# Patient Record
Sex: FEMALE | Race: WHITE | NOT HISPANIC OR LATINO
[De-identification: names, ages, dates, MRNs, and addresses within clinical notes are randomized per-mention and may not be internally consistent; named-entity substitution may affect disease eponyms.]

---

## 2017-04-26 ENCOUNTER — APPOINTMENT (OUTPATIENT)
Dept: ULTRASOUND IMAGING | Facility: CLINIC | Age: 62
End: 2017-04-26

## 2017-04-30 ENCOUNTER — EMERGENCY (EMERGENCY)
Facility: HOSPITAL | Age: 62
LOS: 1 days | Discharge: HOME | End: 2017-04-30
Attending: EMERGENCY MEDICINE | Admitting: EMERGENCY MEDICINE
Payer: COMMERCIAL

## 2017-04-30 VITALS
SYSTOLIC BLOOD PRESSURE: 119 MMHG | TEMPERATURE: 98 F | HEART RATE: 98 BPM | WEIGHT: 169.09 LBS | DIASTOLIC BLOOD PRESSURE: 75 MMHG | HEIGHT: 64 IN | RESPIRATION RATE: 18 BRPM | OXYGEN SATURATION: 95 %

## 2017-04-30 PROCEDURE — 99284 EMERGENCY DEPT VISIT MOD MDM: CPT

## 2017-04-30 PROCEDURE — 99283 EMERGENCY DEPT VISIT LOW MDM: CPT | Mod: 25

## 2017-04-30 RX ORDER — ACETAMINOPHEN WITH CODEINE 300MG-30MG
1 TABLET ORAL
Qty: 8 | Refills: 0 | OUTPATIENT
Start: 2017-04-30 | End: 2017-05-04

## 2017-04-30 RX ORDER — ACETAMINOPHEN WITH CODEINE 300MG-30MG
1 TABLET ORAL ONCE
Qty: 0 | Refills: 0 | Status: DISCONTINUED | OUTPATIENT
Start: 2017-04-30 | End: 2017-04-30

## 2017-04-30 RX ADMIN — Medication 1 TABLET(S): at 13:40

## 2017-04-30 NOTE — ED PROVIDER NOTE - PLAN OF CARE
You were seen in the ER for gluteal and leg pain. You must follow up with your primary physician in 24 to 48 hours. Return to the ER for any new or worsening signs/symptoms. You must follow up with spine clinic in 24 to 48 hours. The number for the [facility] is [phone].   1) Take tylenol/codeine as prescribed.

## 2017-04-30 NOTE — ED PROVIDER NOTE - MEDICAL DECISION MAKING DETAILS
Patient with gluteal pain radiating to L leg, most likely sciatica, doubt vascular insufficiency given H/P. Will do pain managmenet, reassess pain, likely dispo to home with spine clinic follow up. Patient with gluteal pain radiating to L leg, most likely sciatica, doubt vascular insufficiency given H/P. Will do pain managmenet, reassess pain, likely dispo to home with spine clinic follow up.       Attending note-acute on chronic lower back pain with sciatica. Soma, Lodine and Tylenol with codeine.

## 2017-04-30 NOTE — ED PROVIDER NOTE - PHYSICAL EXAMINATION
GENERAL: Awake, alert in NAD // HEENT: MMM // HEAD: Symmetric and atraumatic // EYES: EOMI // CARDIO: Skin warm and well perfused // PULM: normal respiratory effort // NEURO: Motor, sensory grossly intact // SKIN: Warm, dry, good turgur // PSYCH: Mood and affect appropriate. // MSK: T and L spine tenderness with paraspinal muscle tenderness in b/l T and L spine. R and L gluteal pain. Able to ambulate with assistance. Motor and sensory intact in distal L and R foot, PT brisk b/l as well. TTP of L thigh and shin and foot with good ROM b/l. GENERAL: Awake, alert in NAD // HEENT: MMM // HEAD: Symmetric and atraumatic // EYES: EOMI // CARDIO: Skin warm and well perfused // PULM: normal respiratory effort // NEURO: Motor, sensory grossly intact // SKIN: Warm, dry, good turgur // PSYCH: Mood and affect appropriate. // MSK: T and L spine tenderness with paraspinal muscle tenderness in b/l T and L spine. R and L gluteal pain. Able to ambulate with assistance. Motor and sensory intact in distal L and R foot, PT brisk b/l as well. TTP of L thigh and shin and foot with good ROM b/l.       Attending note. The patient is alert and in moderate to severe pain. Patient has tenderness in the left paralumbar pain sciatic notch. Sensation is normal. There is no leg edema. There is no clonus. Patient is able to ambulate. There is no CVA tenderness. There is no midline tenderness.

## 2017-04-30 NOTE — ED PROVIDER NOTE - NS ED ROS FT
No fever or chills, "a little blurry vision" x1 week, no trouble swallowing or speaking, no chest pain, no cough or SOB, no abdominal pain, no nausea or no vomiting,  no rashes, no headache,  otherwise as HPI or negative

## 2017-04-30 NOTE — ED PROVIDER NOTE - OBJECTIVE STATEMENT
61 F with h/o L knee meniscal tear and ACL tear 1 year ago, fibromyalgia, spinal stenosis, here for L buttock pain that radiates to anterior thigh and down to foot. Onset 1 week ago, worse than baseline back pain, getting worse, unable to sleep last night or walk or move, constant, severe. Takes Carisoprodol 175 mg q8 hours as needed and Etodolac 400 mg BID that did not help and Mobic15 mg but not better. Not better with Alleve, advil or excedrin either. No F/C, no chronic steroids, no nuumbness in LE, no back surgeries, no IVDU, no weakness in LE, no obvious inciting trauma or movement. 61 F with h/o L knee meniscal tear and ACL tear 1 year ago, fibromyalgia, spinal stenosis, here for L buttock pain that radiates to anterior thigh and down to foot. Onset 1 week ago, worse than baseline back pain, getting worse, unable to sleep last night or walk or move, constant, severe. Takes Carisoprodol 175 mg q8 hours as needed and Etodolac 400 mg BID that did not help and Mobic15 mg but not better. Not better with Alleve, advil or excedrin either. No F/C, no chronic steroids, no nuumbness in LE, no back surgeries, no IVDU, no weakness in LE, no obvious inciting trauma or movement.       Attending note. Patient was seen in fast track room #5. Agree with above. Patient complaining of acute on chronic left low back pain which radiates down to the lateral thigh and left knee. Patient denies any bowel or bladder dysfunction. Any saddle anesthesia or fever. Patient took Percocet without relief and began itch.  Patient has a prescription for soma and Lodine.  Patient states her spine physician referred her to pain management.

## 2017-04-30 NOTE — ED ADULT NURSE NOTE - OBJECTIVE STATEMENT
pt c/o buttock pain that radiates down leg. pain worse pt unable to sleep. pt denies numbness or bowel or bladder incontinence.

## 2017-04-30 NOTE — ED PROVIDER NOTE - CARE PLAN
Principal Discharge DX:	Sciatica of left side  Instructions for follow-up, activity and diet:	You were seen in the ER for gluteal and leg pain. You must follow up with your primary physician in 24 to 48 hours. Return to the ER for any new or worsening signs/symptoms. You must follow up with spine clinic in 24 to 48 hours. The number for the [facility] is [phone].   1) Take tylenol/codeine as prescribed. Principal Discharge DX:	Sciatica of left side  Instructions for follow-up, activity and diet:	You were seen in the ER for gluteal and leg pain. You must follow up with your primary physician in 24 to 48 hours. Return to the ER for any new or worsening signs/symptoms. You must follow up with spine clinic in 24 to 48 hours. The number for the [facility] is [phone].   1) Take tylenol/codeine as prescribed. Principal Discharge DX:	Sciatica of left side  Instructions for follow-up, activity and diet:	You were seen in the ER for gluteal and leg pain. You must follow up with your primary physician in 24 to 48 hours. Return to the ER for any new or worsening signs/symptoms. You must follow up with spine clinic in 24 to 48 hours. The number for the [facility] is [phone].   1) Take tylenol/codeine as prescribed. Principal Discharge DX:	Sciatica of left side  Instructions for follow-up, activity and diet:	You were seen in the ER for gluteal and leg pain. You must follow up with your primary physician in 24 to 48 hours. Return to the ER for any new or worsening signs/symptoms. You must follow up with spine clinic in 24 to 48 hours. The number for the [facility] is [phone].   1) Take tylenol/codeine as prescribed.

## 2017-05-04 DIAGNOSIS — M53.3 SACROCOCCYGEAL DISORDERS, NOT ELSEWHERE CLASSIFIED: ICD-10-CM

## 2017-05-08 ENCOUNTER — APPOINTMENT (OUTPATIENT)
Dept: ORTHOPEDIC SURGERY | Facility: CLINIC | Age: 62
End: 2017-05-08

## 2017-05-08 VITALS
WEIGHT: 169 LBS | HEIGHT: 64 IN | DIASTOLIC BLOOD PRESSURE: 80 MMHG | SYSTOLIC BLOOD PRESSURE: 129 MMHG | BODY MASS INDEX: 28.85 KG/M2 | HEART RATE: 114 BPM

## 2017-05-16 ENCOUNTER — FORM ENCOUNTER (OUTPATIENT)
Age: 62
End: 2017-05-16

## 2017-05-17 ENCOUNTER — OUTPATIENT (OUTPATIENT)
Dept: OUTPATIENT SERVICES | Facility: HOSPITAL | Age: 62
LOS: 1 days | End: 2017-05-17
Payer: COMMERCIAL

## 2017-05-17 ENCOUNTER — APPOINTMENT (OUTPATIENT)
Dept: MRI IMAGING | Facility: IMAGING CENTER | Age: 62
End: 2017-05-17

## 2017-05-17 DIAGNOSIS — Z00.00 ENCOUNTER FOR GENERAL ADULT MEDICAL EXAMINATION WITHOUT ABNORMAL FINDINGS: ICD-10-CM

## 2017-05-17 PROCEDURE — 72148 MRI LUMBAR SPINE W/O DYE: CPT

## 2017-05-19 ENCOUNTER — CHART COPY (OUTPATIENT)
Age: 62
End: 2017-05-19

## 2017-05-31 ENCOUNTER — APPOINTMENT (OUTPATIENT)
Dept: ORTHOPEDIC SURGERY | Facility: CLINIC | Age: 62
End: 2017-05-31

## 2017-05-31 DIAGNOSIS — M51.26 OTHER INTERVERTEBRAL DISC DISPLACEMENT, LUMBAR REGION: ICD-10-CM

## 2018-04-06 ENCOUNTER — APPOINTMENT (OUTPATIENT)
Dept: MRI IMAGING | Facility: CLINIC | Age: 63
End: 2018-04-06

## 2018-04-09 ENCOUNTER — APPOINTMENT (OUTPATIENT)
Dept: MRI IMAGING | Facility: IMAGING CENTER | Age: 63
End: 2018-04-09
Payer: COMMERCIAL

## 2018-04-09 ENCOUNTER — OUTPATIENT (OUTPATIENT)
Dept: OUTPATIENT SERVICES | Facility: HOSPITAL | Age: 63
LOS: 1 days | End: 2018-04-09
Payer: COMMERCIAL

## 2018-04-09 ENCOUNTER — INPATIENT (INPATIENT)
Facility: HOSPITAL | Age: 63
LOS: 10 days | Discharge: HOME | DRG: 515 | End: 2018-04-20
Attending: HOSPITALIST | Admitting: HOSPITALIST
Payer: COMMERCIAL

## 2018-04-09 VITALS
DIASTOLIC BLOOD PRESSURE: 77 MMHG | HEART RATE: 100 BPM | TEMPERATURE: 98 F | OXYGEN SATURATION: 100 % | RESPIRATION RATE: 18 BRPM | SYSTOLIC BLOOD PRESSURE: 140 MMHG

## 2018-04-09 DIAGNOSIS — Z00.8 ENCOUNTER FOR OTHER GENERAL EXAMINATION: ICD-10-CM

## 2018-04-09 PROCEDURE — 72148 MRI LUMBAR SPINE W/O DYE: CPT | Mod: 26

## 2018-04-09 PROCEDURE — 99285 EMERGENCY DEPT VISIT HI MDM: CPT

## 2018-04-09 PROCEDURE — 72148 MRI LUMBAR SPINE W/O DYE: CPT

## 2018-04-09 NOTE — ED PROVIDER NOTE - PHYSICAL EXAMINATION
Tung: A & O x 3, NAD, HEENT WNL and no facial asymmetry; lungs CTAB, heart with reg rhythm; abdomen soft NTND; extremities with no edema; left paraspinal pain in lumbar area, paraspinal pain in cervical area, normal rectal tone with Tech Anika, skin with no rashes, neuro exam with no sensory deficits in legs, no atrophy in legs, intact strength in legs. position of comfort with hip flexion consistent with spinal stenosis

## 2018-04-09 NOTE — ED ADULT TRIAGE NOTE - CHIEF COMPLAINT QUOTE
"I need an emergent MRI" was sent by [facility] but could not tolerate MRI due to pain

## 2018-04-09 NOTE — ED PROVIDER NOTE - PROGRESS NOTE DETAILS
Tung PGY3: patient will be discharged. She agrees that she will get MRI outpatient Tung PGY3: discharge cancelled Tung PGY3: discharge cancelled. patient can not walk, patient emotionally labile and crying on the floor. she is not thinking clearly and currently doesn't have capacity. We will admit to medicine. call psychiatry. she is on 1:1 and will get haldol. Tung PGY3: telepsych consulted Tung PGY3: discharge cancelled. patient can not walk, patient emotionally labile and crying on the floor. she is not thinking clearly and currently doesn't have capacity stating that she both needs admission for mri and pain control and also needs to leave. We will admit to medicine. call psychiatry. she is on 1:1 and will get haldol. Tung PGY3: telepsych consulted and they do not consult on inpatient medicine admits. I talked with medicine and they will consult inpatient psych. Tung PGY3: discharge cancelled. patient can not walk, patient emotionally labile and crying on the floor. she is not thinking clearly and currently doesn't have capacity stating that she both needs admission for mri and pain control and also needs to leave. We will admit to medicine. call psychiatry. she is on 1:1 and will get haldol.    Dr. Alfaro:  Patient evaluation and care begun prior to documentation. Patient was evaluated by me upon arrival at ED, and found speaking in complete sentences. Patient found with complaints of low back pain, having been told by neurologist to come to ED to undergo MRI under sedation, as patient could not complete MRI as outpatient. Physical exam revealed no gross neurologic deficits, with patient preferring position of flexion. Pain medication given at ED, with patient's symptoms adequately controlled initially. CT's ordered to evaluate lumbar spine, with results describing spinal stenosis. Patient described agreeing with discharge for outpatient follow-up with neurologist for MRI, but upon assessment of ambulation, patient found with difficulty with ambulation, and was considered unfit for discharge, requiring admission for pain control and rehabilitation. Patient agreed with admission but subsequently became agitated at ED, began yelling at ED staff, alternating descriptions of wanting to stay and wanting leave hospital. Haldol ordered for chemical restraints. Patient admitted to Medicine, with hospitalist describing Psychiatry would be consulted as inpatient. I agree with resident assessment and plan. -Dr. Benito Alfaro

## 2018-04-09 NOTE — ED PROVIDER NOTE - MEDICAL DECISION MAKING DETAILS
Tung PGY3: on arrival, patient requsting mri. in light of recent falls will trial narcotic and lay supine for CT and eval for fxr. if narcotic analgesia works, will likely discharge with a few pain medications to help her tolerate out pt mri. Currently not meeting criteria to be concerned about cord compression or cauda equina. no midline pain, no fevers, no urinary incontinence, normal rectal exam, no spinal injections. Tung PGY3: on arrival, patient requsting mri. in light of recent falls will trial narcotic and lay supine for CT and eval for fxr. if narcotic analgesia works, will likely discharge with a few pain medications to help her tolerate out pt mri. Currently not meeting criteria to be concerned about cord compression or cauda equina. no midline pain, no fevers, no urinary incontinence, normal rectal exam, no spinal injections.    Dr. Alfaro: Female patient with past hx of fibromyalgia, spinal stenosis, brought to ED due to complaints of low back pain. Patient found speaking in complete sentences. Patient found with complaints of low back pain, having been told by neurologist to come to ED to undergo MRI under sedation, as patient could not complete MRI as outpatient. No complaints of weakness, numbness, urinary retention/incontinence, constipation or bowel incontinence. Physical exam revealed no gross neurologic deficits, with patient preferring position of flexion. Pain medication given at ED, with patient's symptoms adequately controlled initially. CT's ordered to evaluate lumbar spine, with results describing spinal stenosis. Patient described agreeing with discharge for outpatient follow-up with neurologist for MRI, but upon assessment of ambulation, patient found with difficulty with ambulation, and was considered unfit for discharge, requiring admission for pain control and rehabilitation. Patient agreed with admission but subsequently became agitated at ED, began yelling at ED staff, alternating descriptions of wanting to stay and wanting leave hospital. Haldol ordered for chemical restraints. Patient admitted to Medicine, with hospitalist describing Psychiatry would be consulted as inpatient.

## 2018-04-09 NOTE — ED PROVIDER NOTE - CARE PLAN
Assessment and plan of treatment:	Follow up with your primary care doctor within 48-72 hours.   You must return for new, worsening or concerning symptoms; specifically including those listed on the attached sheet.   You may take dilaudid 2mg to help when you lie down for your MRI. You took a similar dose in the ER and tolerated the CT scan well.   Follow up for your outpatient MRI Principal Discharge DX:	Intractable back pain  Assessment and plan of treatment:	Follow up with your primary care doctor within 48-72 hours.   You must return for new, worsening or concerning symptoms; specifically including those listed on the attached sheet.   You may take dilaudid 2mg to help when you lie down for your MRI. You took a similar dose in the ER and tolerated the CT scan well.   Follow up for your outpatient MRI  Secondary Diagnosis:	Emotional lability

## 2018-04-09 NOTE — ED ADULT NURSE NOTE - OBJECTIVE STATEMENT
62 y.o F presents to the ED from a [facility] for an emergent MRI. Patient is awake, alert and speaking coherently. As per patient she was scheduled to have an MRI of the cervical and lumbar spine today but could not tolerate the procedure because of pain. Patient states "the imaging center told me to come to the hospital for an emergent MRI and sedation."

## 2018-04-09 NOTE — ED PROVIDER NOTE - PLAN OF CARE
Follow up with your primary care doctor within 48-72 hours.   You must return for new, worsening or concerning symptoms; specifically including those listed on the attached sheet.   You may take dilaudid 2mg to help when you lie down for your MRI. You took a similar dose in the ER and tolerated the CT scan well.   Follow up for your outpatient MRI

## 2018-04-09 NOTE — ED PROVIDER NOTE - OBJECTIVE STATEMENT
62 year old, presenting with 2 falls in the past few months from the ice. Requesting an MRI that has been ordered by Neurology to evaluate worsening of her cervical and lumbar pathology (disc bulge) since the falls. Went to get MRI today and she couldn't lie still from too much pain and was told to the hospital to be  "sedated" for mri. compaining of lumbar spine pain worse than cervical. patient also complaining knee pain bilaterally, ankle and shoulder all chronic. no fevers, no episodes of incontinence over the past few months (did one time not make it to the bathroom in time). Patient received paraspinal injection with Dr. Hampton in office 3 weeks ago. No history of iv drug use.     pain is refractory to Meloxicam       Neurology: Pop Jorge  PMD: pavan Oliveros

## 2018-04-10 DIAGNOSIS — M79.7 FIBROMYALGIA: ICD-10-CM

## 2018-04-10 DIAGNOSIS — M54.9 DORSALGIA, UNSPECIFIED: ICD-10-CM

## 2018-04-10 DIAGNOSIS — Z29.9 ENCOUNTER FOR PROPHYLACTIC MEASURES, UNSPECIFIED: ICD-10-CM

## 2018-04-10 DIAGNOSIS — R45.86 EMOTIONAL LABILITY: ICD-10-CM

## 2018-04-10 DIAGNOSIS — F43.24 ADJUSTMENT DISORDER WITH DISTURBANCE OF CONDUCT: ICD-10-CM

## 2018-04-10 LAB
ALBUMIN SERPL ELPH-MCNC: 4.3 G/DL — SIGNIFICANT CHANGE UP (ref 3.3–5)
ALP SERPL-CCNC: 112 U/L — SIGNIFICANT CHANGE UP (ref 40–120)
ALT FLD-CCNC: 14 U/L RC — SIGNIFICANT CHANGE UP (ref 10–45)
ANION GAP SERPL CALC-SCNC: 15 MMOL/L — SIGNIFICANT CHANGE UP (ref 5–17)
APPEARANCE UR: ABNORMAL
AST SERPL-CCNC: 11 U/L — SIGNIFICANT CHANGE UP (ref 10–40)
BASOPHILS # BLD AUTO: 0.1 K/UL — SIGNIFICANT CHANGE UP (ref 0–0.2)
BASOPHILS NFR BLD AUTO: 0.9 % — SIGNIFICANT CHANGE UP (ref 0–2)
BILIRUB SERPL-MCNC: 0.4 MG/DL — SIGNIFICANT CHANGE UP (ref 0.2–1.2)
BILIRUB UR-MCNC: ABNORMAL
BUN SERPL-MCNC: 42 MG/DL — HIGH (ref 7–23)
CALCIUM SERPL-MCNC: 10.4 MG/DL — SIGNIFICANT CHANGE UP (ref 8.4–10.5)
CHLORIDE SERPL-SCNC: 94 MMOL/L — LOW (ref 96–108)
CO2 SERPL-SCNC: 30 MMOL/L — SIGNIFICANT CHANGE UP (ref 22–31)
COLOR SPEC: SIGNIFICANT CHANGE UP
CREAT SERPL-MCNC: 0.96 MG/DL — SIGNIFICANT CHANGE UP (ref 0.5–1.3)
CRP SERPL-MCNC: 2.9 MG/DL — HIGH (ref 0–0.4)
DIFF PNL FLD: NEGATIVE — SIGNIFICANT CHANGE UP
EOSINOPHIL # BLD AUTO: 0.5 K/UL — SIGNIFICANT CHANGE UP (ref 0–0.5)
EOSINOPHIL NFR BLD AUTO: 3 % — SIGNIFICANT CHANGE UP (ref 0–6)
EPI CELLS # UR: SIGNIFICANT CHANGE UP /HPF
ERYTHROCYTE [SEDIMENTATION RATE] IN BLOOD: 85 MM/HR — HIGH (ref 0–20)
FOLATE SERPL-MCNC: >20 NG/ML — SIGNIFICANT CHANGE UP (ref 4.8–24.2)
GLUCOSE SERPL-MCNC: 175 MG/DL — HIGH (ref 70–99)
GLUCOSE UR QL: NEGATIVE — SIGNIFICANT CHANGE UP
HCT VFR BLD CALC: 34.1 % — LOW (ref 34.5–45)
HGB BLD-MCNC: 11.7 G/DL — SIGNIFICANT CHANGE UP (ref 11.5–15.5)
HYALINE CASTS # UR AUTO: ABNORMAL
KETONES UR-MCNC: NEGATIVE — SIGNIFICANT CHANGE UP
LEUKOCYTE ESTERASE UR-ACNC: NEGATIVE — SIGNIFICANT CHANGE UP
LYMPHOCYTES # BLD AUTO: 22.7 % — SIGNIFICANT CHANGE UP (ref 13–44)
LYMPHOCYTES # BLD AUTO: 3.4 K/UL — HIGH (ref 1–3.3)
MCHC RBC-ENTMCNC: 32.4 PG — SIGNIFICANT CHANGE UP (ref 27–34)
MCHC RBC-ENTMCNC: 34.3 GM/DL — SIGNIFICANT CHANGE UP (ref 32–36)
MCV RBC AUTO: 94.4 FL — SIGNIFICANT CHANGE UP (ref 80–100)
MONOCYTES # BLD AUTO: 1.1 K/UL — HIGH (ref 0–0.9)
MONOCYTES NFR BLD AUTO: 7.1 % — SIGNIFICANT CHANGE UP (ref 2–14)
NEUTROPHILS # BLD AUTO: 10 K/UL — HIGH (ref 1.8–7.4)
NEUTROPHILS NFR BLD AUTO: 66.3 % — SIGNIFICANT CHANGE UP (ref 43–77)
NITRITE UR-MCNC: NEGATIVE — SIGNIFICANT CHANGE UP
PH UR: 6 — SIGNIFICANT CHANGE UP (ref 5–8)
PLATELET # BLD AUTO: 536 K/UL — HIGH (ref 150–400)
POTASSIUM SERPL-MCNC: 3.3 MMOL/L — LOW (ref 3.5–5.3)
POTASSIUM SERPL-SCNC: 3.3 MMOL/L — LOW (ref 3.5–5.3)
PROT SERPL-MCNC: 8.5 G/DL — HIGH (ref 6–8.3)
PROT UR-MCNC: NEGATIVE — SIGNIFICANT CHANGE UP
RBC # BLD: 3.61 M/UL — LOW (ref 3.8–5.2)
RBC # FLD: 11.7 % — SIGNIFICANT CHANGE UP (ref 10.3–14.5)
RBC CASTS # UR COMP ASSIST: SIGNIFICANT CHANGE UP /HPF (ref 0–2)
SODIUM SERPL-SCNC: 139 MMOL/L — SIGNIFICANT CHANGE UP (ref 135–145)
SP GR SPEC: 1.01 — SIGNIFICANT CHANGE UP (ref 1.01–1.02)
UROBILINOGEN FLD QL: NEGATIVE — SIGNIFICANT CHANGE UP
VIT B12 SERPL-MCNC: 870 PG/ML — SIGNIFICANT CHANGE UP (ref 232–1245)
WBC # BLD: 15 K/UL — HIGH (ref 3.8–10.5)
WBC # FLD AUTO: 15 K/UL — HIGH (ref 3.8–10.5)
WBC UR QL: SIGNIFICANT CHANGE UP /HPF (ref 0–5)

## 2018-04-10 PROCEDURE — 72131 CT LUMBAR SPINE W/O DYE: CPT | Mod: 26

## 2018-04-10 PROCEDURE — 71045 X-RAY EXAM CHEST 1 VIEW: CPT | Mod: 26

## 2018-04-10 PROCEDURE — 99223 1ST HOSP IP/OBS HIGH 75: CPT | Mod: GC

## 2018-04-10 PROCEDURE — 72125 CT NECK SPINE W/O DYE: CPT | Mod: 26

## 2018-04-10 RX ORDER — OXYCODONE HYDROCHLORIDE 5 MG/1
5 TABLET ORAL EVERY 6 HOURS
Qty: 0 | Refills: 0 | Status: DISCONTINUED | OUTPATIENT
Start: 2018-04-10 | End: 2018-04-17

## 2018-04-10 RX ORDER — ACETAMINOPHEN 500 MG
650 TABLET ORAL EVERY 6 HOURS
Qty: 0 | Refills: 0 | Status: DISCONTINUED | OUTPATIENT
Start: 2018-04-10 | End: 2018-04-11

## 2018-04-10 RX ORDER — HEPARIN SODIUM 5000 [USP'U]/ML
5000 INJECTION INTRAVENOUS; SUBCUTANEOUS EVERY 8 HOURS
Qty: 0 | Refills: 0 | Status: DISCONTINUED | OUTPATIENT
Start: 2018-04-10 | End: 2018-04-15

## 2018-04-10 RX ORDER — ACETAMINOPHEN 500 MG
975 TABLET ORAL ONCE
Qty: 0 | Refills: 0 | Status: COMPLETED | OUTPATIENT
Start: 2018-04-10 | End: 2018-04-10

## 2018-04-10 RX ORDER — KETOROLAC TROMETHAMINE 30 MG/ML
15 SYRINGE (ML) INJECTION ONCE
Qty: 0 | Refills: 0 | Status: DISCONTINUED | OUTPATIENT
Start: 2018-04-10 | End: 2018-04-10

## 2018-04-10 RX ORDER — POTASSIUM CHLORIDE 20 MEQ
10 PACKET (EA) ORAL ONCE
Qty: 0 | Refills: 0 | Status: COMPLETED | OUTPATIENT
Start: 2018-04-10 | End: 2018-04-10

## 2018-04-10 RX ORDER — POTASSIUM CHLORIDE 20 MEQ
20 PACKET (EA) ORAL ONCE
Qty: 0 | Refills: 0 | Status: DISCONTINUED | OUTPATIENT
Start: 2018-04-10 | End: 2018-04-10

## 2018-04-10 RX ORDER — HYDROMORPHONE HYDROCHLORIDE 2 MG/ML
1 INJECTION INTRAMUSCULAR; INTRAVENOUS; SUBCUTANEOUS
Qty: 4 | Refills: 0 | OUTPATIENT
Start: 2018-04-10 | End: 2018-04-10

## 2018-04-10 RX ORDER — KETOROLAC TROMETHAMINE 30 MG/ML
30 SYRINGE (ML) INJECTION ONCE
Qty: 0 | Refills: 0 | Status: DISCONTINUED | OUTPATIENT
Start: 2018-04-10 | End: 2018-04-10

## 2018-04-10 RX ORDER — OXYCODONE HYDROCHLORIDE 5 MG/1
5 TABLET ORAL ONCE
Qty: 0 | Refills: 0 | Status: DISCONTINUED | OUTPATIENT
Start: 2018-04-10 | End: 2018-04-10

## 2018-04-10 RX ORDER — HYDROMORPHONE HYDROCHLORIDE 2 MG/ML
1 INJECTION INTRAMUSCULAR; INTRAVENOUS; SUBCUTANEOUS ONCE
Qty: 0 | Refills: 0 | Status: DISCONTINUED | OUTPATIENT
Start: 2018-04-10 | End: 2018-04-10

## 2018-04-10 RX ORDER — HALOPERIDOL DECANOATE 100 MG/ML
2.5 INJECTION INTRAMUSCULAR ONCE
Qty: 0 | Refills: 0 | Status: COMPLETED | OUTPATIENT
Start: 2018-04-10 | End: 2018-04-10

## 2018-04-10 RX ORDER — INFLUENZA VIRUS VACCINE 15; 15; 15; 15 UG/.5ML; UG/.5ML; UG/.5ML; UG/.5ML
0.5 SUSPENSION INTRAMUSCULAR ONCE
Qty: 0 | Refills: 0 | Status: DISCONTINUED | OUTPATIENT
Start: 2018-04-10 | End: 2018-04-20

## 2018-04-10 RX ADMIN — HYDROMORPHONE HYDROCHLORIDE 1 MILLIGRAM(S): 2 INJECTION INTRAMUSCULAR; INTRAVENOUS; SUBCUTANEOUS at 01:25

## 2018-04-10 RX ADMIN — HEPARIN SODIUM 5000 UNIT(S): 5000 INJECTION INTRAVENOUS; SUBCUTANEOUS at 15:52

## 2018-04-10 RX ADMIN — Medication 100 MILLIEQUIVALENT(S): at 16:21

## 2018-04-10 RX ADMIN — Medication 650 MILLIGRAM(S): at 13:27

## 2018-04-10 RX ADMIN — Medication 650 MILLIGRAM(S): at 14:30

## 2018-04-10 RX ADMIN — Medication 100 MILLIEQUIVALENT(S): at 14:52

## 2018-04-10 RX ADMIN — Medication 975 MILLIGRAM(S): at 00:40

## 2018-04-10 RX ADMIN — Medication 15 MILLIGRAM(S): at 04:50

## 2018-04-10 RX ADMIN — OXYCODONE HYDROCHLORIDE 5 MILLIGRAM(S): 5 TABLET ORAL at 18:07

## 2018-04-10 RX ADMIN — OXYCODONE HYDROCHLORIDE 5 MILLIGRAM(S): 5 TABLET ORAL at 00:40

## 2018-04-10 RX ADMIN — Medication 30 MILLIGRAM(S): at 13:27

## 2018-04-10 RX ADMIN — HYDROMORPHONE HYDROCHLORIDE 1 MILLIGRAM(S): 2 INJECTION INTRAMUSCULAR; INTRAVENOUS; SUBCUTANEOUS at 01:24

## 2018-04-10 RX ADMIN — HEPARIN SODIUM 5000 UNIT(S): 5000 INJECTION INTRAVENOUS; SUBCUTANEOUS at 21:23

## 2018-04-10 RX ADMIN — Medication 15 MILLIGRAM(S): at 06:00

## 2018-04-10 RX ADMIN — OXYCODONE HYDROCHLORIDE 5 MILLIGRAM(S): 5 TABLET ORAL at 04:37

## 2018-04-10 RX ADMIN — HALOPERIDOL DECANOATE 2.5 MILLIGRAM(S): 100 INJECTION INTRAMUSCULAR at 06:33

## 2018-04-10 RX ADMIN — Medication 30 MILLIGRAM(S): at 15:50

## 2018-04-10 RX ADMIN — Medication 975 MILLIGRAM(S): at 01:25

## 2018-04-10 NOTE — PHYSICAL THERAPY INITIAL EVALUATION ADULT - PERTINENT HX OF CURRENT PROBLEM, REHAB EVAL
63 y/o F PMHx significant for lumbar degenerative disc disease and fibromyalgia p/w chronic lumbago as well as progressively worsening LE weakness and now with complaints of inability to walk. 63 y/o F PMHx significant for lumbar degenerative disc disease and fibromyalgia p/w chronic lumbago as well as progressively worsening LE weakness and now with complaints of inability to walk. CT Cspine/Lspine (4/10): (-) fx, multilevel degenerative changes

## 2018-04-10 NOTE — H&P ADULT - NSHPPHYSICALEXAM_GEN_ALL_CORE
GENERAL: Laying in bed, sleeping, poorly arousable  HEENT: Head NC/AT  NECK: No deformity  CHEST/LUNG: CTABL, no wheezes  CARDIOVASCULAR: RRR, no murmur/rubs/gallops. No LE edema. 2+ pulses 4/4 extremities  ABDOMEN: Soft, +BS  EXTREMITIES: No deformities  NEURO: Unable to obtain due to patient's mentation  PSYCH: Lethargic, sleeping. GENERAL: Laying in bed, confused, occasionally raising her voice at examiners.  HEENT: Head NC/AT  NECK: No deformity  CHEST/LUNG: CTABL, no wheezes  CARDIOVASCULAR: RRR, no murmur/rubs/gallops. No LE edema. 2+ pulses 4/4 extremities  ABDOMEN: Soft, +BS  BACK: Point tenderness overlying lumbar spinous processes.   EXTREMITIES: No deformities  NEURO:  4/5 strength B/L UE. 5/5 strength B/L plantar flexion. 3/5 strength B/L hip flexors. Normal rectal tone per ED provider's exam.  PSYCH: Lethargic, occasionally irritable

## 2018-04-10 NOTE — ED BEHAVIORAL HEALTH ASSESSMENT NOTE - OTHER
Patient restlessly moving around in bed secondary to pain. Patient is tearful, irritated, and answering some questions Patient laying in bed during interview Tearful during interview Deaths in family

## 2018-04-10 NOTE — H&P ADULT - PROBLEM SELECTOR PLAN 1
- Case d/w patient's neurologist, Dr. Vidal  - Will collect ESR/CRP  - Ordered MR lumbar spine w/ w/o aggie with sedation by anesthesia  - Pain control - would start with NSAIDs and Tylenol for now

## 2018-04-10 NOTE — H&P ADULT - NSHPLABSRESULTS_GEN_ALL_CORE
Comprehensive Metabolic Panel (04.10.18 @ 00:32)    Sodium, Serum: 139 mmol/L    Potassium, Serum: 3.3 mmol/L    Chloride, Serum: 94 mmol/L    Carbon Dioxide, Serum: 30 mmol/L    Anion Gap, Serum: 15 mmol/L    Blood Urea Nitrogen, Serum: 42 mg/dL    Creatinine, Serum: 0.96 mg/dL    Glucose, Serum: 175 mg/dL    Calcium, Total Serum: 10.4 mg/dL    Protein Total, Serum: 8.5 g/dL    Albumin, Serum: 4.3 g/dL    Bilirubin Total, Serum: 0.4 mg/dL    Alkaline Phosphatase, Serum: 112 U/L    Aspartate Aminotransferase (AST/SGOT): 11 U/L    Alanine Aminotransferase (ALT/SGPT): 14 U/L RC    eGFR if Non : 64: Interpretative comment    Complete Blood Count + Automated Diff (04.10.18 @ 00:32)    WBC Count: 15.0 K/uL    RBC Count: 3.61 M/uL    Hemoglobin: 11.7 g/dL    Hematocrit: 34.1 %    Mean Cell Volume: 94.4 fl    Mean Cell Hemoglobin: 32.4 pg    Mean Cell Hemoglobin Conc: 34.3 gm/dL    Red Cell Distrib Width: 11.7 %    Platelet Count - Automated: 536 K/uL    Auto Neutrophil #: 10.0 K/uL    Auto Lymphocyte #: 3.4 K/uL    Auto Monocyte #: 1.1 K/uL    Auto Eosinophil #: 0.5 K/uL    Auto Basophil #: 0.1 K/uL    Auto Neutrophil %: 66.3: Differential percentages must be correlated with absolute numbers for  clinical significance. %    Auto Lymphocyte %: 22.7 %    Auto Monocyte %: 7.1 %    Auto Eosinophil %: 3.0 %    Auto Basophil %: 0.9 %    < from: CT Lumbar Spine No Cont (04.10.18 @ 00:55) >    IMPRESSION:      1. No lumbar spine fracture or traumatic spondylolisthesis.  2. Multilevel degenerative changes of the lumbar spine worst at L4-L5   resulting in moderate spinal canal stenosis and moderate left and mild   right neural foraminal stenosis and at L5-S1 resulting in moderate   bilateral neural foraminal stenosis.    < end of copied text >

## 2018-04-10 NOTE — H&P ADULT - HISTORY OF PRESENT ILLNESS
Patient seen and examined at the bedside. She was recently medicated with antipsychotics in the ED and was lethargic, refusing to speak with examiners. HPI below is based on ED provider's history.    "61 y/o F presenting with 2 falls in the past few months from the ice. Requesting an MRI that has been ordered by Neurology to evaluate worsening of her cervical and lumbar pathology (disc bulge) since the falls. Went to get MRI today and she couldn't lie still from too much pain and was told to the hospital to be "sedated" for MRI. complaining of lumbar spine pain worse than cervical. Patient also complaining knee pain bilaterally, ankle and shoulder all chronic. no fevers, no episodes of incontinence over the past few months (did one time not make it to the bathroom in time). Patient received paraspinal injection with Dr. Baumann in office 3 weeks ago. No history of IV drug use."     In the ED, patient was slated to be discharged but later became emotionally disturbed, violent, and was crying. She was put on 1:1 and given Haldol. ED team contacted telepsychiatry but they stated they do not see inpatient admits. ED was advised to admit to medicine team for inpatient psych consult.     CT showed:   1. No lumbar spine fracture or traumatic spondylolisthesis.  2. Multilevel degenerative changes of the lumbar spine worst at L4-L5   resulting in moderate spinal canal stenosis and moderate left and mild   right neural foraminal stenosis and at L5-S1 resulting in moderate   bilateral neural foraminal stenosis. Patient seen and examined at the bedside. She was recently medicated with antipsychotics in the ED and was lethargic, refusing to speak with examiners. HPI below is based on ED provider's history.    "61 y/o F presenting with 2 falls in the past few months from the ice. Requesting an MRI that has been ordered by Neurology to evaluate worsening of her cervical and lumbar pathology (disc bulge) since the falls. Went to get MRI today and she couldn't lie still from too much pain and was told to the hospital to be "sedated" for MRI. Complaining of lumbar spine pain worse than cervical. Patient also complaining knee pain bilaterally, ankle and shoulder all chronic. No fevers, no episodes of incontinence over the past few months (did one time not make it to the bathroom in time). Patient received paraspinal injection with Dr. Baumann in office 3 weeks ago. No history of IV drug use."     In the ED, patient was slated to be discharged but later became emotionally disturbed, violent, and was crying. She was put on 1:1 and given Haldol. ED team contacted telepsychiatry but they stated they do not see inpatient admits. ED was advised to admit to medicine team for inpatient psych consult. Patient's vitals were WNL other than for borderline tachycardia. Labs generally unremarkable other than for leukocytosis of 15 and K of 3.3. CT scan was ordered in the ED to r/o fx. No vertebral fractures were noted and was otherwise remarkable for:   1. No lumbar spine fracture or traumatic spondylolisthesis.  2. Multilevel degenerative changes of the lumbar spine worst at L4-L5   resulting in moderate spinal canal stenosis and moderate left and mild   right neural foraminal stenosis and at L5-S1 resulting in moderate   bilateral neural foraminal stenosis.      Patient was later seen by medicine team, at which point she was awake but appeared confused. She said the "chair" she was sitting on (while on the hospital stretcher) was causing her pain. She says she "cannot walk" and says "I am in pain." She also said she wanted to leave the ED.

## 2018-04-10 NOTE — H&P ADULT - PROBLEM SELECTOR PLAN 4
#DVT PPx: HSQ  #Diet: NPO pending MRI w/ sedation  #PT consult called    Aime Muñoz MD  PGY-1 | Preliminary Resident  Department of Internal Medicine  [phone] ([facility]) | [phone] ([place])

## 2018-04-10 NOTE — ED BEHAVIORAL HEALTH ASSESSMENT NOTE - HPI (INCLUDE ILLNESS QUALITY, SEVERITY, DURATION, TIMING, CONTEXT, MODIFYING FACTORS, ASSOCIATED SIGNS AND SYMPTOMS)
63 y/o female, domiciled at home with , unemployed and unable to get disability, no known psychiatric hx, no known hx of substance abuse, with a PMHx of fibromyalgia and spinal stenosis with a herniated cervical disc currently being managed by neurology, who presented to the ED for an MRI under sedation after being unable to get MRI as outpatient secondary to pain. Psychiatry consulted because the patient became agitated and emotional upon learning that she was going to be discharged.     On exam, the patient is hunched over and restless in bed. The patient is alert, agitated and tearful, fully oriented, and responding to questions appropriately. The patient reports that she originally came to the hospital for an MRI of her back under sedation because she was unable to get one as an outpatient. In response to being asked why she became upset in the emergency department, the patient responded with "How would you like being held against your will?" She claims that she either wants to be admitted to the hospital as an inpatient to get an MRI or wants to go home. She notes that she is experiencing agonizing pain in her back that is radiating to her bilateral legs and she needs more pain medication. The patient denies any hx of anxiety or depression, but does report that her mother passed away in June 2017 and her daughter was murdered in August 2017. When asked if she has seen a psychiatrist in regards to these events, she claims that "no one could understand what it feels like to lose a child." The patient denies any SI/HI, visual/auditory hallucinations, and paranoia. No manic sx.

## 2018-04-10 NOTE — PATIENT PROFILE ADULT. - NSSUBSTANCEUSE_GEN_ALL_CORE_SD
DR BHUPINDER Daniel will make the corrections to the hospital meds to reflect home med rec unable to get information

## 2018-04-10 NOTE — H&P ADULT - PROBLEM SELECTOR PLAN 3
#DVT PPx: HSQ  #Diet: NPO pending MRI w/ sedation - Patient with obvious mood disturbance today but does not have any psychiatric history documented  - Psych c/s called, f/u recs - Patient with obvious mood disturbance today but does not have any psychiatric history documented. Agitation present in ED.   - Psych c/s called, f/u recs  -1 to 1 observation at this time  -haldol prn, monitor QTc  -check UA; monitor for signs/symptoms of infection

## 2018-04-10 NOTE — ED BEHAVIORAL HEALTH ASSESSMENT NOTE - SUMMARY
63 y/o female, domiciled at home with , unemployed and unable to get disability, no known psychiatric hx, no known hx of substance abuse, with a PMHx of fibromyalgia and spinal stenosis with a herniated cervical disc currently being managed by neurology, who presented to the ED for an MRI under sedation after being unable to get MRI as outpatient secondary to pain. Psychiatry consulted because the patient became agitated and emotional upon learning that she was going to be discharged. The patient was evaluated by psychiatry in the ED. Patient was tearful, agitated, and complaining of severe pain, but did not endorse any depressive/psychotic symptoms or SI. Seems likely that her earlier presentation was secondary to low frustration tolerance/anger outburst - however this is not grounds for inpt psych admission. Do not feel that the patient requires 1:1. Recommend giving the patient Haldol 1mg IV q6h PRN for agitation.

## 2018-04-10 NOTE — PHYSICAL THERAPY INITIAL EVALUATION ADULT - DISCHARGE DISPOSITION, PT EVAL
TBD pending completion of PT functional eval [facility] but pt declines- wants home with home PT- will need assist with all functional mobility/rehabilitation facility

## 2018-04-10 NOTE — ED BEHAVIORAL HEALTH ASSESSMENT NOTE - DESCRIPTION
mother passed away in June 2017 and her daughter was murdered in August 2017 CT of lumbar and cervical spine completed in ED. Patient was slated to be discharged with instructions to get an outpatient MRI, but later became emotionally disturbed, violent, and was crying. She was put on 1:1 and given Haldol. ED team contacted telepsychiatry but they stated they do not see inpatient admits. ED was advised to admit to medicine team for inpatient psych consult. Fibromyalgia, spinal stenosis, herniated cervical disc

## 2018-04-10 NOTE — ED ADULT NURSE REASSESSMENT NOTE - NS ED NURSE REASSESS COMMENT FT1
pt on phone, laughing and talking.
pt requesting someone wheel her outside so she can smoke a cigarette
1:1 constant observation initiated for patient safety ; Patient agitated; patient is a fall risk and is attempting to get out of stretcher; patient is refusing staff assistance and is verbally abusive towards staff. ED tech at bedside. Patient re-educated on safety and fall risk precautions. Red non slip socks on patient. Will continue to monitor and patient safety maintained.
Patient agitated and was sitting on the floor (did not fall). Patient educated again on safety and escorted onto chair. Patient states "she wants to go home". Dr Alfaro and Dr Hale aware. Friend (Sherrell) at the bedside. Will continue to monitor and patient safety maintained.
Patient sleeping comfortably. Family at the bedside. Will continue to monitor and patient safety maintained.

## 2018-04-10 NOTE — H&P ADULT - NSHPREVIEWOFSYSTEMS_GEN_ALL_CORE
ROS unobtainable due to Full ROS could not be obtained due to patient's AMS but was significant for generalized pain and difficulty walking, as described above.

## 2018-04-10 NOTE — PHYSICAL THERAPY INITIAL EVALUATION ADULT - GENERAL OBSERVATIONS, REHAB EVAL
Pt received semi-supine on stretcher, asleep, NAD. Pt difficult to rouse from sleep, received haldol earlier in day for agitation. Pt received semi-supine on stretcher, asleep, NAD. Pt difficult to rouse from sleep, received haldol earlier in day for agitation. 3/12- pt alert, sitting at EOB with 1:1 present

## 2018-04-10 NOTE — H&P ADULT - PROBLEM SELECTOR PLAN 2
- Patient does not have any documented fibromyalgia meds on admission  - Continue to treat lower back pain for now

## 2018-04-11 LAB
CULTURE RESULTS: SIGNIFICANT CHANGE UP
HCT VFR BLD CALC: 31.8 % — LOW (ref 34.5–45)
HGB BLD-MCNC: 11.2 G/DL — LOW (ref 11.5–15.5)
MAGNESIUM SERPL-MCNC: 1.8 MG/DL — SIGNIFICANT CHANGE UP (ref 1.6–2.6)
MCHC RBC-ENTMCNC: 33.2 PG — SIGNIFICANT CHANGE UP (ref 27–34)
MCHC RBC-ENTMCNC: 35.2 GM/DL — SIGNIFICANT CHANGE UP (ref 32–36)
MCV RBC AUTO: 94.3 FL — SIGNIFICANT CHANGE UP (ref 80–100)
PHOSPHATE SERPL-MCNC: 3 MG/DL — SIGNIFICANT CHANGE UP (ref 2.5–4.5)
PLATELET # BLD AUTO: 437 K/UL — HIGH (ref 150–400)
RBC # BLD: 3.37 M/UL — LOW (ref 3.8–5.2)
RBC # FLD: 11.9 % — SIGNIFICANT CHANGE UP (ref 10.3–14.5)
SPECIMEN SOURCE: SIGNIFICANT CHANGE UP
T PALLIDUM AB TITR SER: NEGATIVE — SIGNIFICANT CHANGE UP
WBC # BLD: 11 K/UL — HIGH (ref 3.8–10.5)
WBC # FLD AUTO: 11 K/UL — HIGH (ref 3.8–10.5)

## 2018-04-11 PROCEDURE — 72156 MRI NECK SPINE W/O & W/DYE: CPT | Mod: 26

## 2018-04-11 PROCEDURE — 99233 SBSQ HOSP IP/OBS HIGH 50: CPT | Mod: GC

## 2018-04-11 PROCEDURE — 70553 MRI BRAIN STEM W/O & W/DYE: CPT | Mod: 26

## 2018-04-11 PROCEDURE — 72157 MRI CHEST SPINE W/O & W/DYE: CPT | Mod: 26

## 2018-04-11 PROCEDURE — 72158 MRI LUMBAR SPINE W/O & W/DYE: CPT | Mod: 26

## 2018-04-11 RX ORDER — KETOROLAC TROMETHAMINE 30 MG/ML
15 SYRINGE (ML) INJECTION EVERY 6 HOURS
Qty: 0 | Refills: 0 | Status: DISCONTINUED | OUTPATIENT
Start: 2018-04-11 | End: 2018-04-15

## 2018-04-11 RX ORDER — HALOPERIDOL DECANOATE 100 MG/ML
1 INJECTION INTRAMUSCULAR ONCE
Qty: 0 | Refills: 0 | Status: COMPLETED | OUTPATIENT
Start: 2018-04-11 | End: 2018-04-11

## 2018-04-11 RX ORDER — HALOPERIDOL DECANOATE 100 MG/ML
1 INJECTION INTRAMUSCULAR EVERY 6 HOURS
Qty: 0 | Refills: 0 | Status: DISCONTINUED | OUTPATIENT
Start: 2018-04-11 | End: 2018-04-17

## 2018-04-11 RX ORDER — ACETAMINOPHEN 500 MG
650 TABLET ORAL EVERY 6 HOURS
Qty: 0 | Refills: 0 | Status: DISCONTINUED | OUTPATIENT
Start: 2018-04-11 | End: 2018-04-17

## 2018-04-11 RX ADMIN — HALOPERIDOL DECANOATE 1 MILLIGRAM(S): 100 INJECTION INTRAMUSCULAR at 01:14

## 2018-04-11 RX ADMIN — Medication 15 MILLIGRAM(S): at 15:10

## 2018-04-11 RX ADMIN — OXYCODONE HYDROCHLORIDE 5 MILLIGRAM(S): 5 TABLET ORAL at 02:35

## 2018-04-11 RX ADMIN — HEPARIN SODIUM 5000 UNIT(S): 5000 INJECTION INTRAVENOUS; SUBCUTANEOUS at 23:30

## 2018-04-11 RX ADMIN — HEPARIN SODIUM 5000 UNIT(S): 5000 INJECTION INTRAVENOUS; SUBCUTANEOUS at 06:23

## 2018-04-11 RX ADMIN — OXYCODONE HYDROCHLORIDE 5 MILLIGRAM(S): 5 TABLET ORAL at 01:49

## 2018-04-11 RX ADMIN — Medication 15 MILLIGRAM(S): at 14:36

## 2018-04-11 RX ADMIN — OXYCODONE HYDROCHLORIDE 5 MILLIGRAM(S): 5 TABLET ORAL at 09:21

## 2018-04-11 RX ADMIN — OXYCODONE HYDROCHLORIDE 5 MILLIGRAM(S): 5 TABLET ORAL at 10:20

## 2018-04-11 RX ADMIN — HEPARIN SODIUM 5000 UNIT(S): 5000 INJECTION INTRAVENOUS; SUBCUTANEOUS at 14:38

## 2018-04-11 NOTE — PROGRESS NOTE ADULT - ATTENDING COMMENTS
pt with some confusion and agitation of unclear etiology  on 1:1 observation, psych consult appreciated  mildly elevated esr, crp-- infectious workup negative (UA, Blood cx)  Will check TSH, RPR. B12 within normal limits.   Check MR head in addition to spine.   Appreciate neuro recs

## 2018-04-11 NOTE — PROGRESS NOTE ADULT - PROBLEM SELECTOR PLAN 3
- Patient with obvious mood disturbance today but does not have any psychiatric history documented. Agitation present in ED.   - Psych c/s called, f/u recs  -1 to 1 observation at this time  -Haldol 1mg q6H prn  -UA shows no evidence of infection; f/u UCx  -F/u BCx - Patient with obvious mood disturbance today but does not have any psychiatric history documented. Agitation present in ED.   - Psych c/s called, appreciate recs  -1 to 1 observation at this time  -Haldol 1mg q6H prn  -UA shows no evidence of infection; f/u UCx  -F/u BCx- ngtd  -check tsh, rpr

## 2018-04-11 NOTE — PROGRESS NOTE ADULT - PROBLEM SELECTOR PLAN 1
- Case d/w patient's neurologist, Dr. Vidal  - Elevated ESR and CRP noted; F/u BCx, UCx  - Ordered MR lumbar spine w/ w/o aggie and MR cervical/thoracic spine w/o aggie, to be done with sedation by anesthesia  - Pain control - would start with NSAIDs and Tylenol for tolerable pain, oxycodone for severe pain

## 2018-04-11 NOTE — CONSULT NOTE ADULT - PROBLEM SELECTOR RECOMMENDATION 9
Given elevated WBC, ESR and CRP, recommend MRI of T-L spine with and w/o contrast to r/o Osteomyelitis/discitis.  Blood culture.  patient is currently afebrile.  Recommend ID eval.  ALso recommend MRi of C spine. (patient needs sedation for MRI)  Pain control.

## 2018-04-11 NOTE — PROGRESS NOTE ADULT - ASSESSMENT
63 y/o F PMHx significant for lumbar degenerative disc disease and fibromyalgia p/w chronic lumbago as well as progressively worsening LE weakness and now with complaints of inability to walk. Case d/w patient's private neurologist, who had only seen patient once. He states her symptoms may have been exacerbated by a fall several weeks ago. Per our discussion, patient was having weakness during her office visit 2-3 weeks ago. DDx of patient's weakness likely 2/2 pain due to spondylosis/degenerative disc disease/ spinal stenosis >>> a more serious issue such as cord compression or cauda equina but neurological exam without concerning findings at this time.
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
## 2018-04-11 NOTE — PROVIDER CONTACT NOTE (OTHER) - SITUATION
pt extremely agitated, restless and emotional upon return to 3 rogers. Pt screaming and flopping around in bed.

## 2018-04-11 NOTE — CONSULT NOTE ADULT - PROBLEM SELECTOR RECOMMENDATION 2
AMS, slightly improved today.  Not sure of patient's baseline status. ? medication effects.  Psych f/u.  Continue clinical observation, if no improvement, would also recommend MRI of brain.
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
## 2018-04-12 ENCOUNTER — RESULT REVIEW (OUTPATIENT)
Age: 63
End: 2018-04-12

## 2018-04-12 LAB
ALBUMIN SERPL ELPH-MCNC: 4 G/DL — SIGNIFICANT CHANGE UP (ref 3.3–5)
ALP SERPL-CCNC: 96 U/L — SIGNIFICANT CHANGE UP (ref 40–120)
ALT FLD-CCNC: 11 U/L RC — SIGNIFICANT CHANGE UP (ref 10–45)
ANION GAP SERPL CALC-SCNC: 15 MMOL/L — SIGNIFICANT CHANGE UP (ref 5–17)
APPEARANCE CSF: CLEAR — SIGNIFICANT CHANGE UP
APPEARANCE SPUN FLD: ABNORMAL
AST SERPL-CCNC: 11 U/L — SIGNIFICANT CHANGE UP (ref 10–40)
BASOPHILS # BLD AUTO: 0.01 K/UL — SIGNIFICANT CHANGE UP (ref 0–0.2)
BASOPHILS NFR BLD AUTO: 0.1 % — SIGNIFICANT CHANGE UP (ref 0–2)
BILIRUB SERPL-MCNC: 0.3 MG/DL — SIGNIFICANT CHANGE UP (ref 0.2–1.2)
BUN SERPL-MCNC: 21 MG/DL — SIGNIFICANT CHANGE UP (ref 7–23)
CALCIUM SERPL-MCNC: 9.7 MG/DL — SIGNIFICANT CHANGE UP (ref 8.4–10.5)
CHLORIDE SERPL-SCNC: 100 MMOL/L — SIGNIFICANT CHANGE UP (ref 96–108)
CO2 SERPL-SCNC: 27 MMOL/L — SIGNIFICANT CHANGE UP (ref 22–31)
COLOR CSF: ABNORMAL
CREAT SERPL-MCNC: 0.7 MG/DL — SIGNIFICANT CHANGE UP (ref 0.5–1.3)
CRYPTOC AG CSF-ACNC: NEGATIVE — SIGNIFICANT CHANGE UP
CSF PCR RESULT: SIGNIFICANT CHANGE UP
EOSINOPHIL # BLD AUTO: 0 K/UL — SIGNIFICANT CHANGE UP (ref 0–0.5)
EOSINOPHIL NFR BLD AUTO: 0 % — SIGNIFICANT CHANGE UP (ref 0–6)
GLUCOSE CSF-MCNC: <6 MG/DL — LOW (ref 40–70)
GLUCOSE SERPL-MCNC: 161 MG/DL — HIGH (ref 70–99)
GRAM STN FLD: SIGNIFICANT CHANGE UP
HCT VFR BLD CALC: 34.5 % — SIGNIFICANT CHANGE UP (ref 34.5–45)
HGB BLD-MCNC: 11.1 G/DL — LOW (ref 11.5–15.5)
IMM GRANULOCYTES NFR BLD AUTO: 0.3 % — SIGNIFICANT CHANGE UP (ref 0–1.5)
LYMPHOCYTES # BLD AUTO: 1.81 K/UL — SIGNIFICANT CHANGE UP (ref 1–3.3)
LYMPHOCYTES # BLD AUTO: 10.4 % — LOW (ref 13–44)
LYMPHOCYTES # CSF: 60 % — SIGNIFICANT CHANGE UP (ref 40–80)
MAGNESIUM SERPL-MCNC: 1.8 MG/DL — SIGNIFICANT CHANGE UP (ref 1.6–2.6)
MCHC RBC-ENTMCNC: 31.3 PG — SIGNIFICANT CHANGE UP (ref 27–34)
MCHC RBC-ENTMCNC: 32.2 GM/DL — SIGNIFICANT CHANGE UP (ref 32–36)
MCV RBC AUTO: 97.2 FL — SIGNIFICANT CHANGE UP (ref 80–100)
MONOCYTES # BLD AUTO: 0.73 K/UL — SIGNIFICANT CHANGE UP (ref 0–0.9)
MONOCYTES NFR BLD AUTO: 4.2 % — SIGNIFICANT CHANGE UP (ref 2–14)
MONOS+MACROS NFR CSF: 5 % — LOW (ref 15–45)
NEUTROPHILS # BLD AUTO: 14.79 K/UL — HIGH (ref 1.8–7.4)
NEUTROPHILS # CSF: 35 % — HIGH (ref 0–6)
NEUTROPHILS NFR BLD AUTO: 85 % — HIGH (ref 43–77)
NRBC NFR CSF: 187 /UL — HIGH (ref 0–5)
PHOSPHATE SERPL-MCNC: 2.8 MG/DL — SIGNIFICANT CHANGE UP (ref 2.5–4.5)
PLATELET # BLD AUTO: 532 K/UL — HIGH (ref 150–400)
POTASSIUM SERPL-MCNC: 3.8 MMOL/L — SIGNIFICANT CHANGE UP (ref 3.5–5.3)
POTASSIUM SERPL-SCNC: 3.8 MMOL/L — SIGNIFICANT CHANGE UP (ref 3.5–5.3)
PROT CSF-MCNC: >525 MG/DL — SIGNIFICANT CHANGE UP (ref 15–45)
PROT SERPL-MCNC: 7.9 G/DL — SIGNIFICANT CHANGE UP (ref 6–8.3)
RBC # BLD: 3.55 M/UL — LOW (ref 3.8–5.2)
RBC # CSF: 2292 /UL — HIGH (ref 0–0)
RBC # FLD: 13.3 % — SIGNIFICANT CHANGE UP (ref 10.3–14.5)
SODIUM SERPL-SCNC: 142 MMOL/L — SIGNIFICANT CHANGE UP (ref 135–145)
SPECIMEN SOURCE: SIGNIFICANT CHANGE UP
TSH SERPL-MCNC: 1.54 UIU/ML — SIGNIFICANT CHANGE UP (ref 0.27–4.2)
TUBE TYPE: SIGNIFICANT CHANGE UP
WBC # BLD: 17.39 K/UL — HIGH (ref 3.8–10.5)
WBC # FLD AUTO: 17.39 K/UL — HIGH (ref 3.8–10.5)

## 2018-04-12 PROCEDURE — 88189 FLOWCYTOMETRY/READ 16 & >: CPT

## 2018-04-12 PROCEDURE — 62270 DX LMBR SPI PNXR: CPT

## 2018-04-12 PROCEDURE — 99254 IP/OBS CNSLTJ NEW/EST MOD 60: CPT | Mod: GC

## 2018-04-12 PROCEDURE — 93306 TTE W/DOPPLER COMPLETE: CPT | Mod: 26

## 2018-04-12 PROCEDURE — 77003 FLUOROGUIDE FOR SPINE INJECT: CPT | Mod: 26

## 2018-04-12 PROCEDURE — 99233 SBSQ HOSP IP/OBS HIGH 50: CPT | Mod: GC

## 2018-04-12 RX ORDER — CEFEPIME 1 G/1
INJECTION, POWDER, FOR SOLUTION INTRAMUSCULAR; INTRAVENOUS
Qty: 0 | Refills: 0 | Status: DISCONTINUED | OUTPATIENT
Start: 2018-04-12 | End: 2018-04-12

## 2018-04-12 RX ORDER — VANCOMYCIN HCL 1 G
1000 VIAL (EA) INTRAVENOUS EVERY 12 HOURS
Qty: 0 | Refills: 0 | Status: DISCONTINUED | OUTPATIENT
Start: 2018-04-12 | End: 2018-04-12

## 2018-04-12 RX ORDER — CEFEPIME 1 G/1
2000 INJECTION, POWDER, FOR SOLUTION INTRAMUSCULAR; INTRAVENOUS ONCE
Qty: 0 | Refills: 0 | Status: COMPLETED | OUTPATIENT
Start: 2018-04-12 | End: 2018-04-12

## 2018-04-12 RX ORDER — MEROPENEM 1 G/30ML
2000 INJECTION INTRAVENOUS EVERY 8 HOURS
Qty: 0 | Refills: 0 | Status: DISCONTINUED | OUTPATIENT
Start: 2018-04-12 | End: 2018-04-14

## 2018-04-12 RX ORDER — PIPERACILLIN AND TAZOBACTAM 4; .5 G/20ML; G/20ML
3.38 INJECTION, POWDER, LYOPHILIZED, FOR SOLUTION INTRAVENOUS EVERY 8 HOURS
Qty: 0 | Refills: 0 | Status: DISCONTINUED | OUTPATIENT
Start: 2018-04-12 | End: 2018-04-12

## 2018-04-12 RX ORDER — PIPERACILLIN AND TAZOBACTAM 4; .5 G/20ML; G/20ML
3.38 INJECTION, POWDER, LYOPHILIZED, FOR SOLUTION INTRAVENOUS ONCE
Qty: 0 | Refills: 0 | Status: DISCONTINUED | OUTPATIENT
Start: 2018-04-12 | End: 2018-04-12

## 2018-04-12 RX ORDER — VANCOMYCIN HCL 1 G
1000 VIAL (EA) INTRAVENOUS EVERY 12 HOURS
Qty: 0 | Refills: 0 | Status: DISCONTINUED | OUTPATIENT
Start: 2018-04-12 | End: 2018-04-14

## 2018-04-12 RX ORDER — CEFEPIME 1 G/1
2000 INJECTION, POWDER, FOR SOLUTION INTRAMUSCULAR; INTRAVENOUS EVERY 8 HOURS
Qty: 0 | Refills: 0 | Status: DISCONTINUED | OUTPATIENT
Start: 2018-04-12 | End: 2018-04-12

## 2018-04-12 RX ADMIN — Medication 250 MILLIGRAM(S): at 17:57

## 2018-04-12 RX ADMIN — HEPARIN SODIUM 5000 UNIT(S): 5000 INJECTION INTRAVENOUS; SUBCUTANEOUS at 06:15

## 2018-04-12 RX ADMIN — OXYCODONE HYDROCHLORIDE 5 MILLIGRAM(S): 5 TABLET ORAL at 08:54

## 2018-04-12 RX ADMIN — CEFEPIME 100 MILLIGRAM(S): 1 INJECTION, POWDER, FOR SOLUTION INTRAMUSCULAR; INTRAVENOUS at 10:35

## 2018-04-12 RX ADMIN — OXYCODONE HYDROCHLORIDE 5 MILLIGRAM(S): 5 TABLET ORAL at 09:30

## 2018-04-12 RX ADMIN — MEROPENEM 200 MILLIGRAM(S): 1 INJECTION INTRAVENOUS at 23:02

## 2018-04-12 RX ADMIN — HEPARIN SODIUM 5000 UNIT(S): 5000 INJECTION INTRAVENOUS; SUBCUTANEOUS at 23:02

## 2018-04-12 RX ADMIN — HEPARIN SODIUM 5000 UNIT(S): 5000 INJECTION INTRAVENOUS; SUBCUTANEOUS at 13:02

## 2018-04-12 NOTE — PROGRESS NOTE ADULT - ATTENDING COMMENTS
MR lumbar spine findings as above-- concerning for infection vs malignancy. Likely a phlegmon. LP today with neurorads and pending results start antibiotics. WBC and tachycardia consistent with SIRs, unclear at this time if this is infection/sepsis.   No surgical intervention at this time per neurosurgery. pt may require IR drainage vs open/biopsy for diagnosis  MR head with non specific right periventricular white matter lucency-will discuss with neur. pt with some confusion and agitation of unclear etiology;; improved today  on 1:1 observation, psych consult appreciated  appreciate recs of neuro, ID, neuroradiology.

## 2018-04-12 NOTE — PROGRESS NOTE ADULT - SUBJECTIVE AND OBJECTIVE BOX
Examined at bedside.    PHYSICAL EXAMINATION:   Vital Signs Last 24 Hrs  T(C): 36.8 (12 Apr 2018 01:38), Max: 37.6 (11 Apr 2018 16:03)  T(F): 98.2 (12 Apr 2018 01:38), Max: 99.6 (11 Apr 2018 16:03)  HR: 114 (12 Apr 2018 01:38) (102 - 117)  BP: 120/74 (12 Apr 2018 01:38) (120/74 - 176/89)  BP(mean): 110 (11 Apr 2018 22:00) (99 - 112)  RR: 19 (12 Apr 2018 01:38) (16 - 20)  SpO2: 96% (12 Apr 2018 01:38) (94% - 97%)    Uncooperative exam  AOx2 (missed place), requires encouragement to follow commands  CN: PERRL, EOMI, no facial droop  Motor: moves spontanously lowers, kicks out at examiner strong  Sensation intact to light touch  Reflexes: no clonus

## 2018-04-12 NOTE — PROGRESS NOTE ADULT - PROBLEM SELECTOR PLAN 1
- MR lumbar spine suspicious for phlegmon w archnoiditis vs malignancy   - NSG consulted, recs appreciated: plan for IR guided drainage of collection  - Elevated ESR and CRP noted; will continue to trend  - BCx NGTD  - F/u UCx  - F/u full reads of MR neural axis  - Pain control - c/w NSAIDs and Tylenol for tolerable pain, oxycodone for severe pain - MR lumbar spine suspicious for phlegmon w/ arachnoiditis vs malignancy   - NSG consulted, recs appreciated: no surgery advised  - Neurorads advise LP under fluoroscopy with CSF studies - ordered, f/u results  - Elevated ESR and CRP noted; will continue to trend  - BCx NGTD  - F/u UCx  - Pain control - c/w NSAIDs and Tylenol for tolerable pain, oxycodone for severe pain

## 2018-04-12 NOTE — PROGRESS NOTE ADULT - ASSESSMENT
62F with history of spinal stenosis s/p paraspinal injections p/w worsening back pain and elevated serum markers concerning for infection. MRI images reviewed which suggest lumbar spine epidural infection with slight paraspinal involvement.     -No acute neurosurgical intervention indicated at this time  -Recommend IR aspiration or CT-guided aspiration of lesion for culture, gram stain  -ID consult  -neurochecks q4h  -Follow up blood cultures  -Continue to trend ESR/CRP

## 2018-04-12 NOTE — CONSULT NOTE ADULT - ASSESSMENT
62F with history of spinal stenosis s/p paraspinal injections p/w worsening back pain and elevated serum markers concerning for infection. MRI images reviewed which suggest lumbar spine epidural infection with slight paraspinal involvement.     -No acute neurosurgical intervention indicated at this time  -Recommend IR aspiration or CT-guided aspiration of lesion for culture, gram stain  -May continue to hold Abx at this time as patient does not appear septic  -neurochecks q4h  -Follow up blood cultures  -Continue to trend ESR/CRP  -Will continue to follow

## 2018-04-12 NOTE — PROGRESS NOTE ADULT - ASSESSMENT
66yo F with hx multiple falls, p/w increasing LBP and inablility to ambulate. MRI LS performed yesterday demonstrates ? mass of the lower lumbar region, differential includes lymphoma, inflammatory process such as sarcoid/Lyme, as well as infection.  1. Recommend LP under flouroscopy as per neuroradiology recommendation  2. Nsx input noted, recommend close fu  3. Ct c/a/p to r/o malignancy  4. Recommend ID eval  5. rheumatological lopez  6. pain control  7. PT  8. plan d/w medicine resident

## 2018-04-12 NOTE — PROGRESS NOTE ADULT - SUBJECTIVE AND OBJECTIVE BOX
Patient is a 62y old  Female who presents with a chief complaint of CC: "I need an emergent MRI" (10 Apr 2018 10:20)    HPI:  pt seen and examined, reports some improvement of back pain, no new weakness/numbness    PAST MEDICAL & SURGICAL HISTORY:  Herniated disc, cervical  Spinal stenosis  Fibromyalgia  No significant past surgical history    FAMILY HISTORY:  No pertinent family history in first degree relatives    Social Hx:  Nonsmoker, no drug or alcohol use  MEDICATIONS  (STANDING):  heparin  Injectable 5000 Unit(s) SubCutaneous every 8 hours  influenza   Vaccine 0.5 milliLiter(s) IntraMuscular once  piperacillin/tazobactam IVPB. 3.375 Gram(s) IV Intermittent once  piperacillin/tazobactam IVPB. 3.375 Gram(s) IV Intermittent every 8 hours    MEDICATIONS  (PRN):  acetaminophen   Tablet. 650 milliGRAM(s) Oral every 6 hours PRN Mild Pain (1 - 3)  haloperidol    Injectable 1 milliGRAM(s) IV Push every 6 hours PRN agitation  ketorolac   Injectable 15 milliGRAM(s) IV Push every 6 hours PRN Moderate Pain (4 - 6)  oxyCODONE    IR 5 milliGRAM(s) Oral every 6 hours PRN Severe Pain (7 - 10)    Allergies    No Known Allergies    Intolerances      ROS: denies any fever/chills.    Vital Signs Last 24 Hrs  T(C): 36.8 (2018 01:38), Max: 37.6 (2018 16:03)  T(F): 98.2 (2018 01:38), Max: 99.6 (2018 16:03)  HR: 114 (2018 01:38) (102 - 117)  BP: 120/74 (2018 01:38) (120/74 - 176/89)  BP(mean): 110 (2018 22:00) (99 - 112)  RR: 19 (2018 01:38) (16 - 20)  SpO2: 96% (2018 01:38) (94% - 97%)  GENERAL EXAM:  Constitutional: awake and alert. NAD    NEUROLOGICAL EXAM:  MS: AAOX3, fluent, attends b/l; recent and remote memory intact; normal attention, language and fund of knowledge.   CN: VFF, EOMI, PERRL, no TEE, no APD,  V1-3 intact, no facial asymmetry, t/p midline, SCM/trap intact.  Motor: Strength: 5/5 UE, LE 4/5 distal, 5-/5 prox, pain limitation. Tone: normal. Bulk: normal. DTR dimished Sensation:dec LT/PP LE Coordination: intact UE   Gait: unable to stand    Labs:                         11.1   17.39 )-----------( 532      ( 2018 07:47 )             34.5     04-12    142  |  100  |  21  ----------------------------<  161<H>  3.8   |  27  |  0.70    Ca    9.7      2018 06:55  Phos  2.8     04-12  Mg     1.8     04-12    TPro  7.9  /  Alb  4.0  /  TBili  0.3  /  DBili  x   /  AST  11  /  ALT  11  /  AlkPhos  96  04-12              CAPILLARY BLOOD GLUCOSE        LIVER FUNCTIONS - ( 2018 06:55 )  Alb: 4.0 g/dL / Pro: 7.9 g/dL / ALK PHOS: 96 U/L / ALT: 11 U/L RC / AST: 11 U/L / GGT: x           Urinalysis Basic - ( 10 Apr 2018 16:44 )    Color: PL Yellow / Appearance: SL Turbid / S.014 / pH: x  Gluc: x / Ketone: Negative  / Bili: Small / Urobili: Negative   Blood: x / Protein: Negative / Nitrite: Negative   Leuk Esterase: Negative / RBC: 0-2 /HPF / WBC 0-2 /HPF   Sq Epi: x / Non Sq Epi: Few /HPF / Bacteria: x      Radiology:  -CT Head:  -MRI brain  -MRA brain/Carotids  -EEG

## 2018-04-12 NOTE — CONSULT NOTE ADULT - ASSESSMENT
63 y/o F PM lumbar radiculopathy, DJD, fibromyalgia, presenting with worsening back pain and gat instability  Found on MRI to have  Had leukocytosis to 15 with elevated ESR 85 , CRP2.9  Observe off Abx until biopsy  f/u blood cx-NGTD

## 2018-04-12 NOTE — CONSULT NOTE ADULT - SUBJECTIVE AND OBJECTIVE BOX
Pager: [phone]     HPI: 62F with history of spinal stenosis undergoing conservative management admitted to hospital for MRI with sedation for evaluation of worsening of cervical and lumbar spondylosis after mechanical fall two months ago. MRI Lspine demonstrates possible epidural phlegmon with arachnoiditis. Patient complains of severe back pain but unable to give more detailed history due to agitation and emotional lability which was present on arrival to ER. Of note, patient has been receiving paraspinal injections, last injection 3 weeks ago. Mild leukocytosis, elevated ESR and CRP.    PAST MEDICAL HISTORY   Herniated disc, cervical  Spinal stenosis  Fibromyalgia    PAST SURGICAL HISTORY   No significant past surgical history    No Known Allergies    MEDICATIONS:  Antibiotics:    Neuro:  acetaminophen   Tablet. 650 milliGRAM(s) Oral every 6 hours PRN  haloperidol    Injectable 1 milliGRAM(s) IV Push every 6 hours PRN  ketorolac   Injectable 15 milliGRAM(s) IV Push every 6 hours PRN  oxyCODONE    IR 5 milliGRAM(s) Oral every 6 hours PRN    Anticoagulation:  heparin  Injectable 5000 Unit(s) SubCutaneous every 8 hours    Other:  influenza   Vaccine 0.5 milliLiter(s) IntraMuscular once    PHYSICAL EXAMINATION:   T(C): 36.6 (18 @ 22:40), Max: 37.6 (18 @ 16:03)  HR: 116 (18 @ 22:40) (98 - 117)  BP: 176/89 (18 @ 22:40) (124/66 - 176/89)  RR: 20 (18 @ 22:40) (16 - 20)  SpO2: 94% (18 @ 22:40) (94% - 98%)  Wt(kg): --  Weight (kg): 75.3 ( @ 08:50)    AOx2 (missed place), requires encouragement to follow commands  CN: PERRL, EOMI, no facial droop  Motor: 5/5 BUE, 4/5 BLE (only to noxious stimulation due to non-cooperation)  Sensation intact to light touch  Reflexes: no clonus     LABS:                        11.2   11.0  )-----------( 437      ( 2018 15:10 )             31.8       Phos  3.0       Mg     1.8     -        Urinalysis Basic - ( 10 Apr 2018 16:44 )    Color: PL Yellow / Appearance: SL Turbid / S.014 / pH: x  Gluc: x / Ketone: Negative  / Bili: Small / Urobili: Negative   Blood: x / Protein: Negative / Nitrite: Negative   Leuk Esterase: Negative / RBC: 0-2 /HPF / WBC 0-2 /HPF   Sq Epi: x / Non Sq Epi: Few /HPF / Bacteria: x

## 2018-04-12 NOTE — PROGRESS NOTE ADULT - ASSESSMENT
63 y/o F PMHx significant for lumbar degenerative disc disease and fibromyalgia p/w chronic lumbago as well as progressively worsening LE weakness and now with complaints of inability to walk. Case d/w patient's private neurologist, who had only seen patient once. He states her symptoms may have been exacerbated by a fall several weeks ago. Per our discussion, patient was having weakness during her office visit 2-3 weeks ago. DDx of patient's weakness likely 2/2 pain due to spondylosis/degenerative disc disease/ spinal stenosis >>> a more serious issue such as cord compression or cauda equina but neurological exam without concerning findings at this time. 63 y/o F PMHx significant for lumbar degenerative disc disease and fibromyalgia p/w chronic lumbago as well as progressively worsening LE weakness and now with complaints of inability to walk.  MRI with severe spinal canal stenosis and abnormal enhancement of cauda equina concerning for phlegmon vs complex abscess, faceitis with possibilities of lymphoma, sarcoid, aracnoiditis on differential

## 2018-04-12 NOTE — PROGRESS NOTE ADULT - SUBJECTIVE AND OBJECTIVE BOX
CONTACT INFO:  Aime Muñoz MD  PGY-1 | Internal Medicine  Spectra: [phone]    Patient is a 62y old  Female who presents with a chief complaint of CC: "I need an emergent MRI" (10 Apr 2018 10:20)      SUBJECTIVE / OVERNIGHT EVENTS: Patient s/p MR neural axis w/ sedation. MRI L-spine demonstrated possible epidural phlegmon with arachnoiditis vs. lymphoma. NSG evaluated patient overnight, advised no surgical intervention but recommend CT guided aspiration.  Patient still agitated on return to medical floors last night; 1:1 reinstated.     On tele:   REVIEW OF SYSTEMS:  14-point ROS was conducted with the patient and is negative except for those listed above.      MEDICATIONS  (STANDING):  heparin  Injectable 5000 Unit(s) SubCutaneous every 8 hours  influenza   Vaccine 0.5 milliLiter(s) IntraMuscular once    MEDICATIONS  (PRN):  acetaminophen   Tablet. 650 milliGRAM(s) Oral every 6 hours PRN Mild Pain (1 - 3)  haloperidol    Injectable 1 milliGRAM(s) IV Push every 6 hours PRN agitation  ketorolac   Injectable 15 milliGRAM(s) IV Push every 6 hours PRN Moderate Pain (4 - 6)  oxyCODONE    IR 5 milliGRAM(s) Oral every 6 hours PRN Severe Pain (7 - 10)      T(C): 36.8 (18 @ 01:38), Max: 37.6 (18 @ 16:03)  HR: 114 (18 @ 01:38) (102 - 117)  BP: 120/74 (18 @ 01:38) (120/74 - 176/89)  RR: 19 (18 @ 01:38) (16 - 20)  SpO2: 96% (18 @ 01:38) (94% - 97%)    GENERAL: Laying in bed, confused, occasionally raising her voice at examiners.  HEENT: Head NC/AT  NECK: No deformity  CHEST/LUNG: CTABL, no wheezes  CARDIOVASCULAR: RRR, no murmur/rubs/gallops. No LE edema. 2+ pulses 4/4 extremities  ABDOMEN: Soft, +BS  BACK: Point tenderness overlying lumbar spinous processes.   EXTREMITIES: No deformities  NEURO:  4/5 strength B/L UE. 5/5 strength B/L plantar flexion. 3/5 strength B/L hip flexors. Normal rectal tone per ED provider's exam.  PSYCH: Lethargic, occasionally irritable    LABS:                        11.2   11.0  )-----------( 437      ( 2018 15:10 )             31.8       Phos  3.0     04-11  Mg     1.8     04-11            Urinalysis Basic - ( 10 Apr 2018 16:44 )    Color: PL Yellow / Appearance: SL Turbid / S.014 / pH: x  Gluc: x / Ketone: Negative  / Bili: Small / Urobili: Negative   Blood: x / Protein: Negative / Nitrite: Negative   Leuk Esterase: Negative / RBC: 0-2 /HPF / WBC 0-2 /HPF   Sq Epi: x / Non Sq Epi: Few /HPF / Bacteria: x      I&O's Summary    2018 07:01  -  2018 07:00  --------------------------------------------------------  IN: 0 mL / OUT: 500 mL / NET: -500 mL        MICROBIOLOGY:    RADIOLOGY: CONTACT INFO:  Aime Muñoz MD  PGY-1 | Internal Medicine  Spectra: [phone]    Patient is a 62y old  Female who presents with a chief complaint of CC: "I need an emergent MRI" (10 Apr 2018 10:20)      SUBJECTIVE / OVERNIGHT EVENTS: Patient s/p MR neural axis w/ sedation. MRI L-spine demonstrated possible epidural phlegmon with arachnoiditis vs. lymphoma. NSG evaluated patient overnight, advised no surgical intervention but recommend CT guided aspiration.  Patient still agitated on return to medical floors last night; 1:1 reinstated.     On tele:   REVIEW OF SYSTEMS:  14-point ROS was conducted with the patient and is negative except for those listed above.      MEDICATIONS  (STANDING):  heparin  Injectable 5000 Unit(s) SubCutaneous every 8 hours  influenza   Vaccine 0.5 milliLiter(s) IntraMuscular once    MEDICATIONS  (PRN):  acetaminophen   Tablet. 650 milliGRAM(s) Oral every 6 hours PRN Mild Pain (1 - 3)  haloperidol    Injectable 1 milliGRAM(s) IV Push every 6 hours PRN agitation  ketorolac   Injectable 15 milliGRAM(s) IV Push every 6 hours PRN Moderate Pain (4 - 6)  oxyCODONE    IR 5 milliGRAM(s) Oral every 6 hours PRN Severe Pain (7 - 10)      T(C): 36.8 (18 @ 01:38), Max: 37.6 (18 @ 16:03)  HR: 114 (18 @ 01:38) (102 - 117)  BP: 120/74 (18 @ 01:38) (120/74 - 176/89)  RR: 19 (18 @ 01:38) (16 - 20)  SpO2: 96% (18 @ 01:38) (94% - 97%)    GENERAL: Laying in bed, confused, occasionally raising her voice at examiners.  HEENT: Head NC/AT  NECK: No deformity  CHEST/LUNG: CTABL, no wheezes  CARDIOVASCULAR: RRR, no murmur/rubs/gallops. No LE edema. 2+ pulses 4/4 extremities  ABDOMEN: Soft, +BS  BACK: Point tenderness overlying lumbar spinous processes.   EXTREMITIES: No deformities  NEURO:  4/5 strength B/L UE. 5/5 strength B/L plantar flexion. 3/5 strength B/L hip flexors. Normal rectal tone per ED provider's exam.  PSYCH: Lethargic, occasionally irritable    LABS:                        11.2   11.0  )-----------( 437      ( 2018 15:10 )             31.8       Phos  3.0     04-11  Mg     1.8     04-11            Urinalysis Basic - ( 10 Apr 2018 16:44 )    Color: PL Yellow / Appearance: SL Turbid / S.014 / pH: x  Gluc: x / Ketone: Negative  / Bili: Small / Urobili: Negative   Blood: x / Protein: Negative / Nitrite: Negative   Leuk Esterase: Negative / RBC: 0-2 /HPF / WBC 0-2 /HPF   Sq Epi: x / Non Sq Epi: Few /HPF / Bacteria: x      I&O's Summary    2018 07:01  -  2018 07:00  --------------------------------------------------------  IN: 0 mL / OUT: 500 mL / NET: -500 mL      MR HEAD:  A subcentimeter nonspecific focus of enhancement involves the right   parietal periventricular white matter. Differential considerations   include a small subacute infarct with hemorrhagic transformation, a focus   of infection-cerebritis, a small mass, or atypical demyelination.    Extensive sinusitis.    MR Spine  There is no cord compression along the cervical or thoracic spine. The   spinal cord demonstrates normal course and caliber.    There is abnormal enhancement involving the entire cauda equina extending   to the ventral and dorsal aspects of the lower thoracic cord. There is   abnormal epidural complex possibly multiloculated abscess versus phlegmon   centered at the left L4-L5 facet joint, possibly involving the L5-S1   facet.  Severe spinal canal stenosis with compression of all descending   enhancing nerves at L4-L5 is seen. There is abnormal enhancement of the   intervertebral disc bordering this process at L4-L5 and L5-S1.    The constellation of these findings is most consistent with left L4-L5,   possibly L5-S1 facetitis with resulting epidural phlegmon versus complex   abscess spanning L3-L5 and arachnoiditis involving the cauda equina. The   differential diagnosis of sarcoidosis versus lymphoma are not entirely   excluded; please correlate with patient history, and clinical and   laboratory exam findings.        Case discussed with : neurology, neursosurgery, ID

## 2018-04-12 NOTE — CONSULT NOTE ADULT - SUBJECTIVE AND OBJECTIVE BOX
HPI:  Patient seen and examined at the bedside. She was recently medicated with antipsychotics in the ED and was lethargic, refusing to speak with examiners. HPI below is based on ED provider's history.    "61 y/o F presenting with 2 falls in the past few months from the ice. Requesting an MRI that has been ordered by Neurology to evaluate worsening of her cervical and lumbar pathology (disc bulge) since the falls. Went to get MRI today and she couldn't lie still from too much pain and was told to the hospital to be "sedated" for MRI. Complaining of lumbar spine pain worse than cervical. Patient also complaining knee pain bilaterally, ankle and shoulder all chronic. No fevers, no episodes of incontinence over the past few months (did one time not make it to the bathroom in time). Patient received paraspinal injection with Dr. Baumann in office 3 weeks ago. No history of IV drug use."     In the ED, patient was slated to be discharged but later became emotionally disturbed, violent, and was crying. She was put on 1:1 and given Haldol. ED team contacted telepsychiatry but they stated they do not see inpatient admits. ED was advised to admit to medicine team for inpatient psych consult. Patient's vitals were WNL other than for borderline tachycardia. Labs generally unremarkable other than for leukocytosis of 15 and K of 3.3. CT scan was ordered in the ED to r/o fx. No vertebral fractures were noted and was otherwise remarkable for:   1. No lumbar spine fracture or traumatic spondylolisthesis.  2. Multilevel degenerative changes of the lumbar spine worst at L4-L5   resulting in moderate spinal canal stenosis and moderate left and mild   right neural foraminal stenosis and at L5-S1 resulting in moderate   bilateral neural foraminal stenosis.      Patient was later seen by medicine team, at which point she was awake but appeared confused. She said the "chair" she was sitting on (while on the hospital stretcher) was causing her pain. She says she "cannot walk" and says "I am in pain." She also said she wanted to leave the ED. (10 Apr 2018 10:20)    ID note-above reviewed. Has history of lumbar radiculopathy and DJD. Patient says her current back pain started a few weeks ago, worsening and says it goes from lower back worse on the left radiates down her legs.  Had trigger point injections x 2-3 weeks ago. Denies fever and chills or trauma. Denies cough congestion says she "has some snot in her nose" but no sinus pain, sore throat cough, chest pain, dyspnea diarrhea or urinary symptoms. Says she cannot walk because she has no balance. Hs had weight loss 14 pounds over the past few months but she was actively controlling her diet. Denies night sweats  Only on meloxicam at home, has ha 2 c section and bunionectomy x 2    PAST MEDICAL & SURGICAL HISTORY:  Herniated disc, cervical  Spinal stenosis  Fibromyalgia  No significant past surgical history      Allergies  No Known Allergies        ANTIMICROBIALS:      OTHER MEDS: MEDICATIONS  (STANDING):  acetaminophen   Tablet. 650 every 6 hours PRN  haloperidol    Injectable 1 every 6 hours PRN  heparin  Injectable 5000 every 8 hours  influenza   Vaccine 0.5 once  ketorolac   Injectable 15 every 6 hours PRN  oxyCODONE    IR 5 every 6 hours PRN      SOCIAL HISTORY:  [x ] etoh [ ] tobacco [ ] former smoker [ ] IVDU    FAMILY HISTORY:  Father CAD, HTN  No Fh of malignancies      REVIEW OF SYSTEMS  [  ] ROS unobtainable because:    [  x] All other systems negative except as noted below:	    Constitutional:  [ ] fever [ ] chills  [ ] weight loss  [x ] weakness  Skin:  [ ] rash [ ] phlebitis	  Eyes: [ ] icterus [ ] pain  [ ] discharge	  ENMT: [ ] sore throat  [ ] thrush [ ] ulcers [ ] exudates  Respiratory: [ ] dyspnea [ ] hemoptysis [ ] cough [ ] sputum	  Cardiovascular:  [ ] chest pain [ ] palpitations [ ] edema	  Gastrointestinal:  [ ] nausea [ ] vomiting [ ] diarrhea [ ] constipation [ ] pain	  Genitourinary:  [ ] dysuria [ ] frequency [ ] hematuria [ ] discharge [ ] flank pain  [ ] incontinence  Musculoskeletal:  [ ] myalgias [ ] arthralgias [ ] arthritis  [ x] back pain  Neurological:  [ ] headache [ ] seizures  [ ] confusion/altered mental status  Psychiatric:  [ ] anxiety [ ] depression	  Hematology/Lymphatics:  [ ] lymphadenopathy  Endocrine:  [ ] adrenal [ ] thyroid  Allergic/Immunologic:	 [ ] transplant [ ] seasonal    Vital Signs Last 24 Hrs  T(F): 99.2 (18 @ 10:27), Max: 99.6 (18 @ 16:03)    Vital Signs Last 24 Hrs  HR: 111 (18 @ 10:27) (102 - 117)  BP: 108/66 (18 @ 10:27) (108/66 - 176/89)  RR: 18 (18 @ 10:27)  SpO2: 95% (18 @ 10:27) (94% - 97%)  Wt(kg): --    PHYSICAL EXAM:  General: non-toxic, sitting up in bed but leaning to the right  HEAD/EYES: anicteric, PERRL  ENT:  supple  Cardiovascular:   S1, S2  Respiratory:  clear bilaterally  GI:  soft, non-tender, normal bowel sounds  :  no CVA tenderness   Musculoskeletal:  tenderness lower back and over let hip  Neurologic:  grossly non-focal, no motor sensory deficits, unable to stand  Skin:  no rash  Lymph: no lymphadenopathy  Psychiatric:  appropriate affect  Vascular:  no phlebitis                                11.1   17.39 )-----------( 532      ( 2018 07:47 )             34.5       -    142  |  100  |  21  ----------------------------<  161<H>  3.8   |  27  |  0.70    Ca    9.7      2018 06:55  Phos  2.8       Mg     1.8         TPro  7.9  /  Alb  4.0  /  TBili  0.3  /  DBili  x   /  AST  11  /  ALT  11  /  AlkPhos  96  -      Urinalysis Basic - ( 10 Apr 2018 16:44 )    Color: PL Yellow / Appearance: SL Turbid / S.014 / pH: x  Gluc: x / Ketone: Negative  / Bili: Small / Urobili: Negative   Blood: x / Protein: Negative / Nitrite: Negative   Leuk Esterase: Negative / RBC: 0-2 /HPF / WBC 0-2 /HPF   Sq Epi: x / Non Sq Epi: Few /HPF / Bacteria: x        MICROBIOLOGY:  .Urine Clean Catch (Midstream)  04-10-18   <10,000 CFU/ml  Normal Urogenital ami present  --  --      .Blood Blood-Peripheral  04-10-18   No growth to date.  --  --              v            RADIOLOGY:  < from: MR Cervical Spine w/wo IV Cont (18 @ 21:03) >  IMPRESSION:  There is no cord compression along the cervical or thoracic spine. The   spinal cord demonstrates normal course and caliber.    There is abnormal enhancement involving the entire cauda equina extending   to the ventral and dorsal aspects of the lower thoracic cord. There is   abnormal epidural complex possibly multiloculated abscess versus phlegmon   centered at the left L4-L5 facet joint, possibly involving the L5-S1   facet.  Severe spinal canal stenosis with compression of all descending   enhancing nerves at L4-L5 isseen. There is abnormal enhancement of the   intervertebral disc bordering this process at L4-L5 and L5-S1.    The constellation of these findings is most consistent with left L4-L5,   possibly L5-S1 facetitis with resulting epidural phlegmon versus complex   abscess spanning L3-L5 and arachnoiditis involving the cauda equina. The   differential diagnosis of sarcoidosis versus lymphoma are not entirely   excluded; please correlate with patient history, and clinical and   laboratory exam findings.    < end of copied text >    < from: MR Head w/wo IV Cont (18 @ 21:02) >  IMPRESSION:    A subcentimeter nonspecific focus of enhancement involves the right   parietal periventricular white matter. Differential considerations   include a small subacute infarct with hemorrhagic transformation, a focus   of infection-cerebritis, a small mass, or atypical demyelination.    Extensive sinusitis.    < end of copied text >

## 2018-04-12 NOTE — PROGRESS NOTE ADULT - PROBLEM SELECTOR PLAN 3
- Patient with obvious mood disturbance today but does not have any psychiatric history documented. Agitation present in ED.   - Psych c/s called, c/w Haldol 1mg q6H prn  -1 to 1 observation at this time  -Haldol 1mg q6H prn  -UA shows no evidence of infection; f/u UCx  -F/u BCx- ngtd  -check tsh, rpr

## 2018-04-13 LAB
ANION GAP SERPL CALC-SCNC: 15 MMOL/L — SIGNIFICANT CHANGE UP (ref 5–17)
APTT BLD: 35.2 SEC — SIGNIFICANT CHANGE UP (ref 27.5–37.4)
BUN SERPL-MCNC: 19 MG/DL — SIGNIFICANT CHANGE UP (ref 7–23)
CALCIUM SERPL-MCNC: 9.5 MG/DL — SIGNIFICANT CHANGE UP (ref 8.4–10.5)
CHLORIDE SERPL-SCNC: 100 MMOL/L — SIGNIFICANT CHANGE UP (ref 96–108)
CO2 SERPL-SCNC: 26 MMOL/L — SIGNIFICANT CHANGE UP (ref 22–31)
CREAT SERPL-MCNC: 0.71 MG/DL — SIGNIFICANT CHANGE UP (ref 0.5–1.3)
CRP SERPL-MCNC: 2 MG/DL — HIGH (ref 0–0.4)
CRYPTOC AG FLD QL: NEGATIVE — SIGNIFICANT CHANGE UP
ERYTHROCYTE [SEDIMENTATION RATE] IN BLOOD: 53 MM/HR — HIGH (ref 0–20)
GLUCOSE SERPL-MCNC: 171 MG/DL — HIGH (ref 70–99)
GRAM STN FLD: SIGNIFICANT CHANGE UP
HCT VFR BLD CALC: 30.1 % — LOW (ref 34.5–45)
HGB BLD-MCNC: 10.1 G/DL — LOW (ref 11.5–15.5)
HIV 1+2 AB+HIV1 P24 AG SERPL QL IA: SIGNIFICANT CHANGE UP
INR BLD: 0.99 RATIO — SIGNIFICANT CHANGE UP (ref 0.88–1.16)
MCHC RBC-ENTMCNC: 31.6 PG — SIGNIFICANT CHANGE UP (ref 27–34)
MCHC RBC-ENTMCNC: 33.6 GM/DL — SIGNIFICANT CHANGE UP (ref 32–36)
MCV RBC AUTO: 94.1 FL — SIGNIFICANT CHANGE UP (ref 80–100)
PLATELET # BLD AUTO: 457 K/UL — HIGH (ref 150–400)
POTASSIUM SERPL-MCNC: 3.4 MMOL/L — LOW (ref 3.5–5.3)
POTASSIUM SERPL-SCNC: 3.4 MMOL/L — LOW (ref 3.5–5.3)
PROTHROM AB SERPL-ACNC: 11.2 SEC — SIGNIFICANT CHANGE UP (ref 10–13.1)
RBC # BLD: 3.2 M/UL — LOW (ref 3.8–5.2)
RBC # FLD: 13.5 % — SIGNIFICANT CHANGE UP (ref 10.3–14.5)
SODIUM SERPL-SCNC: 141 MMOL/L — SIGNIFICANT CHANGE UP (ref 135–145)
SPECIMEN SOURCE: SIGNIFICANT CHANGE UP
WBC # BLD: 10.96 K/UL — HIGH (ref 3.8–10.5)
WBC # FLD AUTO: 10.96 K/UL — HIGH (ref 3.8–10.5)

## 2018-04-13 PROCEDURE — 99233 SBSQ HOSP IP/OBS HIGH 50: CPT | Mod: GC

## 2018-04-13 PROCEDURE — 77003 FLUOROGUIDE FOR SPINE INJECT: CPT | Mod: 26

## 2018-04-13 PROCEDURE — 99232 SBSQ HOSP IP/OBS MODERATE 35: CPT

## 2018-04-13 PROCEDURE — 71250 CT THORAX DX C-: CPT | Mod: 26

## 2018-04-13 PROCEDURE — 62270 DX LMBR SPI PNXR: CPT

## 2018-04-13 RX ORDER — POTASSIUM CHLORIDE 20 MEQ
20 PACKET (EA) ORAL
Qty: 0 | Refills: 0 | Status: COMPLETED | OUTPATIENT
Start: 2018-04-13 | End: 2018-04-13

## 2018-04-13 RX ADMIN — Medication 15 MILLIGRAM(S): at 16:06

## 2018-04-13 RX ADMIN — Medication 15 MILLIGRAM(S): at 05:00

## 2018-04-13 RX ADMIN — Medication 250 MILLIGRAM(S): at 18:02

## 2018-04-13 RX ADMIN — Medication 15 MILLIGRAM(S): at 16:36

## 2018-04-13 RX ADMIN — Medication 15 MILLIGRAM(S): at 04:44

## 2018-04-13 RX ADMIN — HEPARIN SODIUM 5000 UNIT(S): 5000 INJECTION INTRAVENOUS; SUBCUTANEOUS at 21:35

## 2018-04-13 RX ADMIN — MEROPENEM 200 MILLIGRAM(S): 1 INJECTION INTRAVENOUS at 21:35

## 2018-04-13 RX ADMIN — MEROPENEM 200 MILLIGRAM(S): 1 INJECTION INTRAVENOUS at 06:19

## 2018-04-13 RX ADMIN — MEROPENEM 200 MILLIGRAM(S): 1 INJECTION INTRAVENOUS at 16:06

## 2018-04-13 RX ADMIN — Medication 20 MILLIEQUIVALENT(S): at 09:30

## 2018-04-13 RX ADMIN — Medication 15 MILLIGRAM(S): at 23:52

## 2018-04-13 RX ADMIN — HEPARIN SODIUM 5000 UNIT(S): 5000 INJECTION INTRAVENOUS; SUBCUTANEOUS at 14:03

## 2018-04-13 RX ADMIN — Medication 20 MILLIEQUIVALENT(S): at 14:04

## 2018-04-13 RX ADMIN — Medication 250 MILLIGRAM(S): at 06:36

## 2018-04-13 NOTE — PROGRESS NOTE ADULT - PROBLEM SELECTOR PLAN 1
- MR lumbar spine suspicious for phlegmon w/ arachnoiditis vs malignancy   - NSG consulted, recs appreciated: no surgery advised  - F/u CSF results - so far shows low glucose, elevated protein, elevated LDH, inc nucleated cells.   - ID onboard, recommend c/w vanc/tee  - Consider CT C/A/P if LP concerning for malignancy - as of now, neuroradiology Black leaning towards infectious process so will hold off imaging for now.  - CSF PCR neg, gram stain neg  - F/u full report - neurorads may need to do add'l LP under anesthesia to collect more CSF  - Dr. Muhammad of neuroradiology aware  - Elevated ESR and CRP noted; will continue to trend daily  - BCx NGTD  - F/u UCx  - Pain control - c/w NSAIDs and Tylenol for tolerable pain, oxycodone for severe pain - MR lumbar spine suspicious for phlegmon w/ arachnoiditis vs malignancy   - NSG consulted, recs appreciated: no surgery advised  - Patient s/p LP x 2 by neurorads  - F/u CSF results - so far shows low glucose, elevated protein, elevated LDH, inc nucleated cells.   - F/u cytology and flow cytometry  - ID onboard, recommend c/w vanc/tee  - Consider CT C/A/P if LP concerning for malignancy - as of now, neuroradiology Black leaning towards infectious process so will hold off imaging for now.  - CSF PCR neg, gram stain neg  - Elevated ESR and CRP noted; will continue to trend daily  - BCx NGTD  - F/u UCx  - Pain control - c/w NSAIDs and Tylenol for tolerable pain, oxycodone for severe pain

## 2018-04-13 NOTE — PROGRESS NOTE ADULT - ATTENDING COMMENTS
Multiple falls with low back pain   -MRI concerning for L5-S1 facetitis with resulting epidural phlegmon versus complex   abscess spanning L3-L5 and arachnoiditis involving the cauda equina  -appreciate ID recs, LP and if non-diagnostic biopsy   -recommend rheum eval   -Nsx recs noted   -pain control per primary team   -PT

## 2018-04-13 NOTE — PROGRESS NOTE ADULT - SUBJECTIVE AND OBJECTIVE BOX
Follow Up:      Inverval History/ROS:Patient is a 62y old  Female who presents with a chief complaint of CC: "I need an emergent MRI" (10 Apr 2018 10:20)      Allergies    No Known Allergies    Intolerances        ANTIMICROBIALS:  meropenem  IVPB 2000 every 8 hours  vancomycin  IVPB 1000 every 12 hours      OTHER MEDS:  acetaminophen   Tablet. 650 milliGRAM(s) Oral every 6 hours PRN  haloperidol    Injectable 1 milliGRAM(s) IV Push every 6 hours PRN  heparin  Injectable 5000 Unit(s) SubCutaneous every 8 hours  influenza   Vaccine 0.5 milliLiter(s) IntraMuscular once  ketorolac   Injectable 15 milliGRAM(s) IV Push every 6 hours PRN  oxyCODONE    IR 5 milliGRAM(s) Oral every 6 hours PRN      Vital Signs Last 24 Hrs  T(C): 36.8 (13 Apr 2018 08:11), Max: 37.1 (13 Apr 2018 04:32)  T(F): 98.3 (13 Apr 2018 08:11), Max: 98.8 (13 Apr 2018 04:32)  HR: 109 (13 Apr 2018 08:11) (98 - 109)  BP: 123/72 (13 Apr 2018 08:11) (113/76 - 123/72)  BP(mean): --  RR: 18 (13 Apr 2018 08:11) (18 - 18)  SpO2: 96% (13 Apr 2018 08:11) (94% - 97%)    PHYSICAL EXAM:  General: [ ] non-toxic  HEAD/EYES: [ ] PERRL [ ] white sclera [ ] icterus  ENT:  [ ] normal [ ] supple [ ] thrush [ ] pharyngeal exudate  Cardiovascular:   [ ] murmur [ ] normal [ ] PPM/AICD  Respiratory:  [ ] clear to ausculation bilaterally  GI:  [ ] soft, non-tender, normal bowel sounds  :  [ ] ryan [ ] no CVA tenderness   Musculoskeletal:  [ ] no synovitis  Neurologic:  [ ] non-focal exam   Skin:  [ ] no rash  Lymph: [ ] no lymphadenopathy  Psychiatric:  [ ] appropriate affect [ ] alert & oriented  Lines:  [ ] no phlebitis [ ] central line                                10.1   10.96 )-----------( 457      ( 13 Apr 2018 07:49 )             30.1       04-13    141  |  100  |  19  ----------------------------<  171<H>  3.4<L>   |  26  |  0.71    Ca    9.5      13 Apr 2018 06:39  Phos  2.8     04-12  Mg     1.8     04-12    TPro  7.9  /  Alb  4.0  /  TBili  0.3  /  DBili  x   /  AST  11  /  ALT  11  /  AlkPhos  96  04-12          MICROBIOLOGY:Culture Results:   Testing in progress (04-12-18 @ 17:41)  Culture Results:   <10,000 CFU/ml  Normal Urogenital ami present (04-10-18 @ 19:04)  Culture Results:   No growth to date. (04-10-18 @ 18:55)  Culture Results:   No growth to date. (04-10-18 @ 18:55)      RADIOLOGY:

## 2018-04-13 NOTE — PRE-OP CHECKLIST - HEIGHT IN CM
HISTORY OF PRESENT ILLNESS:  Kathi Casanova is a 81 year old female with a history of dementia here with her daughter who presents with an episode of difficulty swallowing this AM that has now resolved. Daughter reports mother complained she was having trouble swallowing her pills this a.m. Patient apparently coughed up some clear phlegm after swallowing the pills. Patient apparently does have a history of esophageal stricture. Last dilation was in August of 2016. Daughter thought mom was having trouble with her breathing so brought her here to the clinic this morning for evaluation. Daughter does acknowledge mother seems to be doing fine now.    MEDICAL HISTORY:  Patient Active Problem List   Diagnosis   • Hypertension   • Osteoporosis   • Kyphoscoliosis   • RLE Radiculopathy   • Tubulovillous adenoma   • Adenoma of large intestine   • HLD (hyperlipidemia)   • Anxiety and depression   • Dementia       MEDICATIONS:  Outpatient Prescriptions Marked as Taking for the 2/18/17 encounter (Walk In) with Beatris Graff NP   Medication Sig Dispense Refill   • mirtazapine (REMERON) 30 MG tablet TAKE ONE TABLET BY MOUTH NIGHTLY 30 tablet 2   • albuterol 108 (90 BASE) MCG/ACT inhaler Inhale 2 puffs into the lungs every 4 hours as needed for Shortness of Breath or Wheezing. 1 Inhaler 0   • umeclidinium-vilanterol (ANORO ELLIPTA) 62.5-25 MCG/INH inhaler Inhale 1 inhalation into the lungs daily. 60 each 5   • montelukast (SINGULAIR) 10 MG tablet Take 1 tablet by mouth Every evening. 30 tablet 5   • memantine (NAMENDA) 10 MG tablet Take 1 tablet by mouth 2 times daily. 60 tablet 11   • donepezil (ARICEPT) 10 MG tablet TAKE ONE TABLET BY MOUTH NIGHTLY 30 tablet 6   • polyethylene glycol (MIRALAX) powder Take 17 g by mouth daily. 527 g 11   • hydrocortisone (ANUSOL-HC) 2.5 % rectal cream Apply topically to anal area three times daily as needed for pain before bowel movement 30 g 2   • bisacodyl (DULCOLAX) 5 MG EC tablet Take 5 mg  by mouth nightly.     • Probiotic Product (PROBIOTIC DAILY PO)      • ASPIRIN PO      • omeprazole (PRILOSEC) 20 MG capsule Take 20 mg by mouth daily.     • Multiple Vitamin (MULTIVITAMIN) capsule Take 1 capsule by mouth daily.     • cholecalciferol (VITAMIN D3) 1000 UNITS tablet Take 1,000 Units by mouth daily.     • calcium carbonate-vitamin D (CALTRATE+D) 600-400 MG-UNIT per tablet Take 1 tablet by mouth daily.         ALLERGIES:  No Known Allergies    SOCIAL HISTORY:  Patient Active Problem List   Smoking Status   • Former Smoker   • Packs/day: 0.50   • Years: 45.00   • Types: Cigarettes   • Quit date: 7/26/2001   Smokeless Tobacco   • Never Used       REVIEW OF SYSTEMS  Constitutional:  Denies fever.     HEENT:  As above. Denies sore throat. Denies dysphasia at this time.   Cardiovascular:  Denies chest pain or shortness of breath. Respiratory:  Denies any cough. No wheeze or shortness of breath.  Abdomen: Denies any abdominal pain.  Extremities: No swelling reported.    OBJECTIVE:   Visit Vitals   • /77   • Pulse 82   • Temp 97.1 °F (36.2 °C)   • Resp 20   • Ht 4' 11\" (1.499 m)   • Wt 51.7 kg   • SpO2 95%   • BMI 23.03 kg/m2     General:  Patient is alert.  In no apparent respiratory distress.  Appears well groomed, well nourished. Patient was noted to be able to swallow water while here in the clinic without any difficulty.  Psychiatric:  In good spirits.  Patient does answer my questions appropriately.    HEENT:  Oral mucosa is moist.. Throat - no erythema or exudate noted.   Neck:  Supple.  No anterior, posterior chain, or supraclavicular adenopathy.    Heart:  Regular rate and rhythm without murmur, gallop or rub.  Lungs:  Breathing is non labored.  Lungs are clear throughout anterior and posterior chest without any obvious wheeze, rales, or rhonchi.    Abdomen: Patient has active bowel sounds throughout all 4 quadrants. Abdomen is soft and nontender throughout all 4 quadrants. No organomegaly or  masses. No rebound tenderness.  Extremities: No extremity edema noted.    Chest x-ray was declined by daughter.    ASSESSMENT:  Dysphagia, unspecified type      PLAN:   Daughter was reassured that the patient's lungs are clear at this time. Patient's daughter declined chest x-ray for patient as above.   Recommended that daughter contact gastroenterology, Dr. Kay's office, next week and discuss patient's symptoms and whether patient needs to be seen for a follow-up visit regarding her dysphagia.   Patient's daughter verbalizes understanding and is in agreement with the above plan.       [phone]

## 2018-04-13 NOTE — PROGRESS NOTE ADULT - SUBJECTIVE AND OBJECTIVE BOX
Patient is a 62y old  Female who presents with a chief complaint of CC: "I need an emergent MRI" (10 Apr 2018 10:20)    Seen and examined as patient was being taken for a procedure.      PHYSICAL EXAM:  AAOX3   CN: II-XII intact   Motor: b/l UE 5/5, b/l LE moving extremities symmetrically, did not want to lie down for assessment because she was comfortable sitting in bed.   Sensation: intact to light touch   Refexles: diminished   Coordination: FNF intact   Gait: deferred     Vital Signs Last 24 Hrs  T(C): 36.8 (13 Apr 2018 08:11), Max: 37.1 (13 Apr 2018 04:32)  T(F): 98.3 (13 Apr 2018 08:11), Max: 98.8 (13 Apr 2018 04:32)  HR: 109 (13 Apr 2018 08:11) (98 - 109)  BP: 123/72 (13 Apr 2018 08:11) (113/76 - 123/72)  BP(mean): --  RR: 18 (13 Apr 2018 08:11) (18 - 18)  SpO2: 96% (13 Apr 2018 08:11) (94% - 97%)    MEDICATIONS  (STANDING):  heparin  Injectable 5000 Unit(s) SubCutaneous every 8 hours  influenza   Vaccine 0.5 milliLiter(s) IntraMuscular once  meropenem  IVPB 2000 milliGRAM(s) IV Intermittent every 8 hours  potassium chloride    Tablet ER 20 milliEquivalent(s) Oral every 2 hours  vancomycin  IVPB 1000 milliGRAM(s) IV Intermittent every 12 hours    MEDICATIONS  (PRN):  acetaminophen   Tablet. 650 milliGRAM(s) Oral every 6 hours PRN Mild Pain (1 - 3)  haloperidol    Injectable 1 milliGRAM(s) IV Push every 6 hours PRN agitation  ketorolac   Injectable 15 milliGRAM(s) IV Push every 6 hours PRN Moderate Pain (4 - 6)  oxyCODONE    IR 5 milliGRAM(s) Oral every 6 hours PRN Severe Pain (7 - 10)      < from: MR Cervical Spine w/wo IV Cont (04.11.18 @ 21:03) >  IMPRESSION:  There is no cord compression along the cervical or thoracic spine. The   spinal cord demonstrates normal course and caliber.    There is abnormal enhancement involving the entire cauda equina extending   to the ventral and dorsal aspects of the lower thoracic cord. There is   abnormal epidural complex possibly multiloculated abscess versus phlegmon   centered at the left L4-L5 facet joint, possibly involving the L5-S1   facet.  Severe spinal canal stenosis with compression of all descending   enhancing nerves at L4-L5 isseen. There is abnormal enhancement of the   intervertebral disc bordering this process at L4-L5 and L5-S1.    The constellation of these findings is most consistent with left L4-L5,   possibly L5-S1 facetitis with resulting epidural phlegmon versus complex   abscess spanning L3-L5 and arachnoiditis involving the cauda equina. The   differential diagnosis of sarcoidosis versus lymphoma are not entirely   excluded; please correlate with patient history, and clinical and   laboratory exam findings.    < end of copied text >    < from: MR Head w/wo IV Cont (04.11.18 @ 21:02) >    IMPRESSION:    A subcentimeter nonspecific focus of enhancement involves the right   parietal periventricular white matter. Differential considerations   include a small subacute infarct with hemorrhagic transformation, a focus   of infection-cerebritis, a small mass, or atypical demyelination.    Extensive sinusitis.    < end of copied text >

## 2018-04-13 NOTE — PROGRESS NOTE ADULT - SUBJECTIVE AND OBJECTIVE BOX
Clinical Indication: lumbar spine infection vs tumore    PREPROCEDURE:    Patient presents for diagnostic lumbar puncture w anesthesia sedation.  Risks and benefits were discussed with patient and/or health care proxy.   Risks include but are not limited to headache, bleeding, infection and nerve damage.    Patient and/or health care proxy understands and consents to procedure.    POSTPROCEDURE:    Lumbar puncture was performed at the L1-2  level using a 20 gauge needle using fluoroscopic guidance.  5 cc of CSF  was collected and hand delivered to the lab    Patient tolerated the procedure well and left the department in stable condition.

## 2018-04-13 NOTE — PROGRESS NOTE ADULT - PROBLEM SELECTOR PLAN 3
- Patient with obvious mood disturbance today but does not have any psychiatric history documented. Agitation present in ED.   - Psych c/s called, c/w Haldol 1mg q6H prn  - C/w 1 to 1 observation at this time  -UA shows no evidence of infection; f/u UCx  -F/u BCx- NGTD  -F/u HIV, TSH, syphilis screen

## 2018-04-13 NOTE — PROGRESS NOTE ADULT - SUBJECTIVE AND OBJECTIVE BOX
CONTACT INFO:  Aime Muñoz MD  PGY-1 | Internal Medicine  Spectra: [phone]    Patient is a 62y old  Female who presents with a chief complaint of CC: "I need an emergent MRI" (10 Apr 2018 10:20)      SUBJECTIVE / OVERNIGHT EVENTS: No acute overnight events. Patient started on antibiotics by ID for likely infectious process, though malignancy remains in the differential. Patient still has lower back pain and L posterior leg pain. 1:1 was renewed overnight.   On tele:    REVIEW OF SYSTEMS:  14-point ROS was conducted with the patient and is negative except for those listed above.      MEDICATIONS  (STANDING):  heparin  Injectable 5000 Unit(s) SubCutaneous every 8 hours  influenza   Vaccine 0.5 milliLiter(s) IntraMuscular once  meropenem  IVPB 2000 milliGRAM(s) IV Intermittent every 8 hours  vancomycin  IVPB 1000 milliGRAM(s) IV Intermittent every 12 hours    MEDICATIONS  (PRN):  acetaminophen   Tablet. 650 milliGRAM(s) Oral every 6 hours PRN Mild Pain (1 - 3)  haloperidol    Injectable 1 milliGRAM(s) IV Push every 6 hours PRN agitation  ketorolac   Injectable 15 milliGRAM(s) IV Push every 6 hours PRN Moderate Pain (4 - 6)  oxyCODONE    IR 5 milliGRAM(s) Oral every 6 hours PRN Severe Pain (7 - 10)      T(C): 37.1 (04-13-18 @ 04:32), Max: 37.3 (04-12-18 @ 10:27)  HR: 105 (04-13-18 @ 04:32) (98 - 111)  BP: 113/76 (04-13-18 @ 04:32) (108/66 - 119/70)  RR: 18 (04-13-18 @ 04:32) (18 - 18)  SpO2: 94% (04-13-18 @ 04:32) (94% - 97%)    PHYSICAL EXAM:  HEENT: Head NC/AT  NECK: No deformity, supple  CHEST/LUNG: CTABL, no wheezes  CARDIOVASCULAR: RRR, no murmur/rubs/gallops. No LE edema. 2+ pulses 4/4 extremities  ABDOMEN: Soft, +BS  BACK: Point tenderness overlying lumbar spinous processes.   EXTREMITIES: No deformities  NEURO:  4/5 strength B/L UE. 5/5 strength B/L plantar flexion. 4/5 strength B/L hip flexors. Exam limited by poor cooperation at times.   PSYCH: Lethargic    LABS:                        11.1   17.39 )-----------( 532      ( 12 Apr 2018 07:47 )             34.5     04-13    141  |  100  |  19  ----------------------------<  171<H>  3.4<L>   |  26  |  0.71    Ca    9.5      13 Apr 2018 06:39  Phos  2.8     04-12  Mg     1.8     04-12    TPro  7.9  /  Alb  4.0  /  TBili  0.3  /  DBili  x   /  AST  11  /  ALT  11  /  AlkPhos  96  04-12            I&O's Summary    12 Apr 2018 07:01  -  13 Apr 2018 07:00  --------------------------------------------------------  IN: 1881 mL / OUT: 450 mL / NET: 1431 mL        MICROBIOLOGY:    RADIOLOGY: CONTACT INFO:  Aime Muñoz MD  PGY-1 | Internal Medicine  Spectra: [phone]    Patient is a 62y old  Female who presents with a chief complaint of CC: "I need an emergent MRI" (10 Apr 2018 10:20)      SUBJECTIVE / OVERNIGHT EVENTS: No acute overnight events. Patient started on antibiotics by ID for likely infectious process, though malignancy remains in the differential. Patient's lower back and leg pain are improved. 1:1 was renewed overnight. Patient received repeat LP by neurorads w/o issue.   On tele:    REVIEW OF SYSTEMS:  14-point ROS was conducted with the patient and is negative except for those listed above.      MEDICATIONS  (STANDING):  heparin  Injectable 5000 Unit(s) SubCutaneous every 8 hours  influenza   Vaccine 0.5 milliLiter(s) IntraMuscular once  meropenem  IVPB 2000 milliGRAM(s) IV Intermittent every 8 hours  vancomycin  IVPB 1000 milliGRAM(s) IV Intermittent every 12 hours    MEDICATIONS  (PRN):  acetaminophen   Tablet. 650 milliGRAM(s) Oral every 6 hours PRN Mild Pain (1 - 3)  haloperidol    Injectable 1 milliGRAM(s) IV Push every 6 hours PRN agitation  ketorolac   Injectable 15 milliGRAM(s) IV Push every 6 hours PRN Moderate Pain (4 - 6)  oxyCODONE    IR 5 milliGRAM(s) Oral every 6 hours PRN Severe Pain (7 - 10)      T(C): 37.1 (04-13-18 @ 04:32), Max: 37.3 (04-12-18 @ 10:27)  HR: 105 (04-13-18 @ 04:32) (98 - 111)  BP: 113/76 (04-13-18 @ 04:32) (108/66 - 119/70)  RR: 18 (04-13-18 @ 04:32) (18 - 18)  SpO2: 94% (04-13-18 @ 04:32) (94% - 97%)    PHYSICAL EXAM:  HEENT: Head NC/AT  NECK: No deformity, supple  CHEST/LUNG: CTABL, no wheezes  CARDIOVASCULAR: RRR, no murmur/rubs/gallops. No LE edema. 2+ pulses 4/4 extremities  ABDOMEN: Soft, +BS  BACK: Point tenderness overlying lumbar spinous processes.   EXTREMITIES: No deformities  NEURO:  4/5 strength B/L UE. 5/5 strength B/L plantar flexion. 4/5 strength B/L hip flexors. Exam limited by poor cooperation at times.   PSYCH: Lethargic    LABS:                        11.1   17.39 )-----------( 532      ( 12 Apr 2018 07:47 )             34.5     04-13    141  |  100  |  19  ----------------------------<  171<H>  3.4<L>   |  26  |  0.71    Ca    9.5      13 Apr 2018 06:39  Phos  2.8     04-12  Mg     1.8     04-12    TPro  7.9  /  Alb  4.0  /  TBili  0.3  /  DBili  x   /  AST  11  /  ALT  11  /  AlkPhos  96  04-12            I&O's Summary    12 Apr 2018 07:01  -  13 Apr 2018 07:00  --------------------------------------------------------  IN: 1881 mL / OUT: 450 mL / NET: 1431 mL        MICROBIOLOGY:  CSF PCR Panel (04.12.18 @ 18:41)    CSF PCR Result: [facility]: The meningitis/encephalitis (ME) panel (CSFPCR) is a PCR based assay that  screens for: Escherichia coli; Haemophilus influenzae; Listeria  monocytogenes; Neisseria meningitidis; Streptococcus agalactiae;  Streptococcus pneumoniae; CMV; Enterovirus; HSV-1; HSV-2; Human  herpesvirus 6; Parechovirus; VZV and Cryptococcus. Result should be  interpreted in context of clinical presentation, imaging and other lab  tests. Positive predictive value may be lower in patients with normal CSF  chemistry and cell count.    Culture - CSF with Gram Stain . (04.12.18 @ 17:41)    Gram Stain:   No organisms seen  No polymorphonuclear leukocytes seen  by cytocentrifuge    Specimen Source: .CSF CSF    Cerebrospinal Fluid Cell Count-1 (04.12.18 @ 15:28)    Total Nucleated Cell Count, CSF: 187 /uL    CSF Color: Straw    CSF Appearance: Clear    CSF Lymphocytes: 60 %    CSF Monocytes/Macrophages: 5 %    CSF Segmented Neutrophils: 35 %    Glucose, CSF (04.12.18 @ 15:13)    Glucose, CSF: <6: test repeated mg/dL        RADIOLOGY:

## 2018-04-13 NOTE — PROGRESS NOTE ADULT - ASSESSMENT
62 year old with DJD, s/p trigger point injection, presented with back pain.    Imaging raises concern for discitis/ OM with epidural phlegmon. Also ? subarachnoiditis.    She is s/p an LP.  Her LP results are difficult to interpret.  Her first LP had veery low glucose and high protein without a pmn predominance.  CSF culture is without growth and her CSF pcr was negative. Antibiotics were not given prior to the LP.  Bacterial meningitis is not likely    The Lp findings and imaging do raise the concern for sarcoid/ lymphoma/ less likely Tb.  It is also feasible that an LP could be abnormal if there was an epidural bacterial process.     The repeat LP looks very different- further confounding this case.     Check HIV status/ Check ACE level/ check Quantiferon  Check CT chest to look for other evidence fo sarcoid.    I think that we need a true tissue diagnosis.     If the CSF culture is negative at 48 hours, I would stop antimicrobials.  I would further an IR guided biopsy for both path and micro (routine culture, fungal culture, AFB).  If this is not feasible or the results are not diagnostic, I would consider an open biopsy.     I do not think the clinical presentation is consistent with a bacterial infection- and I feel additional tissue is needed to establish her diagnosis.     Call the ID service with questions or concerns over the weekend.  [phone]

## 2018-04-13 NOTE — PROGRESS NOTE ADULT - ASSESSMENT
63 y/o F PMHx significant for lumbar degenerative disc disease and fibromyalgia p/w chronic lumbago as well as progressively worsening LE weakness and now with complaints of inability to walk.  MRI with severe spinal canal stenosis and abnormal enhancement of cauda equina concerning for phlegmon vs complex abscess, facetitis with possibilities of lymphoma, sarcoid, aracnoiditis on differential

## 2018-04-13 NOTE — PROGRESS NOTE ADULT - ASSESSMENT
62F with history of spinal stenosis s/p paraspinal injections p/w worsening back pain and elevated serum markers concerning for infection. MRI images reviewed which suggest lumbar spine epidural infection with slight paraspinal involvement.  CSF suspicious for infectious etiology.     -No acute neurosurgical intervention indicated at this time  -Recommend IR aspiration or CT-guided aspiration of lesion for culture/gram stain if LP negative; if ct guided biopsy negative also then may reconsult nsgy as needed  -neurochecks q4h  -Follow up blood/csf cultures  -Continue to trend ESR/CRP

## 2018-04-13 NOTE — PROGRESS NOTE ADULT - ATTENDING COMMENTS
intractable back pain, and LE weakness--MR lumbar spine concerning for infection vs sarcoid vs lymphoma/malignancy vs TB. Likely a phlegmon. LP today with neurorads so far unrevealing. ID following and recommend broad spectrum abx but if cultures neg x 48 hours to stop. WBC and tachycardia consistent with SIRs, unclear at this time if this is infection/sepsis. Sending quant gold, ACE, CYNTHIA.  Per ID we need tissue biopsy for diagnosis. Will discuss options with both IR and neurosurg.  MR head with non specific right periventricular white matter lucency-will discuss with neuro. pt with some confusion and agitation of unclear etiology;; improved today  on 1:1 observation, psych consult appreciated  appreciate recs of neuro, ID, neuroradiology.

## 2018-04-13 NOTE — PROGRESS NOTE ADULT - SUBJECTIVE AND OBJECTIVE BOX
Examined at bedside.    PHYSICAL EXAMINATION:   Vital Signs Last 24 Hrs  Vital Signs Last 24 Hrs  T(C): 37.1 (13 Apr 2018 04:32), Max: 37.3 (12 Apr 2018 10:27)  T(F): 98.8 (13 Apr 2018 04:32), Max: 99.2 (12 Apr 2018 10:27)  HR: 105 (13 Apr 2018 04:32) (98 - 111)  BP: 113/76 (13 Apr 2018 04:32) (108/66 - 119/70)  BP(mean): --  RR: 18 (13 Apr 2018 04:32) (18 - 18)  SpO2: 94% (13 Apr 2018 04:32) (94% - 97%)    Uncooperative exam  AOx2 (missed place), requires encouragement to follow commands  CN: PERRL, EOMI, no facial droop  Motor: moves spontanously lowers, kicks out at examiner strong  Sensation intact to light touch  Reflexes: no clonus Examined at bedside.    PHYSICAL EXAMINATION:   Vital Signs Last 24 Hrs  Vital Signs Last 24 Hrs  T(C): 37.1 (13 Apr 2018 04:32), Max: 37.3 (12 Apr 2018 10:27)  T(F): 98.8 (13 Apr 2018 04:32), Max: 99.2 (12 Apr 2018 10:27)  HR: 105 (13 Apr 2018 04:32) (98 - 111)  BP: 113/76 (13 Apr 2018 04:32) (108/66 - 119/70)  BP(mean): --  RR: 18 (13 Apr 2018 04:32) (18 - 18)  SpO2: 94% (13 Apr 2018 04:32) (94% - 97%)    Uncooperative exam  AOx2 (missed place), requires encouragement to follow commands  CN: PERRL, EOMI, no facial droop  Motor: 5/5 lower except 4/5 BHF pain limited  Sensation intact to light touch  Reflexes: no clonus

## 2018-04-14 LAB
ANION GAP SERPL CALC-SCNC: 14 MMOL/L — SIGNIFICANT CHANGE UP (ref 5–17)
BUN SERPL-MCNC: 21 MG/DL — SIGNIFICANT CHANGE UP (ref 7–23)
CALCIUM SERPL-MCNC: 9.8 MG/DL — SIGNIFICANT CHANGE UP (ref 8.4–10.5)
CHLORIDE SERPL-SCNC: 104 MMOL/L — SIGNIFICANT CHANGE UP (ref 96–108)
CHOLEST SERPL-MCNC: 158 MG/DL — SIGNIFICANT CHANGE UP (ref 10–199)
CO2 SERPL-SCNC: 25 MMOL/L — SIGNIFICANT CHANGE UP (ref 22–31)
CREAT SERPL-MCNC: 0.68 MG/DL — SIGNIFICANT CHANGE UP (ref 0.5–1.3)
CRP SERPL-MCNC: 1.5 MG/DL — HIGH (ref 0–0.4)
ERYTHROCYTE [SEDIMENTATION RATE] IN BLOOD: 56 MM/HR — HIGH (ref 0–20)
GLUCOSE SERPL-MCNC: 147 MG/DL — HIGH (ref 70–99)
HBA1C BLD-MCNC: 7.5 % — HIGH (ref 4–5.6)
HCT VFR BLD CALC: 31 % — LOW (ref 34.5–45)
HDLC SERPL-MCNC: 46 MG/DL — SIGNIFICANT CHANGE UP (ref 40–125)
HGB BLD-MCNC: 10.2 G/DL — LOW (ref 11.5–15.5)
LIPID PNL WITH DIRECT LDL SERPL: 92 MG/DL — SIGNIFICANT CHANGE UP
MAGNESIUM SERPL-MCNC: 2 MG/DL — SIGNIFICANT CHANGE UP (ref 1.6–2.6)
MCHC RBC-ENTMCNC: 31.3 PG — SIGNIFICANT CHANGE UP (ref 27–34)
MCHC RBC-ENTMCNC: 32.9 GM/DL — SIGNIFICANT CHANGE UP (ref 32–36)
MCV RBC AUTO: 95.1 FL — SIGNIFICANT CHANGE UP (ref 80–100)
PHOSPHATE SERPL-MCNC: 2.9 MG/DL — SIGNIFICANT CHANGE UP (ref 2.5–4.5)
PLATELET # BLD AUTO: 476 K/UL — HIGH (ref 150–400)
POTASSIUM SERPL-MCNC: 4.4 MMOL/L — SIGNIFICANT CHANGE UP (ref 3.5–5.3)
POTASSIUM SERPL-SCNC: 4.4 MMOL/L — SIGNIFICANT CHANGE UP (ref 3.5–5.3)
RBC # BLD: 3.26 M/UL — LOW (ref 3.8–5.2)
RBC # FLD: 13.5 % — SIGNIFICANT CHANGE UP (ref 10.3–14.5)
SODIUM SERPL-SCNC: 143 MMOL/L — SIGNIFICANT CHANGE UP (ref 135–145)
TOTAL CHOLESTEROL/HDL RATIO MEASUREMENT: 3.4 RATIO — SIGNIFICANT CHANGE UP (ref 3.3–7.1)
TRIGL SERPL-MCNC: 99 MG/DL — SIGNIFICANT CHANGE UP (ref 10–149)
VANCOMYCIN TROUGH SERPL-MCNC: 8.3 UG/ML — LOW (ref 10–20)
WBC # BLD: 11.1 K/UL — HIGH (ref 3.8–10.5)
WBC # FLD AUTO: 11.1 K/UL — HIGH (ref 3.8–10.5)

## 2018-04-14 PROCEDURE — 70498 CT ANGIOGRAPHY NECK: CPT | Mod: 26

## 2018-04-14 PROCEDURE — 70496 CT ANGIOGRAPHY HEAD: CPT | Mod: 26

## 2018-04-14 PROCEDURE — 99233 SBSQ HOSP IP/OBS HIGH 50: CPT | Mod: GC

## 2018-04-14 RX ORDER — INSULIN LISPRO 100/ML
VIAL (ML) SUBCUTANEOUS AT BEDTIME
Qty: 0 | Refills: 0 | Status: DISCONTINUED | OUTPATIENT
Start: 2018-04-14 | End: 2018-04-17

## 2018-04-14 RX ORDER — GLUCAGON INJECTION, SOLUTION 0.5 MG/.1ML
1 INJECTION, SOLUTION SUBCUTANEOUS ONCE
Qty: 0 | Refills: 0 | Status: DISCONTINUED | OUTPATIENT
Start: 2018-04-14 | End: 2018-04-17

## 2018-04-14 RX ORDER — DEXTROSE 50 % IN WATER 50 %
12.5 SYRINGE (ML) INTRAVENOUS ONCE
Qty: 0 | Refills: 0 | Status: DISCONTINUED | OUTPATIENT
Start: 2018-04-14 | End: 2018-04-17

## 2018-04-14 RX ORDER — HALOPERIDOL DECANOATE 100 MG/ML
2 INJECTION INTRAMUSCULAR ONCE
Qty: 0 | Refills: 0 | Status: COMPLETED | OUTPATIENT
Start: 2018-04-14 | End: 2018-04-14

## 2018-04-14 RX ORDER — SODIUM CHLORIDE 9 MG/ML
1000 INJECTION, SOLUTION INTRAVENOUS
Qty: 0 | Refills: 0 | Status: DISCONTINUED | OUTPATIENT
Start: 2018-04-14 | End: 2018-04-17

## 2018-04-14 RX ORDER — ASPIRIN/CALCIUM CARB/MAGNESIUM 324 MG
81 TABLET ORAL DAILY
Qty: 0 | Refills: 0 | Status: DISCONTINUED | OUTPATIENT
Start: 2018-04-14 | End: 2018-04-14

## 2018-04-14 RX ORDER — VANCOMYCIN HCL 1 G
1250 VIAL (EA) INTRAVENOUS EVERY 12 HOURS
Qty: 0 | Refills: 0 | Status: DISCONTINUED | OUTPATIENT
Start: 2018-04-14 | End: 2018-04-14

## 2018-04-14 RX ORDER — DEXTROSE 50 % IN WATER 50 %
25 SYRINGE (ML) INTRAVENOUS ONCE
Qty: 0 | Refills: 0 | Status: DISCONTINUED | OUTPATIENT
Start: 2018-04-14 | End: 2018-04-17

## 2018-04-14 RX ORDER — INSULIN LISPRO 100/ML
VIAL (ML) SUBCUTANEOUS
Qty: 0 | Refills: 0 | Status: DISCONTINUED | OUTPATIENT
Start: 2018-04-14 | End: 2018-04-17

## 2018-04-14 RX ORDER — DEXTROSE 50 % IN WATER 50 %
1 SYRINGE (ML) INTRAVENOUS ONCE
Qty: 0 | Refills: 0 | Status: DISCONTINUED | OUTPATIENT
Start: 2018-04-14 | End: 2018-04-17

## 2018-04-14 RX ADMIN — OXYCODONE HYDROCHLORIDE 5 MILLIGRAM(S): 5 TABLET ORAL at 05:16

## 2018-04-14 RX ADMIN — HEPARIN SODIUM 5000 UNIT(S): 5000 INJECTION INTRAVENOUS; SUBCUTANEOUS at 05:19

## 2018-04-14 RX ADMIN — Medication 166.67 MILLIGRAM(S): at 06:59

## 2018-04-14 RX ADMIN — HALOPERIDOL DECANOATE 1 MILLIGRAM(S): 100 INJECTION INTRAMUSCULAR at 16:42

## 2018-04-14 RX ADMIN — HEPARIN SODIUM 5000 UNIT(S): 5000 INJECTION INTRAVENOUS; SUBCUTANEOUS at 16:13

## 2018-04-14 RX ADMIN — OXYCODONE HYDROCHLORIDE 5 MILLIGRAM(S): 5 TABLET ORAL at 04:46

## 2018-04-14 RX ADMIN — Medication 15 MILLIGRAM(S): at 00:23

## 2018-04-14 RX ADMIN — HALOPERIDOL DECANOATE 2 MILLIGRAM(S): 100 INJECTION INTRAMUSCULAR at 17:40

## 2018-04-14 RX ADMIN — HEPARIN SODIUM 5000 UNIT(S): 5000 INJECTION INTRAVENOUS; SUBCUTANEOUS at 21:06

## 2018-04-14 RX ADMIN — MEROPENEM 200 MILLIGRAM(S): 1 INJECTION INTRAVENOUS at 05:19

## 2018-04-14 RX ADMIN — Medication 81 MILLIGRAM(S): at 16:13

## 2018-04-14 NOTE — PROGRESS NOTE ADULT - SUBJECTIVE AND OBJECTIVE BOX
Patient is a 62y old  Female who presents with a chief complaint of CC: "I need an emergent MRI" (10 Apr 2018 10:20)  Seen and examined at bedside.  Pt denies any back pain.     PHYSICAL EXAM:  AAOX3   CN: II-XII intact   Motor: b/l UE 5/5, 4+/5 in HF rest 5/5   Sensation: intact to light touch and temp   Refexles: 1/4 throughout   Coordination: FNF intact   Gait: deferred      Vital Signs Last 24 Hrs  T(C): 37 (14 Apr 2018 05:17), Max: 37 (14 Apr 2018 05:17)  T(F): 98.6 (14 Apr 2018 05:17), Max: 98.6 (14 Apr 2018 05:17)  HR: 91 (14 Apr 2018 05:17) (91 - 104)  BP: 148/86 (14 Apr 2018 05:17) (127/75 - 148/86)  BP(mean): --  RR: 18 (14 Apr 2018 05:17) (18 - 18)  SpO2: 97% (14 Apr 2018 05:17) (96% - 97%)    MEDICATIONS  (STANDING):  heparin  Injectable 5000 Unit(s) SubCutaneous every 8 hours  influenza   Vaccine 0.5 milliLiter(s) IntraMuscular once    MEDICATIONS  (PRN):  acetaminophen   Tablet. 650 milliGRAM(s) Oral every 6 hours PRN Mild Pain (1 - 3)  haloperidol    Injectable 1 milliGRAM(s) IV Push every 6 hours PRN agitation  ketorolac   Injectable 15 milliGRAM(s) IV Push every 6 hours PRN Moderate Pain (4 - 6)  oxyCODONE    IR 5 milliGRAM(s) Oral every 6 hours PRN Severe Pain (7 - 10)

## 2018-04-14 NOTE — PROGRESS NOTE ADULT - SUBJECTIVE AND OBJECTIVE BOX
Visit Summary: Patient seen and evaluated at bedside.    Overnight Events: none    Exam:  T(C): 36.8 (04-14-18 @ 09:53), Max: 37 (04-14-18 @ 05:17)  HR: 95 (04-14-18 @ 09:53) (91 - 104)  BP: 135/80 (04-14-18 @ 09:53) (127/75 - 148/86)  RR: 18 (04-14-18 @ 09:53) (18 - 18)  SpO2: 98% (04-14-18 @ 09:53) (96% - 98%)  Wt(kg): --    PHYSICAL EXAM:    Constitutional: No Acute Distress     Neurological: AOx3, Following Commands    Motor exam:          Upper extremity                         Delt     Bicep     Tricep    HG                                                 R         5/5        5/5        5/5       5/5                                               L          5/5        5/5        5/5       5/5          Lower extremity                        HF         KF        KE       DF         PF                                                  R        5/5        5/5        5/5       5/5         5/5                                               L         5/5        5/5       5/5       5/5          5/5                                                 Sensation: [x] intact to light touch  [] decreased:     No clonus                            10.2   11.10 )-----------( 476      ( 14 Apr 2018 07:04 )             31.0     04-14    143  |  104  |  21  ----------------------------<  147<H>  4.4   |  25  |  0.68    Ca    9.8      14 Apr 2018 05:21  Phos  2.9     04-14  Mg     2.0     04-14    PT/INR - ( 13 Apr 2018 07:55 )   PT: 11.2 sec;   INR: 0.99 ratio         PTT - ( 13 Apr 2018 07:55 )  PTT:35.2 sec

## 2018-04-14 NOTE — PROVIDER CONTACT NOTE (OTHER) - BACKGROUND
pta dmitted with Dorsalgia. PMH of spinal stenosis, Fibromyalgia, herniated disc pt admitted with Dorsalgia. PMH of spinal stenosis, Fibromyalgia, herniated disc

## 2018-04-14 NOTE — PROGRESS NOTE ADULT - ATTENDING COMMENTS
Patient feels well today, persistent lower extremity pain otherwise in good spirits. For laminectomy on monday w/ neurosurg: RCRI: 0 points, class 1 risk. .4% of major cardiac risk. Per NSQIP calculator she's below average risk though with a 2.9% chance of serious complication, 3.3% chance of any complication, 1% chance of wound infection. Hold ASA. Patient feels well today, persistent lower extremity pain otherwise in good spirits. For laminectomy on monday w/ neurosurg: RCRI: 0 points, class 1 risk. .4% of major cardiac risk. Per NSQIP calculator she's below average risk though with a 2.9% chance of serious complication, 3.3% chance of any complication, 1% chance of wound infection. Hold ASA prior to procedure. Hold on rheumatologic eval, no e/o synovitis on exam, no rheum labs sent yet if we have clinical suspicion and alternative explanation for inflammatory markers; suspect loculated infection c/b discitis in setting of LBP w/ injections. For now HD stable off of abx, continue to monitor.

## 2018-04-14 NOTE — PROGRESS NOTE ADULT - PROBLEM SELECTOR PLAN 3
- Patient with obvious mood disturbance today but does not have any psychiatric history documented. Agitation present in ED.   - Psych c/s called, c/w Haldol 1mg q6H prn  - C/w 1 to 1 observation at this time  -UA shows no evidence of infection; f/u UCx  -F/u BCx- NGTD  -F/u HIV, TSH, syphilis screen - Patient with mood disturbance today but does not have any psychiatric history documented. Agitation present in ED.   - Psych c/s called, c/w Haldol 1mg q6H prn  - C/w 1 to 1 observation at this time  - UA shows no evidence of infection; f/u UCx  -F/u BCx- NGTD  -F/u HIV, TSH, syphilis screen
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
## 2018-04-14 NOTE — PROGRESS NOTE ADULT - ATTENDING COMMENTS
Multiple falls with concern for plegmon vs. abscess   -MRI concerning for L5-S1 facetitis with resulting epidural phlegmon versus complex   abscess spanning L3-L5 and arachnoiditis involving the cauda equina  -appreciate ID recs, LP results equivocal, continue management per ID   -recommend rheum eval given inflammatory process   -Nsx recs noted   -pain control per primary team   -PT.    Arachonitis involving the cauda equina   -likely need   Subacute infarct   -MRI brain concerning also for possible infection-cerebritis   -recommend CTA head and neck   -reheum eval for evaluation of inflammatory process   -check HbA1C and LDL   -ASA 81kmg daily for now   - Multiple falls with concern for plegmon vs. abscess   -MRI concerning for L5-S1 facetitis with resulting epidural phlegmon versus complex   abscess spanning L3-L5 and arachnoiditis involving the cauda equina  -appreciate ID recs, LP results equivocal, continue management per ID   -recommend rheum eval given inflammatory process   -Nsx recs noted   -pain control per primary team   -fall precaution   -PT.    Arachonitis involving the cauda equina per MRI   -would start prednisone 60mg and taper over 6 days however given concern for infection steroids contraindicated   -recommend I.D clearance prior to starting steroid     Subacute infarct   -MRI brain concerning also for possible infection-cerebritis   -recommend CTA head and neck   -reheum eval for evaluation of inflammatory process   -check HbA1C and LDL   -ASA 81kmg daily for now     Attempted to speak with primary attending, paged twice. Multiple falls with concern for plegmon vs. abscess   -MRI concerning for L5-S1 facetitis with resulting epidural phlegmon versus complex   abscess spanning L3-L5 and arachnoiditis involving the cauda equina  -appreciate ID recs, LP results equivocal, continue management per ID   -recommend rheum eval given inflammatory process   -Nsx recs noted   -pain control per primary team   -fall precaution   -PT.    Arachonitis involving the cauda equina per MRI   -would start prednisone 60mg and taper over 6 days however given concern for infection steroids contraindicated   -recommend I.D clearance prior to starting steroid     Subacute infarct   -MRI brain concerning also for possible infection-cerebritis   -recommend CTA head and neck   -rheum eval for evaluation of inflammatory process   -check HbA1C and LDL   -ASA 81mg daily    Attempted to speak with primary attending, paged twice.

## 2018-04-14 NOTE — PROGRESS NOTE ADULT - PROBLEM SELECTOR PLAN 1
- MR lumbar spine suspicious for phlegmon w/ arachnoiditis vs malignancy   - NSG consulted, recs appreciated: no surgery advised, they signed off  - Patient s/p LP x 2 by neurorads  - F/u CSF results - initial set showed low glucose, elevated protein, elevated LDH, inc nucleated cells.   - F/u cytology and flow cytometry  - ID onboard, they reviewed initial CSF and believe differential should include sarcoid, TB, lymphoma; Additionally, the repeat CSF studies are very different, with elevated glucose, which confounds case further  - Per ID, d/c abx today as CSF Cx will be neg x 48 hrs  - CSF PCR neg, gram stain neg  - F/u ACE, quanti gold, CYNTHIA  - AFB neg  - CT C/A/P was ordered to r/o metastatic disease or sarcoid, showed no urgent findings - f/u full read  - HIV neg  - Elevated ESR and CRP noted; will continue to trend daily  - BCx NGTD  - F/u UCx  - Pain control - c/w NSAIDs and Tylenol for tolerable pain, oxycodone for severe pain - MR lumbar spine suspicious for phlegmon w/ arachnoiditis vs malignancy   - NSG consulted, recs appreciated: no surgery advised, they signed off  - Patient s/p LP x 2 by neurorads  - F/u CSF results - initial set showed low glucose, elevated protein, elevated LDH, inc nucleated cells.   - F/u cytology and flow cytometry  - ID onboard, they reviewed initial CSF and believe differential should include sarcoid, TB, lymphoma; Additionally, the repeat CSF studies are very different, with elevated glucose, which confounds case further  - Per ID, d/c abx today as CSF Cx will be neg x 48 hrs  - CSF PCR neg, gram stain neg  - F/u ACE, quanti gold, CYNTHIA  - AFB neg  - CT C/A/P was ordered to r/o metastatic disease or sarcoid, showed no urgent findings - f/u full read  - HIV neg  - Elevated ESR and CRP noted; will continue to trend daily  - Neuro recs appreciated: hold steroids for now, start ASA 81qd for ?subacute infarct on MR brain, consider rheum eval (?sarcoid), ordering CTA head/neck to r/o vasculitis.   - BCx NGTD  - F/u UCx  - Pain control - c/w NSAIDs and Tylenol for tolerable pain, oxycodone for severe pain - MR lumbar spine suspicious for phlegmon w/ arachnoiditis vs malignancy   - NSG consulted, recs appreciated: no surgery advised, they signed off  - Patient s/p LP x 2 by neurorads  - F/u CSF results - initial set showed low glucose, elevated protein, elevated LDH, inc nucleated cells.   - F/u cytology and flow cytometry  - ID onboard, they reviewed initial CSF and believe differential should include sarcoid, TB, lymphoma; Additionally, the repeat CSF studies are very different, with elevated glucose, which confounds case further  - Per ID, d/c abx today as CSF Cx will be neg x 48 hrs  - CSF PCR neg, gram stain neg  - F/u serum ACE, quanti gold, CYNTHIA  - AFB neg  - CT C/A/P was ordered to r/o metastatic disease or sarcoid, showed no urgent findings - f/u full read  - HIV neg  - Elevated ESR and CRP noted; will continue to trend daily  - Neuro recs appreciated: hold steroids for now, start ASA 81qd for ?subacute infarct on MR brain, consider rheum eval (?sarcoid), ordering CTA head/neck to r/o vasculitis.   - BCx NGTD  - F/u UCx  - Pain control - c/w NSAIDs and Tylenol for tolerable pain, oxycodone for severe pain - MR lumbar spine suspicious for phlegmon w/ arachnoiditis vs malignancy   - NSG consulted, recs appreciated: laminectomy w/ biopsy on monday.  - Patient s/p LP x 2 by neurorads  - F/u CSF results - initial set showed low glucose, elevated protein, elevated LDH, inc nucleated cells.   - F/u cytology and flow cytometry  - ID onboard, they reviewed initial CSF and believe differential should include sarcoid, TB, lymphoma; Additionally, the repeat CSF studies are very different, with elevated glucose, which confounds case further  - Per ID, d/c abx today as CSF Cx will be neg x 48 hrs  - CSF PCR neg, gram stain neg  - F/u serum ACE, quanti gold, CYNTHIA  - AFB neg  - CT C/A/P was ordered to r/o metastatic disease or sarcoid, showed no urgent findings - f/u full read  - HIV neg  - Elevated ESR and CRP noted; will continue to trend daily  - Neuro recs appreciated: hold steroids for now, start ASA 81qd for ?subacute infarct on MR brain, consider rheum eval (?sarcoid), ordering CTA head/neck to r/o vasculitis.   - BCx NGTD  - F/u UCx  - Pain control - c/w NSAIDs and Tylenol for tolerable pain, oxycodone for severe pain

## 2018-04-14 NOTE — PROGRESS NOTE ADULT - PROBLEM SELECTOR PLAN 4
- Psych following, appreciate recs  - Patient with small focus on MR brain which could represent a small subacute infarct with hemorrhagic transformation, a focus   of infection-cerebritis, a small mass, or atypical demyelination.  - TTE neg for thrombus  - F/u HbA1c, lipid panel - Psych following, appreciate recs  - Patient with small focus on MR brain which could represent a small subacute infarct with hemorrhagic transformation, a focus   of infection-cerebritis, a small mass, or atypical demyelination.  - TTE neg for thrombus  - F/u HbA1c, lipid panel  - Starting ASA 81 qd per neuro recs - if this represents a subacute infarct.
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
## 2018-04-14 NOTE — PROGRESS NOTE ADULT - ASSESSMENT
62F with history of spinal stenosis s/p paraspinal injections p/w worsening back pain and elevated serum markers concerning for infection. MRI images reviewed which suggest lumbar spine epidural infection with slight paraspinal involvement. LP inconclusive x2. Neurosurgery called for possible biopsy as IR said no tragetable lesion.   - Will discuss possible biopsy with Dr. Rea  -Patient would like to discuss any intervention with her  prior to moving forward. Will follow up with patient prior to moving forward with biopsy  -neurochecks q4h  -Follow up blood cultures  -Continue to trend ESR/CRP  -Will continue to follow
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
## 2018-04-14 NOTE — PROGRESS NOTE ADULT - SUBJECTIVE AND OBJECTIVE BOX
Post-Anesthetic Evaluation:    The Patient was interviewed and evaluated    Vital Signs Last 24 Hrs  T(C): 36.8 (14 Apr 2018 09:53), Max: 37 (14 Apr 2018 05:17)  T(F): 98.3 (14 Apr 2018 09:53), Max: 98.6 (14 Apr 2018 05:17)  HR: 95 (14 Apr 2018 09:53) (91 - 104)  BP: 135/80 (14 Apr 2018 09:53) (127/75 - 148/86)  BP(mean): --  RR: 18 (14 Apr 2018 09:53) (18 - 18)  SpO2: 98% (14 Apr 2018 09:53) (96% - 98%)    Evaluation:      (X) No apparent complications or complaints regarding anesthesia care at this time  (X) All questions were answered    Condition:  (X) Stable      ( ) Guarded      ( ) Critical    Recommendations:  (X) None     ( ) Other:

## 2018-04-15 DIAGNOSIS — E11.9 TYPE 2 DIABETES MELLITUS WITHOUT COMPLICATIONS: ICD-10-CM

## 2018-04-15 LAB
ANA TITR SER: NEGATIVE — SIGNIFICANT CHANGE UP
ANION GAP SERPL CALC-SCNC: 16 MMOL/L — SIGNIFICANT CHANGE UP (ref 5–17)
APTT BLD: 37.3 SEC — SIGNIFICANT CHANGE UP (ref 27.5–37.4)
BLD GP AB SCN SERPL QL: NEGATIVE — SIGNIFICANT CHANGE UP
BUN SERPL-MCNC: 12 MG/DL — SIGNIFICANT CHANGE UP (ref 7–23)
CALCIUM SERPL-MCNC: 10.1 MG/DL — SIGNIFICANT CHANGE UP (ref 8.4–10.5)
CHLORIDE SERPL-SCNC: 101 MMOL/L — SIGNIFICANT CHANGE UP (ref 96–108)
CO2 SERPL-SCNC: 25 MMOL/L — SIGNIFICANT CHANGE UP (ref 22–31)
CREAT SERPL-MCNC: 0.55 MG/DL — SIGNIFICANT CHANGE UP (ref 0.5–1.3)
CRP SERPL-MCNC: 1.2 MG/DL — HIGH (ref 0–0.4)
CULTURE RESULTS: NO GROWTH — SIGNIFICANT CHANGE UP
CULTURE RESULTS: SIGNIFICANT CHANGE UP
CULTURE RESULTS: SIGNIFICANT CHANGE UP
ERYTHROCYTE [SEDIMENTATION RATE] IN BLOOD: 57 MM/HR — HIGH (ref 0–20)
GLUCOSE SERPL-MCNC: 156 MG/DL — HIGH (ref 70–99)
HCT VFR BLD CALC: 33.9 % — LOW (ref 34.5–45)
HGB BLD-MCNC: 10.8 G/DL — LOW (ref 11.5–15.5)
INR BLD: 1.03 RATIO — SIGNIFICANT CHANGE UP (ref 0.88–1.16)
MAGNESIUM SERPL-MCNC: 2.1 MG/DL — SIGNIFICANT CHANGE UP (ref 1.6–2.6)
MCHC RBC-ENTMCNC: 31.1 PG — SIGNIFICANT CHANGE UP (ref 27–34)
MCHC RBC-ENTMCNC: 31.9 GM/DL — LOW (ref 32–36)
MCV RBC AUTO: 97.7 FL — SIGNIFICANT CHANGE UP (ref 80–100)
PHOSPHATE SERPL-MCNC: 2.9 MG/DL — SIGNIFICANT CHANGE UP (ref 2.5–4.5)
PLATELET # BLD AUTO: 476 K/UL — HIGH (ref 150–400)
POTASSIUM SERPL-MCNC: 4.4 MMOL/L — SIGNIFICANT CHANGE UP (ref 3.5–5.3)
POTASSIUM SERPL-SCNC: 4.4 MMOL/L — SIGNIFICANT CHANGE UP (ref 3.5–5.3)
PROTHROM AB SERPL-ACNC: 11.1 SEC — SIGNIFICANT CHANGE UP (ref 9.8–12.7)
RBC # BLD: 3.47 M/UL — LOW (ref 3.8–5.2)
RBC # FLD: 13.5 % — SIGNIFICANT CHANGE UP (ref 10.3–14.5)
RH IG SCN BLD-IMP: POSITIVE — SIGNIFICANT CHANGE UP
SODIUM SERPL-SCNC: 142 MMOL/L — SIGNIFICANT CHANGE UP (ref 135–145)
SPECIMEN SOURCE: SIGNIFICANT CHANGE UP
WBC # BLD: 12.3 K/UL — HIGH (ref 3.8–10.5)
WBC # FLD AUTO: 12.3 K/UL — HIGH (ref 3.8–10.5)

## 2018-04-15 PROCEDURE — 99233 SBSQ HOSP IP/OBS HIGH 50: CPT | Mod: GC

## 2018-04-15 PROCEDURE — 99232 SBSQ HOSP IP/OBS MODERATE 35: CPT

## 2018-04-15 RX ORDER — HEPARIN SODIUM 5000 [USP'U]/ML
5000 INJECTION INTRAVENOUS; SUBCUTANEOUS EVERY 8 HOURS
Qty: 0 | Refills: 0 | Status: COMPLETED | OUTPATIENT
Start: 2018-04-15 | End: 2018-04-15

## 2018-04-15 RX ADMIN — HEPARIN SODIUM 5000 UNIT(S): 5000 INJECTION INTRAVENOUS; SUBCUTANEOUS at 17:23

## 2018-04-15 RX ADMIN — OXYCODONE HYDROCHLORIDE 5 MILLIGRAM(S): 5 TABLET ORAL at 06:52

## 2018-04-15 RX ADMIN — HEPARIN SODIUM 5000 UNIT(S): 5000 INJECTION INTRAVENOUS; SUBCUTANEOUS at 21:46

## 2018-04-15 RX ADMIN — HEPARIN SODIUM 5000 UNIT(S): 5000 INJECTION INTRAVENOUS; SUBCUTANEOUS at 05:19

## 2018-04-15 RX ADMIN — OXYCODONE HYDROCHLORIDE 5 MILLIGRAM(S): 5 TABLET ORAL at 06:29

## 2018-04-15 RX ADMIN — Medication 1: at 17:38

## 2018-04-15 NOTE — PROGRESS NOTE ADULT - SUBJECTIVE AND OBJECTIVE BOX
INFECTIOUS DISEASES FOLLOW UP-- Agata Boudreaux  [phone]    This is a follow up note for this  62yFemale with possible discitis/phlegmon in cauda equina region      ROS: grumpy  CONSTITUTIONAL:  No fever, good appetite  CARDIOVASCULAR:  No chest pain or palpitations  RESPIRATORY:  No dyspnea  GASTROINTESTINAL:  No nausea, vomiting, diarrhea, or abdominal pain  GENITOURINARY:  No dysuria  NEUROLOGIC:  No headache,     Allergies    No Known Allergies    Intolerances        ANTIBIOTICS/RELEVANT:  antimicrobials    immunologic:  influenza   Vaccine 0.5 milliLiter(s) IntraMuscular once    OTHER:  acetaminophen   Tablet. 650 milliGRAM(s) Oral every 6 hours PRN  dextrose 5%. 1000 milliLiter(s) IV Continuous <Continuous>  dextrose 50% Injectable 12.5 Gram(s) IV Push once  dextrose 50% Injectable 25 Gram(s) IV Push once  dextrose 50% Injectable 25 Gram(s) IV Push once  dextrose Gel 1 Dose(s) Oral once PRN  glucagon  Injectable 1 milliGRAM(s) IntraMuscular once PRN  haloperidol    Injectable 1 milliGRAM(s) IV Push every 6 hours PRN  heparin  Injectable 5000 Unit(s) SubCutaneous every 8 hours  insulin lispro (HumaLOG) corrective regimen sliding scale   SubCutaneous three times a day before meals  insulin lispro (HumaLOG) corrective regimen sliding scale   SubCutaneous at bedtime  ketorolac   Injectable 15 milliGRAM(s) IV Push every 6 hours PRN  oxyCODONE    IR 5 milliGRAM(s) Oral every 6 hours PRN      Objective:  Vital Signs Last 24 Hrs  T(C): 37.1 (15 Apr 2018 12:10), Max: 37.1 (15 Apr 2018 04:23)  T(F): 98.8 (15 Apr 2018 12:10), Max: 98.8 (15 Apr 2018 04:23)  HR: 112 (15 Apr 2018 12:10) (87 - 112)  BP: 125/77 (15 Apr 2018 12:10) (125/77 - 150/88)  BP(mean): --  RR: 18 (15 Apr 2018 12:10) (18 - 18)  SpO2: 96% (15 Apr 2018 12:10) (94% - 99%)    PHYSICAL EXAM:  Constitutional:no acute distress  Eyes:CHAUNCEY, EOMI  Ear/Nose/Throat: no oral lesions, 	  Respiratory: clear BL  Cardiovascular: S1S2  Gastrointestinal:soft, (+) BS, no tenderness  Extremities:no e/e/c  No Lymphadenopathy  IV sites not inflammed.    LABS:                        10.8   12.30 )-----------( 476      ( 15 Apr 2018 09:20 )             33.9     04-15    142  |  101  |  12  ----------------------------<  156<H>  4.4   |  25  |  0.55    Ca    10.1      15 Apr 2018 07:41  Phos  2.9     04-15  Mg     2.1     04-15            MICROBIOLOGY:            RECENT CULTURES:  04-13 @ 15:22  .CSF CSF - lumbar  --  --  --    No growth  --  04-12 @ 17:42  .CSF  --  --  --  --  --  04-12 @ 17:41  .CSF CSF  --  --  --    No growth  --  04-10 @ 19:04  .Urine Clean Catch (Midstream)  --  --  --    <10,000 CFU/ml  Normal Urogenital ami present  --  04-10 @ 18:55  .Blood Blood-Peripheral  --  --  --    No growth to date.  --      RADIOLOGY & ADDITIONAL STUDIES:    < from: MR Lumbar Spine w/wo IV Cont (04.11.18 @ 21:03) >  IMPRESSION:  There is no cord compression along the cervical or thoracic spine. The   spinal cord demonstrates normal course and caliber.    There is abnormal enhancement involving the entire cauda equina extending   to the ventral and dorsal aspects of the lower thoracic cord. There is   abnormal epidural complex possibly multiloculated abscess versus phlegmon   centered at the left L4-L5 facet joint, possibly involving the L5-S1   facet.  Severe spinal canal stenosis with compression of all descending   enhancing nerves at L4-L5 isseen. There is abnormal enhancement of the   intervertebral disc bordering this process at L4-L5 and L5-S1.    The constellation of these findings is most consistent with left L4-L5,   possibly L5-S1 facetitis with resulting epidural phlegmon versus complex   abscess spanning L3-L5 and arachnoiditis involving the cauda equina. The   differential diagnosis of sarcoidosis versus lymphoma are not entirely   excluded; please correlate with patient history, and clinical and   laboratory exam findings.    < end of copied text >

## 2018-04-15 NOTE — PROGRESS NOTE ADULT - PROBLEM SELECTOR PLAN 4
- Psych following, appreciate recs  - Patient with small focus on MR brain which could represent a small subacute infarct with hemorrhagic transformation, a focus   of infection-cerebritis, a small mass, or atypical demyelination.  - TTE neg for thrombus  - HbA1c 7.5; start SSI  - lipid panel WNL  - Starting ASA 81 qd per neuro recs - if this represents a subacute infarct. - Psych following, appreciate recs  - Patient with small focus on MR brain which could represent a small subacute infarct with hemorrhagic transformation, a focus   of infection-cerebritis, a small mass, or atypical demyelination.  - TTE neg for thrombus  - HbA1c 7.5; start SSI  - lipid panel WNL  - Was recommended to start ASA 81 qd per neuro recs - if this represents a subacute infarct. However, will hold as patient will receive surgery and consider resuming post-op.

## 2018-04-15 NOTE — PROGRESS NOTE ADULT - SUBJECTIVE AND OBJECTIVE BOX
CONTACT INFO:  Aime Molina MD  PGY-1 | Internal Medicine  Spectra: [phone]    Patient is a 62y old  Female who presents with a chief complaint of CC: "I need an emergent MRI" (10 Apr 2018 10:20)      SUBJECTIVE / OVERNIGHT EVENTS: No acute overnight events. Patient c/o her bed being uncomfortable. Patient denies CP, SOB. Her lumbago is improved.  On tele:    REVIEW OF SYSTEMS:  14-point ROS was conducted with the patient and is negative except for those listed above.      MEDICATIONS  (STANDING):  dextrose 5%. 1000 milliLiter(s) (50 mL/Hr) IV Continuous <Continuous>  dextrose 50% Injectable 12.5 Gram(s) IV Push once  dextrose 50% Injectable 25 Gram(s) IV Push once  dextrose 50% Injectable 25 Gram(s) IV Push once  heparin  Injectable 5000 Unit(s) SubCutaneous every 8 hours  influenza   Vaccine 0.5 milliLiter(s) IntraMuscular once  insulin lispro (HumaLOG) corrective regimen sliding scale   SubCutaneous three times a day before meals  insulin lispro (HumaLOG) corrective regimen sliding scale   SubCutaneous at bedtime    MEDICATIONS  (PRN):  acetaminophen   Tablet. 650 milliGRAM(s) Oral every 6 hours PRN Mild Pain (1 - 3)  dextrose Gel 1 Dose(s) Oral once PRN Blood Glucose LESS THAN 70 milliGRAM(s)/deciliter  glucagon  Injectable 1 milliGRAM(s) IntraMuscular once PRN Glucose LESS THAN 70 milligrams/deciliter  haloperidol    Injectable 1 milliGRAM(s) IV Push every 6 hours PRN agitation  ketorolac   Injectable 15 milliGRAM(s) IV Push every 6 hours PRN Moderate Pain (4 - 6)  oxyCODONE    IR 5 milliGRAM(s) Oral every 6 hours PRN Severe Pain (7 - 10)      T(C): 37.1 (04-15-18 @ 04:23), Max: 37.1 (04-15-18 @ 04:23)  HR: 105 (04-15-18 @ 04:23) (87 - 105)  BP: 150/88 (04-15-18 @ 04:23) (131/75 - 150/88)  RR: 18 (04-15-18 @ 04:23) (18 - 18)  SpO2: 94% (04-15-18 @ 04:23) (94% - 99%)    HEENT: Head NC/AT  NECK: No deformity, supple  CHEST/LUNG: CTABL, no wheezes  CARDIOVASCULAR: RRR, no murmur/rubs/gallops. No LE edema.  ABDOMEN: Soft, NT  BACK: point tenderness lumbar spinous processes  EXTREMITIES: No deformities. FROM x all 4 extremities.  NEURO: A/O x 3, irritable, otherwise non-focal neuro exam  PSYCH: Irritable    LABS:                        10.2   11.10 )-----------( 476      ( 14 Apr 2018 07:04 )             31.0     04-14    143  |  104  |  21  ----------------------------<  147<H>  4.4   |  25  |  0.68    Ca    9.8      14 Apr 2018 05:21  Phos  2.9     04-14  Mg     2.0     04-14      PT/INR - ( 13 Apr 2018 07:55 )   PT: 11.2 sec;   INR: 0.99 ratio         PTT - ( 13 Apr 2018 07:55 )  PTT:35.2 sec        I&O's Summary    14 Apr 2018 07:01  -  15 Apr 2018 07:00  --------------------------------------------------------  IN: 1090 mL / OUT: 2950 mL / NET: -1860 mL        MICROBIOLOGY:    RADIOLOGY:  < from: MR Cervical Spine w/wo IV Cont (04.11.18 @ 21:03) >  IMPRESSION:  There is no cord compression along the cervical or thoracic spine. The   spinal cord demonstrates normal course and caliber.    There is abnormal enhancement involving the entire cauda equina extending   to the ventral and dorsal aspects of the lower thoracic cord. There is   abnormal epidural complex possibly multiloculated abscess versus phlegmon   centered at the left L4-L5 facet joint, possibly involving the L5-S1   facet.  Severe spinal canal stenosis with compression of all descending   enhancing nerves at L4-L5 isseen. There is abnormal enhancement of the   intervertebral disc bordering this process at L4-L5 and L5-S1.    The constellation of these findings is most consistent with left L4-L5,   possibly L5-S1 facetitis with resulting epidural phlegmon versus complex   abscess spanning L3-L5 and arachnoiditis involving the cauda equina. The   differential diagnosis of sarcoidosis versus lymphoma are not entirely   excluded; please correlate with patient history, and clinical and   laboratory exam findings.    These findings were discussed with Dr. MOLINA at 4/12/2018 9:45 AM   by Dr. Hodges.       < end of copied text > CONTACT INFO:  Aime Molina MD  PGY-1 | Internal Medicine  Spectra: [phone]    Patient is a 62y old  Female who presents with a chief complaint of CC: "I need an emergent MRI" (10 Apr 2018 10:20)      SUBJECTIVE / OVERNIGHT EVENTS: No acute overnight events. Patient c/o her bed being uncomfortable yesterday. Patient denies CP, SOB. Her lumbago is improved. She is agreeing to stay in the hospital for now.   On tele:    REVIEW OF SYSTEMS:  14-point ROS was conducted with the patient and is negative except for those listed above.      MEDICATIONS  (STANDING):  dextrose 5%. 1000 milliLiter(s) (50 mL/Hr) IV Continuous <Continuous>  dextrose 50% Injectable 12.5 Gram(s) IV Push once  dextrose 50% Injectable 25 Gram(s) IV Push once  dextrose 50% Injectable 25 Gram(s) IV Push once  heparin  Injectable 5000 Unit(s) SubCutaneous every 8 hours  influenza   Vaccine 0.5 milliLiter(s) IntraMuscular once  insulin lispro (HumaLOG) corrective regimen sliding scale   SubCutaneous three times a day before meals  insulin lispro (HumaLOG) corrective regimen sliding scale   SubCutaneous at bedtime    MEDICATIONS  (PRN):  acetaminophen   Tablet. 650 milliGRAM(s) Oral every 6 hours PRN Mild Pain (1 - 3)  dextrose Gel 1 Dose(s) Oral once PRN Blood Glucose LESS THAN 70 milliGRAM(s)/deciliter  glucagon  Injectable 1 milliGRAM(s) IntraMuscular once PRN Glucose LESS THAN 70 milligrams/deciliter  haloperidol    Injectable 1 milliGRAM(s) IV Push every 6 hours PRN agitation  ketorolac   Injectable 15 milliGRAM(s) IV Push every 6 hours PRN Moderate Pain (4 - 6)  oxyCODONE    IR 5 milliGRAM(s) Oral every 6 hours PRN Severe Pain (7 - 10)      T(C): 37.1 (04-15-18 @ 04:23), Max: 37.1 (04-15-18 @ 04:23)  HR: 105 (04-15-18 @ 04:23) (87 - 105)  BP: 150/88 (04-15-18 @ 04:23) (131/75 - 150/88)  RR: 18 (04-15-18 @ 04:23) (18 - 18)  SpO2: 94% (04-15-18 @ 04:23) (94% - 99%)    PHYSICAL EXAM:  GENERAL: Sitting in chair, sleepy.  HEENT: Head NC/AT  NECK: No deformity, supple  CHEST/LUNG: CTABL, no wheezes  CARDIOVASCULAR: RRR, no murmur/rubs/gallops. No LE edema.  ABDOMEN: Soft, NT  BACK: point tenderness lumbar spinous processes  EXTREMITIES: No deformities. FROM x all 4 extremities.  NEURO: A/O x 3, tired this AM, otherwise non-focal neuro exam  PSYCH: Tired    LABS:                        10.2   11.10 )-----------( 476      ( 14 Apr 2018 07:04 )             31.0     04-14    143  |  104  |  21  ----------------------------<  147<H>  4.4   |  25  |  0.68    Ca    9.8      14 Apr 2018 05:21  Phos  2.9     04-14  Mg     2.0     04-14      PT/INR - ( 13 Apr 2018 07:55 )   PT: 11.2 sec;   INR: 0.99 ratio         PTT - ( 13 Apr 2018 07:55 )  PTT:35.2 sec        I&O's Summary    14 Apr 2018 07:01  -  15 Apr 2018 07:00  --------------------------------------------------------  IN: 1090 mL / OUT: 2950 mL / NET: -1860 mL        MICROBIOLOGY:    RADIOLOGY:  < from: MR Cervical Spine w/wo IV Cont (04.11.18 @ 21:03) >  IMPRESSION:  There is no cord compression along the cervical or thoracic spine. The   spinal cord demonstrates normal course and caliber.    There is abnormal enhancement involving the entire cauda equina extending   to the ventral and dorsal aspects of the lower thoracic cord. There is   abnormal epidural complex possibly multiloculated abscess versus phlegmon   centered at the left L4-L5 facet joint, possibly involving the L5-S1   facet.  Severe spinal canal stenosis with compression of all descending   enhancing nerves at L4-L5 isseen. There is abnormal enhancement of the   intervertebral disc bordering this process at L4-L5 and L5-S1.    The constellation of these findings is most consistent with left L4-L5,   possibly L5-S1 facetitis with resulting epidural phlegmon versus complex   abscess spanning L3-L5 and arachnoiditis involving the cauda equina. The   differential diagnosis of sarcoidosis versus lymphoma are not entirely   excluded; please correlate with patient history, and clinical and   laboratory exam findings.    These findings were discussed with Dr. MOLINA at 4/12/2018 9:45 AM   by Dr. Hodges.       < end of copied text >

## 2018-04-15 NOTE — PROGRESS NOTE ADULT - ASSESSMENT
62 year old with DJD, s/p trigger point injection, presented with back pain.    Imaging raises concern for discitis/ OM with epidural phlegmon. Also ? subarachnoiditis.    She is s/p an LP.  Her LP results are difficult to interpret.  Her first LP had veery low glucose and high protein without a pmn predominance.  CSF culture is without growth and her CSF pcr was negative. Antibiotics were not given prior to the LP.  Bacterial meningitis is not likely    The Lp findings and imaging do raise the concern for sarcoid/ lymphoma/ less likely Tb.  It is also feasible that an LP could be abnormal if there was an epidural bacterial process.     The repeat LP looks very different- further confounding this case.     Check HIV status (negative)/ Check ACE level/ check Quantiferon  Check CT chest to look for other evidence fo sarcoid.    I think that we need a true tissue diagnosis.     Would continue to observe off of antimicrobials            Pedro Luis Boudreaux MD  [phone]  After 5pm/weekends [phone]

## 2018-04-15 NOTE — PROGRESS NOTE ADULT - PROBLEM SELECTOR PLAN 3
- Patient with mood disturbance today but does not have any psychiatric history documented. Agitation present in ED.   - Psych c/s called, c/w Haldol 1mg q6H prn  - C/w 1 to 1 observation at this time  - UA shows no evidence of infection; f/u UCx  -F/u BCx- NGTD  - HIV neg, TSH WNL,   - F/u syph screen

## 2018-04-15 NOTE — PROGRESS NOTE ADULT - SUBJECTIVE AND OBJECTIVE BOX
Patient is a 62y old  Female who presents with a chief complaint of CC: "I need an emergent MRI" (10 Apr 2018 10:20)    Pt seen at bedside.  She was sleepy, reports did not sleep well last night.     PHYSICAL EXAM:  Refused exam as she was sleepy.      Vital Signs Last 24 Hrs  T(C): 37.1 (15 Apr 2018 04:23), Max: 37.1 (15 Apr 2018 04:23)  T(F): 98.8 (15 Apr 2018 04:23), Max: 98.8 (15 Apr 2018 04:23)  HR: 105 (15 Apr 2018 04:23) (87 - 105)  BP: 150/88 (15 Apr 2018 04:23) (131/75 - 150/88)  BP(mean): --  RR: 18 (15 Apr 2018 04:23) (18 - 18)  SpO2: 94% (15 Apr 2018 04:23) (94% - 99%)    MEDICATIONS  (STANDING):  dextrose 5%. 1000 milliLiter(s) (50 mL/Hr) IV Continuous <Continuous>  dextrose 50% Injectable 12.5 Gram(s) IV Push once  dextrose 50% Injectable 25 Gram(s) IV Push once  dextrose 50% Injectable 25 Gram(s) IV Push once  heparin  Injectable 5000 Unit(s) SubCutaneous every 8 hours  influenza   Vaccine 0.5 milliLiter(s) IntraMuscular once  insulin lispro (HumaLOG) corrective regimen sliding scale   SubCutaneous three times a day before meals  insulin lispro (HumaLOG) corrective regimen sliding scale   SubCutaneous at bedtime    MEDICATIONS  (PRN):  acetaminophen   Tablet. 650 milliGRAM(s) Oral every 6 hours PRN Mild Pain (1 - 3)  dextrose Gel 1 Dose(s) Oral once PRN Blood Glucose LESS THAN 70 milliGRAM(s)/deciliter  glucagon  Injectable 1 milliGRAM(s) IntraMuscular once PRN Glucose LESS THAN 70 milligrams/deciliter  haloperidol    Injectable 1 milliGRAM(s) IV Push every 6 hours PRN agitation  ketorolac   Injectable 15 milliGRAM(s) IV Push every 6 hours PRN Moderate Pain (4 - 6)  oxyCODONE    IR 5 milliGRAM(s) Oral every 6 hours PRN Severe Pain (7 - 10)      < from: CT Angio Head w/ IV Cont (04.14.18 @ 18:02) >  IMPRESSION:  No occlusion, significant stenosis or other vascular   abnormality identified.    Mucosal disease of the paranasal sinuses with a large septal perforation   and intranasal mucosal thickening raising the possibility of   granulomatous disease      < end of copied text >

## 2018-04-15 NOTE — PROGRESS NOTE ADULT - PROBLEM SELECTOR PLAN 1
- MR lumbar spine suspicious for phlegmon w/ arachnoiditis vs malignancy   - NSG consulted, recs appreciated: laminectomy w/ biopsy on monday.  - NPO after MN.  - Patient s/p LP x 2 by neurorads  - F/u CSF results - initial set showed low glucose, elevated protein, elevated LDH, inc nucleated cells.   - F/u cytology and flow cytometry  - ID onboard, they reviewed initial CSF and believe differential should include sarcoid, TB, lymphoma; Additionally, the repeat CSF studies are very different, with elevated glucose, which confounds case further  - Per ID, off abx  - CSF PCR neg, gram stain neg  - F/u serum ACE, quanti gold, CYNTHIA - in lab  - AFB neg  - CT C/A/P was ordered to r/o metastatic disease TB, sarcoid - negative  - HIV neg  - Elevated ESR and CRP noted; will continue to trend daily  - Neuro recs appreciated: hold steroids for now, start ASA 81qd for ?subacute infarct on MR brain, consider rheum eval (?sarcoid)  - F/u CTA head/neck to r/o vasculitis.   - BCx NGTD  - F/u UCx  - Pain control - c/w NSAIDs and Tylenol for tolerable pain, oxycodone for severe pain

## 2018-04-15 NOTE — PROGRESS NOTE ADULT - ASSESSMENT
62F with history of spinal stenosis s/p paraspinal injections p/w worsening back pain and elevated serum markers concerning for infection. MRI images reviewed which suggest lumbar spine epidural infection with slight paraspinal involvement. LP inconclusive x2. Neurosurgery called for possible biopsy as IR said no tragetable lesion.   -pre-op for OR tomorrow for L4-S1 laminectomy, open biopsy of abscess   - Discussed at length with patient and , consent in chart  - NPO at midnight   - Pre-op labs (CBC/BMP/T&S/coags)  - May benefit from anxiolytic, patient very nervous about waiting for surgery in the AM   - Please clearly document medical clearance / optimization for OR tomorrow. 62F with history of spinal stenosis s/p paraspinal injections p/w worsening back pain and elevated serum markers concerning for infection. MRI images reviewed which suggest lumbar spine epidural infection with slight paraspinal involvement. LP inconclusive x2. Neurosurgery called for possible biopsy as IR said no tragetable lesion.   -pre-op for OR tomorrow for L4-S1 laminectomy, open biopsy of abscess   - Discussed at length with patient and , consent in chart  - NPO at midnight   - Pre-op labs (CBC/BMP/T&S/coags)  - May benefit from anxiolytic, patient very nervous about waiting for surgery in the AM   - Please clearly document medical clearance / optimization for OR tomorrow.   - Please hold all AC (including DVT ppx), do not give Asa as patient is going to OR

## 2018-04-15 NOTE — PROGRESS NOTE ADULT - ATTENDING COMMENTS
Multiple falls with concern for plegmon vs. abscess   -MRI concerning for L5-S1 facetitis with resulting epidural phlegmon versus complex   abscess spanning L3-L5 and arachnoiditis involving the cauda equina  -appreciate ID recs, LP results equivocal, continue management per ID   -recommend rheum eval given inflammatory process   -Nsx recs noted, OR Monday   -pain control per primary team   -fall precaution   -PT.    Arachonitis involving the cauda equina per MRI   -would start prednisone 60mg and taper over 6 days however given concern for infection steroids contraindicated   -recommend I.D clearance prior to starting steroid     Subacute infarct   -MRI brain concerning also for possible infection-cerebritis   -CTA head and neck now with concern for septal perforation       recommend dedicated CT sinus and ENT eval   -rheum eval for evaluation of inflammatory process   -check HbA1C and LDL   -ASA 81mg daily

## 2018-04-15 NOTE — PROGRESS NOTE ADULT - ATTENDING COMMENTS
For laminectomy on Monday w/ neurosurg: RCRI: 0 points, class 1 risk. .4% of major cardiac risk. Per NSQIP calculator she's below average risk though with a 2.9% chance of serious complication, 3.3% chance of any complication, 1% chance of wound infection. Hold ASA prior to procedure. For laminectomy on Monday w/ neurosurg, no contraindication for procedure. As per note yesterday risk stratification as follows: "RCRI: 0 points, class 1 risk. .4% of major cardiac risk. Per NSQIP calculator she's below average risk though with a 2.9% chance of serious complication, 3.3% chance of any complication, 1% chance of wound infection." Hold ASA prior to procedure. Hold toradol for PRN pain to minimize bleed. Agree w/ neurology re: dedicated CT sinus and ENT eval. Agree re: ID clearance for use of steroids in this case, will discuss w/ neuro how long we could wait to initiate steroids following procedure if cleared so as to minimize interference w/ wound healing. U/S LE to r/o DVT. Await tissue biopsy.

## 2018-04-15 NOTE — PROGRESS NOTE ADULT - SUBJECTIVE AND OBJECTIVE BOX
Visit Summary: Patient seen and evaluated at bedside.    Overnight Events: none    Exam:  Vital Signs Last 24 Hrs  T(C): 37.1 (15 Apr 2018 12:10), Max: 37.1 (15 Apr 2018 04:23)  T(F): 98.8 (15 Apr 2018 12:10), Max: 98.8 (15 Apr 2018 04:23)  HR: 112 (15 Apr 2018 12:10) (87 - 112)  BP: 125/77 (15 Apr 2018 12:10) (125/77 - 150/88)  BP(mean): --  RR: 18 (15 Apr 2018 12:10) (18 - 18)  SpO2: 96% (15 Apr 2018 12:10) (94% - 99%)    PHYSICAL EXAM:    Constitutional: No Acute Distress     Neurological: AOx3, Following Commands    Motor exam:          Upper extremity                         Delt     Bicep     Tricep    HG                                                 R         5/5        5/5        5/5       5/5                                               L          5/5        5/5        5/5       5/5          Lower extremity                        HF         KF        KE       DF         PF                                                  R        5/5        5/5        5/5       3/5         5/5                                               L         5/5        5/5       5/5       3/5          5/5                                                 Sensation: [x] intact to light touch  [] decreased:     No clonus                            10.8   12.30 )-----------( 476      ( 15 Apr 2018 09:20 )             33.9   04-15    142  |  101  |  12  ----------------------------<  156<H>  4.4   |  25  |  0.55    Ca    10.1      15 Apr 2018 07:41  Phos  2.9     04-15  Mg     2.1     04-15

## 2018-04-16 ENCOUNTER — TRANSCRIPTION ENCOUNTER (OUTPATIENT)
Age: 63
End: 2018-04-16

## 2018-04-16 LAB
ACE SERPL-CCNC: 31 U/L — SIGNIFICANT CHANGE UP (ref 14–82)
ACE SERPL-CCNC: 32 U/L — SIGNIFICANT CHANGE UP (ref 14–82)
ANION GAP SERPL CALC-SCNC: 13 MMOL/L — SIGNIFICANT CHANGE UP (ref 5–17)
APTT BLD: 39.2 SEC — HIGH (ref 27.5–37.4)
BUN SERPL-MCNC: 15 MG/DL — SIGNIFICANT CHANGE UP (ref 7–23)
CALCIUM SERPL-MCNC: 10.3 MG/DL — SIGNIFICANT CHANGE UP (ref 8.4–10.5)
CHLORIDE SERPL-SCNC: 101 MMOL/L — SIGNIFICANT CHANGE UP (ref 96–108)
CO2 SERPL-SCNC: 25 MMOL/L — SIGNIFICANT CHANGE UP (ref 22–31)
CREAT SERPL-MCNC: 0.56 MG/DL — SIGNIFICANT CHANGE UP (ref 0.5–1.3)
CULTURE RESULTS: NO GROWTH — SIGNIFICANT CHANGE UP
GLUCOSE SERPL-MCNC: 154 MG/DL — HIGH (ref 70–99)
HCT VFR BLD CALC: 34.3 % — LOW (ref 34.5–45)
HGB BLD-MCNC: 11.1 G/DL — LOW (ref 11.5–15.5)
INR BLD: 0.98 RATIO — SIGNIFICANT CHANGE UP (ref 0.88–1.16)
M TB TUBERC IFN-G BLD QL: 0 IU/ML — SIGNIFICANT CHANGE UP
M TB TUBERC IFN-G BLD QL: 0.01 IU/ML — SIGNIFICANT CHANGE UP
M TB TUBERC IFN-G BLD QL: NEGATIVE — SIGNIFICANT CHANGE UP
MAGNESIUM SERPL-MCNC: 2.1 MG/DL — SIGNIFICANT CHANGE UP (ref 1.6–2.6)
MCHC RBC-ENTMCNC: 31.4 PG — SIGNIFICANT CHANGE UP (ref 27–34)
MCHC RBC-ENTMCNC: 32.4 GM/DL — SIGNIFICANT CHANGE UP (ref 32–36)
MCV RBC AUTO: 97.2 FL — SIGNIFICANT CHANGE UP (ref 80–100)
MITOGEN IGNF BCKGRD COR BLD-ACNC: 1.03 IU/ML — SIGNIFICANT CHANGE UP
PHOSPHATE SERPL-MCNC: 3.8 MG/DL — SIGNIFICANT CHANGE UP (ref 2.5–4.5)
PLATELET # BLD AUTO: 468 K/UL — HIGH (ref 150–400)
POTASSIUM SERPL-MCNC: 4.2 MMOL/L — SIGNIFICANT CHANGE UP (ref 3.5–5.3)
POTASSIUM SERPL-SCNC: 4.2 MMOL/L — SIGNIFICANT CHANGE UP (ref 3.5–5.3)
PROTHROM AB SERPL-ACNC: 11.1 SEC — SIGNIFICANT CHANGE UP (ref 10–13.1)
RBC # BLD: 3.53 M/UL — LOW (ref 3.8–5.2)
RBC # FLD: 13.5 % — SIGNIFICANT CHANGE UP (ref 10.3–14.5)
SODIUM SERPL-SCNC: 139 MMOL/L — SIGNIFICANT CHANGE UP (ref 135–145)
SPECIMEN SOURCE: SIGNIFICANT CHANGE UP
T PALLIDUM AB TITR SER: NEGATIVE — SIGNIFICANT CHANGE UP
WBC # BLD: 12.28 K/UL — HIGH (ref 3.8–10.5)
WBC # FLD AUTO: 12.28 K/UL — HIGH (ref 3.8–10.5)

## 2018-04-16 PROCEDURE — 99233 SBSQ HOSP IP/OBS HIGH 50: CPT | Mod: GC

## 2018-04-16 PROCEDURE — 99232 SBSQ HOSP IP/OBS MODERATE 35: CPT

## 2018-04-16 RX ORDER — DIPHENHYDRAMINE HCL 50 MG
25 CAPSULE ORAL EVERY 6 HOURS
Qty: 0 | Refills: 0 | Status: DISCONTINUED | OUTPATIENT
Start: 2018-04-16 | End: 2018-04-17

## 2018-04-16 RX ADMIN — OXYCODONE HYDROCHLORIDE 5 MILLIGRAM(S): 5 TABLET ORAL at 22:18

## 2018-04-16 RX ADMIN — OXYCODONE HYDROCHLORIDE 5 MILLIGRAM(S): 5 TABLET ORAL at 14:43

## 2018-04-16 RX ADMIN — OXYCODONE HYDROCHLORIDE 5 MILLIGRAM(S): 5 TABLET ORAL at 23:00

## 2018-04-16 RX ADMIN — OXYCODONE HYDROCHLORIDE 5 MILLIGRAM(S): 5 TABLET ORAL at 14:03

## 2018-04-16 RX ADMIN — OXYCODONE HYDROCHLORIDE 5 MILLIGRAM(S): 5 TABLET ORAL at 08:11

## 2018-04-16 RX ADMIN — Medication 25 MILLIGRAM(S): at 21:02

## 2018-04-16 RX ADMIN — OXYCODONE HYDROCHLORIDE 5 MILLIGRAM(S): 5 TABLET ORAL at 08:41

## 2018-04-16 RX ADMIN — Medication 3: at 13:01

## 2018-04-16 NOTE — PROGRESS NOTE ADULT - ASSESSMENT
62year old with DJD, s/p trigger point injection, presented with back pain.    Imaging raises concern for discitis/ OM with epidural phlegmon. Also ? subarachnoiditis.      The Lp findings and imaging do raise the concern for sarcoid/ lymphoma/ less likely Tb.  It is also feasible that an LP could be abnormal if there was an epidural bacterial process.       Plan is for surgery tomorrow- please send path as well as tissue (not swabs) for culture- routine, fungal, AFB.

## 2018-04-16 NOTE — PROGRESS NOTE ADULT - SUBJECTIVE AND OBJECTIVE BOX
Follow Up:      Inverval History/ROS:Patient is a 62y old  Female who presents withback pain.  Pain is unchanged.  No fever.          Allergies    No Known Allergies    Intolerances        ANTIMICROBIALS:      OTHER MEDS:  acetaminophen   Tablet. 650 milliGRAM(s) Oral every 6 hours PRN  dextrose 5%. 1000 milliLiter(s) IV Continuous <Continuous>  dextrose 50% Injectable 12.5 Gram(s) IV Push once  dextrose 50% Injectable 25 Gram(s) IV Push once  dextrose 50% Injectable 25 Gram(s) IV Push once  dextrose Gel 1 Dose(s) Oral once PRN  glucagon  Injectable 1 milliGRAM(s) IntraMuscular once PRN  haloperidol    Injectable 1 milliGRAM(s) IV Push every 6 hours PRN  influenza   Vaccine 0.5 milliLiter(s) IntraMuscular once  insulin lispro (HumaLOG) corrective regimen sliding scale   SubCutaneous three times a day before meals  insulin lispro (HumaLOG) corrective regimen sliding scale   SubCutaneous at bedtime  oxyCODONE    IR 5 milliGRAM(s) Oral every 6 hours PRN      Vital Signs Last 24 Hrs  T(C): 36.7 (16 Apr 2018 04:54), Max: 37.1 (15 Apr 2018 12:10)  T(F): 98.1 (16 Apr 2018 04:54), Max: 98.8 (15 Apr 2018 12:10)  HR: 100 (16 Apr 2018 04:54) (100 - 112)  BP: 138/76 (16 Apr 2018 04:54) (125/77 - 148/85)  BP(mean): --  RR: 18 (16 Apr 2018 04:54) (18 - 18)  SpO2: 96% (16 Apr 2018 04:54) (96% - 96%)    PHYSICAL EXAM:  General: [x ] non-toxic  HEAD/EYES: [ ] PERRL [ x] white sclera [ ] icterus  ENT:  [ ] normal [ x] supple [ ] thrush [ ] pharyngeal exudate  Cardiovascular:   [ ] murmur [x ] normal [ ] PPM/AICD  Respiratory:  [x ] clear to ausculation bilaterally  GI:  [x ] soft, non-tender, normal bowel sounds  :  [ ] ryan [ ] no CVA tenderness   Musculoskeletal:  [ ] no synovitis  Neurologic:  [x ] non-focal exam   Skin:  [x ] no rash  Lymph: [x ] no lymphadenopathy  Psychiatric:  [x ] appropriate affect [ ] alert & oriented  Lines:  [x ] no phlebitis [ ] central line                                11.1   12.28 )-----------( 468      ( 16 Apr 2018 09:26 )             34.3       04-16    139  |  101  |  15  ----------------------------<  154<H>  4.2   |  25  |  0.56    Ca    10.3      16 Apr 2018 06:56  Phos  3.8     04-16  Mg     2.1     04-16            MICROBIOLOGY:Culture Results:   No growth (04-13-18 @ 15:22)  Culture Results:   No growth (04-12-18 @ 17:41)  Culture Results:   Testing in progress (04-12-18 @ 17:41)  Culture Results:   <10,000 CFU/ml  Normal Urogenital ami present (04-10-18 @ 19:04)  Culture Results:   No growth at 5 days. (04-10-18 @ 18:55)  Culture Results:   No growth at 5 days. (04-10-18 @ 18:55)      RADIOLOGY:

## 2018-04-16 NOTE — PROGRESS NOTE ADULT - SUBJECTIVE AND OBJECTIVE BOX
CONTACT INFO:  Aime Muñoz MD  PGY-1 | Internal Medicine  Spectra: [phone]    Patient is a 62y old  Female who presents with a chief complaint of CC: "I need an emergent MRI" (10 Apr 2018 10:20)      SUBJECTIVE / OVERNIGHT EVENTS: No acute overnight events. Patient scheduled for lumbar laminectomy w/ bx tomorrow. Patient has no new complaints.   On tele:    REVIEW OF SYSTEMS:  14-point ROS was conducted with the patient and is negative except for those listed above.      MEDICATIONS  (STANDING):  dextrose 5%. 1000 milliLiter(s) (50 mL/Hr) IV Continuous <Continuous>  dextrose 50% Injectable 12.5 Gram(s) IV Push once  dextrose 50% Injectable 25 Gram(s) IV Push once  dextrose 50% Injectable 25 Gram(s) IV Push once  influenza   Vaccine 0.5 milliLiter(s) IntraMuscular once  insulin lispro (HumaLOG) corrective regimen sliding scale   SubCutaneous three times a day before meals  insulin lispro (HumaLOG) corrective regimen sliding scale   SubCutaneous at bedtime    MEDICATIONS  (PRN):  acetaminophen   Tablet. 650 milliGRAM(s) Oral every 6 hours PRN Mild Pain (1 - 3)  dextrose Gel 1 Dose(s) Oral once PRN Blood Glucose LESS THAN 70 milliGRAM(s)/deciliter  glucagon  Injectable 1 milliGRAM(s) IntraMuscular once PRN Glucose LESS THAN 70 milligrams/deciliter  haloperidol    Injectable 1 milliGRAM(s) IV Push every 6 hours PRN agitation  oxyCODONE    IR 5 milliGRAM(s) Oral every 6 hours PRN Severe Pain (7 - 10)      T(C): 36.7 (04-16-18 @ 04:54), Max: 37.1 (04-15-18 @ 12:10)  HR: 100 (04-16-18 @ 04:54) (100 - 112)  BP: 138/76 (04-16-18 @ 04:54) (125/77 - 148/85)  RR: 18 (04-16-18 @ 04:54) (18 - 18)  SpO2: 96% (04-16-18 @ 04:54) (96% - 96%)    PHYSICAL EXAM:  GENERAL: Sitting in chair, sleepy.  HEENT: Head NC/AT  NECK: No deformity, supple  CHEST/LUNG: CTABL, no wheezes  CARDIOVASCULAR: RRR, no murmur/rubs/gallops. No LE edema.  ABDOMEN: Soft, NT  BACK: point tenderness lumbar spinous processes  EXTREMITIES: No deformities. FROM x all 4 extremities.  NEURO: A/O x 3, tired this AM, otherwise non-focal neuro exam  PSYCH: Tired      LABS:                        10.8   12.30 )-----------( 476      ( 15 Apr 2018 09:20 )             33.9     04-16    139  |  101  |  15  ----------------------------<  154<H>  4.2   |  25  |  0.56    Ca    10.3      16 Apr 2018 06:56  Phos  3.8     04-16  Mg     2.1     04-16      PT/INR - ( 16 Apr 2018 07:40 )   PT: 11.1 sec;   INR: 0.98 ratio         PTT - ( 16 Apr 2018 07:40 )  PTT:39.2 sec        I&O's Summary    15 Apr 2018 07:01  -  16 Apr 2018 07:00  --------------------------------------------------------  IN: 400 mL / OUT: 0 mL / NET: 400 mL        MICROBIOLOGY:    RADIOLOGY: CONTACT INFO:  Aime Muñoz MD  PGY-1 | Internal Medicine  Spectra: [phone]    Patient is a 62y old  Female who presents with a chief complaint of CC: "I need an emergent MRI" (10 Apr 2018 10:20)      SUBJECTIVE / OVERNIGHT EVENTS: No acute overnight events. Patient scheduled for lumbar laminectomy w/ bx tomorrow. Patient has no new complaints. She says her back pain is resolved.  On tele:    REVIEW OF SYSTEMS:  14-point ROS was conducted with the patient and is negative except for those listed above.      MEDICATIONS  (STANDING):  dextrose 5%. 1000 milliLiter(s) (50 mL/Hr) IV Continuous <Continuous>  dextrose 50% Injectable 12.5 Gram(s) IV Push once  dextrose 50% Injectable 25 Gram(s) IV Push once  dextrose 50% Injectable 25 Gram(s) IV Push once  influenza   Vaccine 0.5 milliLiter(s) IntraMuscular once  insulin lispro (HumaLOG) corrective regimen sliding scale   SubCutaneous three times a day before meals  insulin lispro (HumaLOG) corrective regimen sliding scale   SubCutaneous at bedtime    MEDICATIONS  (PRN):  acetaminophen   Tablet. 650 milliGRAM(s) Oral every 6 hours PRN Mild Pain (1 - 3)  dextrose Gel 1 Dose(s) Oral once PRN Blood Glucose LESS THAN 70 milliGRAM(s)/deciliter  glucagon  Injectable 1 milliGRAM(s) IntraMuscular once PRN Glucose LESS THAN 70 milligrams/deciliter  haloperidol    Injectable 1 milliGRAM(s) IV Push every 6 hours PRN agitation  oxyCODONE    IR 5 milliGRAM(s) Oral every 6 hours PRN Severe Pain (7 - 10)      T(C): 36.7 (04-16-18 @ 04:54), Max: 37.1 (04-15-18 @ 12:10)  HR: 100 (04-16-18 @ 04:54) (100 - 112)  BP: 138/76 (04-16-18 @ 04:54) (125/77 - 148/85)  RR: 18 (04-16-18 @ 04:54) (18 - 18)  SpO2: 96% (04-16-18 @ 04:54) (96% - 96%)    PHYSICAL EXAM:  GENERAL: Sitting in chair, eating breakfast.  HEENT: Head NC/AT  NECK: No deformity, supple  CHEST/LUNG: CTABL, no wheezes  CARDIOVASCULAR: RRR, no murmur/rubs/gallops. No LE edema.  ABDOMEN: Soft, NT  BACK: point tenderness lumbar spinous processes  EXTREMITIES: No deformities. FROM x all 4 extremities.  NEURO: A/O x 3, alert this AM, otherwise non-focal neuro exam  PSYCH: Tired      LABS:                        10.8   12.30 )-----------( 476      ( 15 Apr 2018 09:20 )             33.9     04-16    139  |  101  |  15  ----------------------------<  154<H>  4.2   |  25  |  0.56    Ca    10.3      16 Apr 2018 06:56  Phos  3.8     04-16  Mg     2.1     04-16      PT/INR - ( 16 Apr 2018 07:40 )   PT: 11.1 sec;   INR: 0.98 ratio         PTT - ( 16 Apr 2018 07:40 )  PTT:39.2 sec        I&O's Summary    15 Apr 2018 07:01  -  16 Apr 2018 07:00  --------------------------------------------------------  IN: 400 mL / OUT: 0 mL / NET: 400 mL        MICROBIOLOGY:    RADIOLOGY:

## 2018-04-16 NOTE — DIETITIAN INITIAL EVALUATION ADULT. - ENERGY NEEDS
patient admitted with intractable back pain; emotional lability.  NPO overnight for Laminectomy with biopsy today  IBW=  120  lbs  +/- 10% used for protein estimation

## 2018-04-16 NOTE — PROGRESS NOTE ADULT - PROBLEM SELECTOR PLAN 4
- Psych following, appreciate recs  - Patient with small focus on MR brain which could represent a small subacute infarct with hemorrhagic transformation, a focus   of infection-cerebritis, a small mass, or atypical demyelination.  - TTE neg for thrombus  - HbA1c 7.5; start SSI  - lipid panel WNL  - Was recommended to start ASA 81 qd per neuro recs - if this represents a subacute infarct. However, will hold as patient will receive surgery and consider resuming post-op.

## 2018-04-16 NOTE — PROGRESS NOTE ADULT - ASSESSMENT
62F with history of spinal stenosis s/p paraspinal injections p/w worsening back pain and elevated serum markers concerning for infection. MRI images reviewed which suggest lumbar spine epidural infection with slight paraspinal involvement. LP inconclusive x2. Neurosurgery called for possible biopsy as IR said no tragetable lesion. She does have b/l DF weakness which she states has been going on for a while (>48 hours), reports it is intermittent and related to leg swelling. Explained that it may be from this collection. She understands that she needs surgery.   -pre-op for OR today for L4-S1 laminectomy, open biopsy of abscess   -Consent in chart  - Keep NPO   - May benefit from anxiolytic, patient very nervous about waiting for surgery  - Please clearly document medical clearance / optimization for OR  - Please hold all AC (including DVT ppx), do not give Asa as patient is going to OR 62F with history of spinal stenosis s/p paraspinal injections p/w worsening back pain and elevated serum markers concerning for infection. MRI images reviewed which suggest lumbar spine epidural infection with slight paraspinal involvement. LP inconclusive x2. Neurosurgery called for possible biopsy as IR said no tragetable lesion. She does have b/l DF weakness which she states has been going on for a while (>48 hours), reports it is intermittent and related to leg swelling. Explained that it may be from this collection. She understands that she needs surgery. Due to scheduling issues, OR postponed until tomorrow 4/17.   -pre-op for OR TOMORROW for L4-S1 laminectomy, open biopsy of abscess   -Consent in chart  - NPO after MN  - May benefit from anxiolytic, patient very nervous about waiting for surgery  - Please clearly document medical clearance / optimization for OR  - Please hold all AC (including DVT ppx), do not give Asa as patient is going to OR

## 2018-04-16 NOTE — DIETITIAN INITIAL EVALUATION ADULT. - PROBLEM SELECTOR PLAN 4
#DVT PPx: HSQ  #Diet: NPO pending MRI w/ sedation  #PT consult called    Aime Muñoz MD  PGY-1 | Preliminary Resident  Department of Internal Medicine  [phone] ([facility]) | [id number] ([place])

## 2018-04-16 NOTE — PROGRESS NOTE ADULT - SUBJECTIVE AND OBJECTIVE BOX
Visit Summary: Patient seen and evaluated at bedside.    Overnight Events: none    Exam:  Vital Signs Last 24 Hrs  T(C): 36.7 (16 Apr 2018 04:54), Max: 37.1 (15 Apr 2018 12:10)  T(F): 98.1 (16 Apr 2018 04:54), Max: 98.8 (15 Apr 2018 12:10)  HR: 100 (16 Apr 2018 04:54) (100 - 112)  BP: 138/76 (16 Apr 2018 04:54) (125/77 - 148/85)  BP(mean): --  RR: 18 (16 Apr 2018 04:54) (18 - 18)  SpO2: 96% (16 Apr 2018 04:54) (96% - 96%)    PHYSICAL EXAM:    Constitutional: No Acute Distress     Neurological: AOx3, Following Commands    Motor exam:          Upper extremity                         Delt     Bicep     Tricep    HG                                                 R         5/5        5/5        5/5       5/5                                               L          5/5        5/5        5/5       5/5          Lower extremity                        HF         KF        KE       DF         PF                                                  R        5/5        5/5        5/5       2/5         5/5                                               L         5/5        5/5       5/5       2/5          5/5                                                 Sensation: [x] intact to light touch  [] decreased:     No clonus                                       10.8   12.30 )-----------( 476      ( 15 Apr 2018 09:20 )             33.9   04-16    139  |  101  |  15  ----------------------------<  154<H>  4.2   |  25  |  0.56    Ca    10.3      16 Apr 2018 06:56  Phos  3.8     04-16  Mg     2.1     04-16    PT/INR - ( 16 Apr 2018 07:40 )   PT: 11.1 sec;   INR: 0.98 ratio         PTT - ( 16 Apr 2018 07:40 )  PTT:39.2 sec

## 2018-04-16 NOTE — PROGRESS NOTE ADULT - ATTENDING COMMENTS
Patient pre-op for laminectomy and biopsy of lumbar spine epidural collection  Patient with RCRI of 0-- class I risk, 0.4% risk of major cardiac event.  Difficult to assess METs given recent LE weakness/pain  pt is low to intermediate risk for intermediate risk procedure  There is no medical contrindication to planned procedure, and no further testing recommended pre-operatively  hold aspirin and dvt ppx  no insulin morning of procedure  npo past midnight  following procedure, need to discuss initiation of steroids with neurology and ID. Still remains unclear if this is infectious vs inflammatory vs malignant in etiology.

## 2018-04-16 NOTE — DIETITIAN INITIAL EVALUATION ADULT. - OTHER INFO
patient visited in room, eating well, no complaints. Pt noted with elevated HgbA1c, denies DM but states it is from her steroid medication. Pt has missing teeth in front but denies chewing or swallowing difficulty. Pt takes MVI at home but does not wish to receive them in the hospital. pt also states he back pain is improving

## 2018-04-16 NOTE — PROVIDER CONTACT NOTE (OTHER) - BACKGROUND
pt with am fingerstick glucose reading of 165, pt refusing insulin coverage insulin of one unit as per sliding  scale

## 2018-04-16 NOTE — PROGRESS NOTE ADULT - PROBLEM SELECTOR PLAN 1
- MR lumbar spine suspicious for phlegmon w/ arachnoiditis vs malignancy    - laminectomy w/ biopsy tomorrow. NPO after MN, holding ASA and DVT PPx  - Patient s/p LP x 2 by neurorads  - F/u CSF results - initial set showed low glucose, elevated protein, elevated LDH, inc nucleated cells.   - F/u cytology and flow cytometry  - ID onboard, they reviewed initial CSF and believe differential should include sarcoid, TB, lymphoma; Additionally, the repeat CSF studies are very different, with elevated glucose, which confounds case further  - Per ID, off abx  - CSF PCR neg, gram stain neg  - F/u serum ACE,   - quanti gold, CYNTHIA are NEG  - AFB neg  - CT C/A/P was ordered to r/o metastatic disease TB, sarcoid - negative  - HIV neg  - Elevated ESR and CRP noted; will continue to trend daily  - Neuro recs appreciated: consider rheum eval (?sarcoid)  - F/u CTA head/neck shows no vasculitis, but significant for mucosal disease of the paranasal sinuses with a large septal perforation and intranasal mucosal thickening raising the possibility of granulomatous disease.  - F/u CT sinuses per neuro recs  - BCx NGTD  - F/u UCx  - Pain control - c/w NSAIDs and Tylenol for tolerable pain, oxycodone for severe

## 2018-04-16 NOTE — DIETITIAN INITIAL EVALUATION ADULT. - PROBLEM SELECTOR PLAN 3
- Patient with obvious mood disturbance today but does not have any psychiatric history documented. Agitation present in ED.   - Psych c/s called, f/u recs  -1 to 1 observation at this time  -haldol prn, monitor QTc  -check UA; monitor for signs/symptoms of infection

## 2018-04-17 ENCOUNTER — APPOINTMENT (OUTPATIENT)
Dept: NEUROSURGERY | Facility: HOSPITAL | Age: 63
End: 2018-04-17

## 2018-04-17 ENCOUNTER — RESULT REVIEW (OUTPATIENT)
Age: 63
End: 2018-04-17

## 2018-04-17 DIAGNOSIS — I63.9 CEREBRAL INFARCTION, UNSPECIFIED: ICD-10-CM

## 2018-04-17 DIAGNOSIS — G03.9 MENINGITIS, UNSPECIFIED: ICD-10-CM

## 2018-04-17 DIAGNOSIS — Q21.9 CONGENITAL MALFORMATION OF CARDIAC SEPTUM, UNSPECIFIED: ICD-10-CM

## 2018-04-17 LAB
ANION GAP SERPL CALC-SCNC: 16 MMOL/L — SIGNIFICANT CHANGE UP (ref 5–17)
APTT BLD: 36.1 SEC — SIGNIFICANT CHANGE UP (ref 27.5–37.4)
BUN SERPL-MCNC: 22 MG/DL — SIGNIFICANT CHANGE UP (ref 7–23)
CALCIUM SERPL-MCNC: 9.8 MG/DL — SIGNIFICANT CHANGE UP (ref 8.4–10.5)
CHLORIDE SERPL-SCNC: 98 MMOL/L — SIGNIFICANT CHANGE UP (ref 96–108)
CO2 SERPL-SCNC: 24 MMOL/L — SIGNIFICANT CHANGE UP (ref 22–31)
CREAT SERPL-MCNC: 0.66 MG/DL — SIGNIFICANT CHANGE UP (ref 0.5–1.3)
GLUCOSE SERPL-MCNC: 158 MG/DL — HIGH (ref 70–99)
HCT VFR BLD CALC: 30.8 % — LOW (ref 34.5–45)
HGB BLD-MCNC: 10.4 G/DL — LOW (ref 11.5–15.5)
INR BLD: 0.93 RATIO — SIGNIFICANT CHANGE UP (ref 0.88–1.16)
M TB TUBERC IFN-G BLD QL: 0 IU/ML — SIGNIFICANT CHANGE UP
M TB TUBERC IFN-G BLD QL: 0.01 IU/ML — SIGNIFICANT CHANGE UP
M TB TUBERC IFN-G BLD QL: NEGATIVE — SIGNIFICANT CHANGE UP
MAGNESIUM SERPL-MCNC: 2.2 MG/DL — SIGNIFICANT CHANGE UP (ref 1.6–2.6)
MCHC RBC-ENTMCNC: 31.5 PG — SIGNIFICANT CHANGE UP (ref 27–34)
MCHC RBC-ENTMCNC: 33.8 GM/DL — SIGNIFICANT CHANGE UP (ref 32–36)
MCV RBC AUTO: 93.3 FL — SIGNIFICANT CHANGE UP (ref 80–100)
MITOGEN IGNF BCKGRD COR BLD-ACNC: 1.56 IU/ML — SIGNIFICANT CHANGE UP
PHOSPHATE SERPL-MCNC: 3.9 MG/DL — SIGNIFICANT CHANGE UP (ref 2.5–4.5)
PLATELET # BLD AUTO: 425 K/UL — HIGH (ref 150–400)
POTASSIUM SERPL-MCNC: 4.3 MMOL/L — SIGNIFICANT CHANGE UP (ref 3.5–5.3)
POTASSIUM SERPL-SCNC: 4.3 MMOL/L — SIGNIFICANT CHANGE UP (ref 3.5–5.3)
PROTHROM AB SERPL-ACNC: 10.5 SEC — SIGNIFICANT CHANGE UP (ref 10–13.1)
RBC # BLD: 3.3 M/UL — LOW (ref 3.8–5.2)
RBC # FLD: 13.5 % — SIGNIFICANT CHANGE UP (ref 10.3–14.5)
SODIUM SERPL-SCNC: 138 MMOL/L — SIGNIFICANT CHANGE UP (ref 135–145)
TM INTERPRETATION: SIGNIFICANT CHANGE UP
VDRL CSF-TITR: NEGATIVE — SIGNIFICANT CHANGE UP
WBC # BLD: 11.51 K/UL — HIGH (ref 3.8–10.5)
WBC # FLD AUTO: 11.51 K/UL — HIGH (ref 3.8–10.5)

## 2018-04-17 PROCEDURE — 88307 TISSUE EXAM BY PATHOLOGIST: CPT | Mod: 26

## 2018-04-17 PROCEDURE — 63267 EXCISE INTRSPINL LESION LMBR: CPT

## 2018-04-17 PROCEDURE — 99233 SBSQ HOSP IP/OBS HIGH 50: CPT | Mod: GC

## 2018-04-17 PROCEDURE — 88341 IMHCHEM/IMCYTCHM EA ADD ANTB: CPT | Mod: 26

## 2018-04-17 PROCEDURE — 88365 INSITU HYBRIDIZATION (FISH): CPT | Mod: 26,59

## 2018-04-17 PROCEDURE — 88364 INSITU HYBRIDIZATION (FISH): CPT | Mod: 26

## 2018-04-17 PROCEDURE — 99223 1ST HOSP IP/OBS HIGH 75: CPT | Mod: 57

## 2018-04-17 PROCEDURE — 88342 IMHCHEM/IMCYTCHM 1ST ANTB: CPT | Mod: 26

## 2018-04-17 PROCEDURE — 99222 1ST HOSP IP/OBS MODERATE 55: CPT | Mod: GC

## 2018-04-17 PROCEDURE — 88312 SPECIAL STAINS GROUP 1: CPT | Mod: 26

## 2018-04-17 PROCEDURE — 99232 SBSQ HOSP IP/OBS MODERATE 35: CPT

## 2018-04-17 RX ORDER — SENNA PLUS 8.6 MG/1
2 TABLET ORAL AT BEDTIME
Qty: 0 | Refills: 0 | Status: DISCONTINUED | OUTPATIENT
Start: 2018-04-17 | End: 2018-04-20

## 2018-04-17 RX ORDER — MORPHINE SULFATE 50 MG/1
1 CAPSULE, EXTENDED RELEASE ORAL ONCE
Qty: 0 | Refills: 0 | Status: DISCONTINUED | OUTPATIENT
Start: 2018-04-17 | End: 2018-04-17

## 2018-04-17 RX ORDER — GLUCAGON INJECTION, SOLUTION 0.5 MG/.1ML
1 INJECTION, SOLUTION SUBCUTANEOUS ONCE
Qty: 0 | Refills: 0 | Status: DISCONTINUED | OUTPATIENT
Start: 2018-04-17 | End: 2018-04-20

## 2018-04-17 RX ORDER — CEFAZOLIN SODIUM 1 G
1000 VIAL (EA) INJECTION EVERY 8 HOURS
Qty: 0 | Refills: 0 | Status: COMPLETED | OUTPATIENT
Start: 2018-04-17 | End: 2018-04-18

## 2018-04-17 RX ORDER — ONDANSETRON 8 MG/1
4 TABLET, FILM COATED ORAL EVERY 6 HOURS
Qty: 0 | Refills: 0 | Status: DISCONTINUED | OUTPATIENT
Start: 2018-04-17 | End: 2018-04-20

## 2018-04-17 RX ORDER — HALOPERIDOL DECANOATE 100 MG/ML
1 INJECTION INTRAMUSCULAR EVERY 6 HOURS
Qty: 0 | Refills: 0 | Status: DISCONTINUED | OUTPATIENT
Start: 2018-04-17 | End: 2018-04-20

## 2018-04-17 RX ORDER — DEXTROSE 50 % IN WATER 50 %
1 SYRINGE (ML) INTRAVENOUS ONCE
Qty: 0 | Refills: 0 | Status: DISCONTINUED | OUTPATIENT
Start: 2018-04-17 | End: 2018-04-20

## 2018-04-17 RX ORDER — DEXTROSE 50 % IN WATER 50 %
25 SYRINGE (ML) INTRAVENOUS ONCE
Qty: 0 | Refills: 0 | Status: DISCONTINUED | OUTPATIENT
Start: 2018-04-17 | End: 2018-04-20

## 2018-04-17 RX ORDER — ACETAMINOPHEN 500 MG
650 TABLET ORAL EVERY 6 HOURS
Qty: 0 | Refills: 0 | Status: DISCONTINUED | OUTPATIENT
Start: 2018-04-17 | End: 2018-04-20

## 2018-04-17 RX ORDER — HEPARIN SODIUM 5000 [USP'U]/ML
5000 INJECTION INTRAVENOUS; SUBCUTANEOUS EVERY 8 HOURS
Qty: 0 | Refills: 0 | Status: DISCONTINUED | OUTPATIENT
Start: 2018-04-17 | End: 2018-04-20

## 2018-04-17 RX ORDER — DEXTROSE MONOHYDRATE, SODIUM CHLORIDE, AND POTASSIUM CHLORIDE 50; .745; 4.5 G/1000ML; G/1000ML; G/1000ML
1000 INJECTION, SOLUTION INTRAVENOUS
Qty: 0 | Refills: 0 | Status: DISCONTINUED | OUTPATIENT
Start: 2018-04-17 | End: 2018-04-20

## 2018-04-17 RX ORDER — PANTOPRAZOLE SODIUM 20 MG/1
40 TABLET, DELAYED RELEASE ORAL DAILY
Qty: 0 | Refills: 0 | Status: DISCONTINUED | OUTPATIENT
Start: 2018-04-17 | End: 2018-04-20

## 2018-04-17 RX ORDER — OXYCODONE AND ACETAMINOPHEN 5; 325 MG/1; MG/1
2 TABLET ORAL EVERY 6 HOURS
Qty: 0 | Refills: 0 | Status: DISCONTINUED | OUTPATIENT
Start: 2018-04-17 | End: 2018-04-18

## 2018-04-17 RX ORDER — DEXTROSE 50 % IN WATER 50 %
12.5 SYRINGE (ML) INTRAVENOUS ONCE
Qty: 0 | Refills: 0 | Status: DISCONTINUED | OUTPATIENT
Start: 2018-04-17 | End: 2018-04-20

## 2018-04-17 RX ORDER — ACETAMINOPHEN 500 MG
975 TABLET ORAL EVERY 6 HOURS
Qty: 0 | Refills: 0 | Status: DISCONTINUED | OUTPATIENT
Start: 2018-04-17 | End: 2018-04-18

## 2018-04-17 RX ORDER — DOCUSATE SODIUM 100 MG
100 CAPSULE ORAL THREE TIMES A DAY
Qty: 0 | Refills: 0 | Status: DISCONTINUED | OUTPATIENT
Start: 2018-04-17 | End: 2018-04-20

## 2018-04-17 RX ORDER — ACETAMINOPHEN 500 MG
650 TABLET ORAL EVERY 6 HOURS
Qty: 0 | Refills: 0 | Status: DISCONTINUED | OUTPATIENT
Start: 2018-04-17 | End: 2018-04-17

## 2018-04-17 RX ORDER — SODIUM CHLORIDE 9 MG/ML
1000 INJECTION, SOLUTION INTRAVENOUS
Qty: 0 | Refills: 0 | Status: DISCONTINUED | OUTPATIENT
Start: 2018-04-17 | End: 2018-04-20

## 2018-04-17 RX ORDER — INSULIN LISPRO 100/ML
VIAL (ML) SUBCUTANEOUS
Qty: 0 | Refills: 0 | Status: DISCONTINUED | OUTPATIENT
Start: 2018-04-17 | End: 2018-04-20

## 2018-04-17 RX ORDER — HYDROMORPHONE HYDROCHLORIDE 2 MG/ML
0.25 INJECTION INTRAMUSCULAR; INTRAVENOUS; SUBCUTANEOUS
Qty: 0 | Refills: 0 | Status: DISCONTINUED | OUTPATIENT
Start: 2018-04-17 | End: 2018-04-17

## 2018-04-17 RX ORDER — ONDANSETRON 8 MG/1
4 TABLET, FILM COATED ORAL ONCE
Qty: 0 | Refills: 0 | Status: DISCONTINUED | OUTPATIENT
Start: 2018-04-17 | End: 2018-04-17

## 2018-04-17 RX ORDER — MORPHINE SULFATE 50 MG/1
2 CAPSULE, EXTENDED RELEASE ORAL EVERY 6 HOURS
Qty: 0 | Refills: 0 | Status: DISCONTINUED | OUTPATIENT
Start: 2018-04-17 | End: 2018-04-17

## 2018-04-17 RX ORDER — ASPIRIN/CALCIUM CARB/MAGNESIUM 324 MG
81 TABLET ORAL DAILY
Qty: 0 | Refills: 0 | Status: DISCONTINUED | OUTPATIENT
Start: 2018-04-17 | End: 2018-04-20

## 2018-04-17 RX ORDER — OXYCODONE AND ACETAMINOPHEN 5; 325 MG/1; MG/1
1 TABLET ORAL EVERY 6 HOURS
Qty: 0 | Refills: 0 | Status: DISCONTINUED | OUTPATIENT
Start: 2018-04-17 | End: 2018-04-18

## 2018-04-17 RX ADMIN — HEPARIN SODIUM 5000 UNIT(S): 5000 INJECTION INTRAVENOUS; SUBCUTANEOUS at 23:37

## 2018-04-17 RX ADMIN — HYDROMORPHONE HYDROCHLORIDE 0.25 MILLIGRAM(S): 2 INJECTION INTRAMUSCULAR; INTRAVENOUS; SUBCUTANEOUS at 12:46

## 2018-04-17 RX ADMIN — Medication 1: at 19:56

## 2018-04-17 RX ADMIN — MORPHINE SULFATE 2 MILLIGRAM(S): 50 CAPSULE, EXTENDED RELEASE ORAL at 05:11

## 2018-04-17 RX ADMIN — HYDROMORPHONE HYDROCHLORIDE 0.25 MILLIGRAM(S): 2 INJECTION INTRAMUSCULAR; INTRAVENOUS; SUBCUTANEOUS at 13:00

## 2018-04-17 RX ADMIN — DEXTROSE MONOHYDRATE, SODIUM CHLORIDE, AND POTASSIUM CHLORIDE 75 MILLILITER(S): 50; .745; 4.5 INJECTION, SOLUTION INTRAVENOUS at 12:28

## 2018-04-17 RX ADMIN — Medication 650 MILLIGRAM(S): at 01:46

## 2018-04-17 RX ADMIN — Medication 650 MILLIGRAM(S): at 01:16

## 2018-04-17 RX ADMIN — OXYCODONE AND ACETAMINOPHEN 2 TABLET(S): 5; 325 TABLET ORAL at 19:45

## 2018-04-17 RX ADMIN — HALOPERIDOL DECANOATE 1 MILLIGRAM(S): 100 INJECTION INTRAMUSCULAR at 15:11

## 2018-04-17 RX ADMIN — MORPHINE SULFATE 2 MILLIGRAM(S): 50 CAPSULE, EXTENDED RELEASE ORAL at 05:30

## 2018-04-17 RX ADMIN — Medication 100 MILLIGRAM(S): at 19:40

## 2018-04-17 NOTE — CONSULT NOTE ADULT - SUBJECTIVE AND OBJECTIVE BOX
HPI:  Patient seen and examined at the bedside. She was recently medicated with antipsychotics in the ED and was lethargic, refusing to speak with examiners. HPI below is based on ED provider's history.    "63 y/o F presenting with 2 falls in the past few months from the ice. Requesting an MRI that has been ordered by Neurology to evaluate worsening of her cervical and lumbar pathology (disc bulge) since the falls. Went to get MRI today and she couldn't lie still from too much pain and was told to the hospital to be "sedated" for MRI. Complaining of lumbar spine pain worse than cervical. Patient also complaining knee pain bilaterally, ankle and shoulder all chronic. No fevers, no episodes of incontinence over the past few months (did one time not make it to the bathroom in time). Patient received paraspinal injection with Dr. Baumann in office 3 weeks ago. No history of IV drug use."     In the ED, patient was slated to be discharged but later became emotionally disturbed, violent, and was crying. She was put on 1:1 and given Haldol. ED team contacted telepsychiatry but they stated they do not see inpatient admits. ED was advised to admit to medicine team for inpatient psych consult. Patient's vitals were WNL other than for borderline tachycardia. Labs generally unremarkable other than for leukocytosis of 15 and K of 3.3. CT scan was ordered in the ED to r/o fx. No vertebral fractures were noted and was otherwise remarkable for:   1. No lumbar spine fracture or traumatic spondylolisthesis.  2. Multilevel degenerative changes of the lumbar spine worst at L4-L5   resulting in moderate spinal canal stenosis and moderate left and mild   right neural foraminal stenosis and at L5-S1 resulting in moderate   bilateral neural foraminal stenosis.      Patient was later seen by medicine team, at which point she was awake but appeared confused. She said the "chair" she was sitting on (while on the hospital stretcher) was causing her pain. She says she "cannot walk" and says "I am in pain." She also said she wanted to leave the ED. (10 Apr 2018 10:20)      Allergies    No Known Allergies    Intolerances        MEDICATIONS  (STANDING):  aspirin  chewable 81 milliGRAM(s) Oral daily  ceFAZolin   IVPB 1000 milliGRAM(s) IV Intermittent every 8 hours  dextrose 5%. 1000 milliLiter(s) (50 mL/Hr) IV Continuous <Continuous>  dextrose 50% Injectable 12.5 Gram(s) IV Push once  dextrose 50% Injectable 25 Gram(s) IV Push once  dextrose 50% Injectable 25 Gram(s) IV Push once  docusate sodium 100 milliGRAM(s) Oral three times a day  heparin  Injectable 5000 Unit(s) SubCutaneous every 8 hours  influenza   Vaccine 0.5 milliLiter(s) IntraMuscular once  insulin lispro (HumaLOG) corrective regimen sliding scale   SubCutaneous three times a day before meals  pantoprazole    Tablet 40 milliGRAM(s) Oral daily  sodium chloride 0.9% with potassium chloride 20 mEq/L 1000 milliLiter(s) (75 mL/Hr) IV Continuous <Continuous>    MEDICATIONS  (PRN):  acetaminophen   Tablet 650 milliGRAM(s) Oral every 6 hours PRN For Temp greater than 38 C (100.4 F)  acetaminophen   Tablet. 975 milliGRAM(s) Oral every 6 hours PRN Mild Pain (1 - 3)  dextrose Gel 1 Dose(s) Oral once PRN Blood Glucose LESS THAN 70 milliGRAM(s)/deciliter  glucagon  Injectable 1 milliGRAM(s) IntraMuscular once PRN Glucose LESS THAN 70 milligrams/deciliter  haloperidol    Injectable 1 milliGRAM(s) IntraMuscular every 6 hours PRN Agitation  ondansetron Injectable 4 milliGRAM(s) IV Push every 6 hours PRN Nausea and/or Vomiting  oxyCODONE    5 mG/acetaminophen 325 mG 1 Tablet(s) Oral every 6 hours PRN Moderate Pain (4 - 6)  oxyCODONE    5 mG/acetaminophen 325 mG 2 Tablet(s) Oral every 6 hours PRN Severe Pain (7 - 10)  senna 2 Tablet(s) Oral at bedtime PRN Constipation      PAST MEDICAL & SURGICAL HISTORY:  Herniated disc, cervical  Spinal stenosis  Fibromyalgia  No significant past surgical history      FAMILY HISTORY:  No pertinent family history in first degree relatives      SOCIAL HISTORY: No EtOH, no tobacco    REVIEW OF SYSTEMS:    CONSTITUTIONAL: No weakness, fevers or chills  EYES/ENT: No visual changes;  No vertigo or throat pain   NECK: No pain or stiffness  RESPIRATORY: No cough, wheezing, hemoptysis; No shortness of breath  CARDIOVASCULAR: No chest pain or palpitations  GASTROINTESTINAL: No abdominal or epigastric pain. No nausea, vomiting, or hematemesis; No diarrhea or constipation. No melena or hematochezia.  GENITOURINARY: No dysuria, frequency or hematuria  NEUROLOGICAL: No numbness or weakness  SKIN: No itching, burning, rashes, or lesions   All other review of systems is negative unless indicated above.    Height (cm): 162.56 (04-17 @ 04:50)  Weight (kg): 75.3 (04-17 @ 04:50)  BMI (kg/m2): 28.5 (04-17 @ 04:50)  BSA (m2): 1.81 (04-17 @ 04:50)    T(F): 97.6 (04-17-18 @ 17:52), Max: 98.8 (04-17-18 @ 04:38)  HR: 116 (04-17-18 @ 17:52)  BP: 120/75 (04-17-18 @ 17:52)  RR: 18 (04-17-18 @ 17:52)  SpO2: 94% (04-17-18 @ 17:52)  Wt(kg): --    GENERAL: NAD, well-developed  HEAD:  Atraumatic, Normocephalic  EYES: EOMI, PERRLA, conjunctiva and sclera clear  NECK: Supple, No JVD  CHEST/LUNG: Clear to auscultation bilaterally; No wheeze  HEART: Regular rate and rhythm; No murmurs, rubs, or gallops  ABDOMEN: Soft, Nontender, Nondistended; Bowel sounds present  EXTREMITIES:  2+ Peripheral Pulses, No clubbing, cyanosis, or edema  NEUROLOGY: non-focal  SKIN: No rashes or lesions                          10.4   11.51 )-----------( 425      ( 17 Apr 2018 07:41 )             30.8       04-17    138  |  98  |  22  ----------------------------<  158<H>  4.3   |  24  |  0.66    Ca    9.8      17 Apr 2018 06:14  Phos  3.9     04-17  Mg     2.2     04-17        Magnesium, Serum: 2.2 mg/dL (04-17 @ 06:14)  Phosphorus Level, Serum: 3.9 mg/dL (04-17 @ 06:14) HPI:  61 y/o F admitted for low back pain and falls. She is presenting with 2 falls in the past few months from the ice. Requesting an MRI that has been ordered by Neurology to evaluate worsening of her cervical and lumbar pathology (disc bulge) since the falls. Went to get MRI today and she couldn't lie still from too much pain and was told to the hospital to be "sedated" for MRI. Complaining of lumbar spine pain worse than cervical. Patient also complaining knee pain bilaterally, ankle and shoulder all chronic. No fevers, no episodes of incontinence over the past few months (did one time not make it to the bathroom in time). Patient received paraspinal injection with Dr. Baumann in office 3 weeks ago. No history of IV drug use."     In the ED, patient was slated to be discharged but later became emotionally disturbed, violent, and was crying. She was put on 1:1 and given Haldol. ED team contacted telepsychiatry but they stated they do not see inpatient admits. ED was advised to admit to medicine team for inpatient psych consult. Patient's vitals were WNL other than for borderline tachycardia. Labs generally unremarkable other than for leukocytosis of 15 and K of 3.3. CT scan was ordered in the ED to r/o fx. No vertebral fractures were noted and was otherwise remarkable for:   1. No lumbar spine fracture or traumatic spondylolisthesis.  2. Multilevel degenerative changes of the lumbar spine worst at L4-L5   resulting in moderate spinal canal stenosis and moderate left and mild   right neural foraminal stenosis and at L5-S1 resulting in moderate   bilateral neural foraminal stenosis.      Patient was later seen by medicine team, at which point she was awake but appeared confused. She said the "chair" she was sitting on (while on the hospital stretcher) was causing her pain. She says she "cannot walk" and says "I am in pain." She also said she wanted to leave the ED. (10 Apr 2018 10:20)      Allergies    No Known Allergies    Intolerances        MEDICATIONS  (STANDING):  aspirin  chewable 81 milliGRAM(s) Oral daily  ceFAZolin   IVPB 1000 milliGRAM(s) IV Intermittent every 8 hours  dextrose 5%. 1000 milliLiter(s) (50 mL/Hr) IV Continuous <Continuous>  dextrose 50% Injectable 12.5 Gram(s) IV Push once  dextrose 50% Injectable 25 Gram(s) IV Push once  dextrose 50% Injectable 25 Gram(s) IV Push once  docusate sodium 100 milliGRAM(s) Oral three times a day  heparin  Injectable 5000 Unit(s) SubCutaneous every 8 hours  influenza   Vaccine 0.5 milliLiter(s) IntraMuscular once  insulin lispro (HumaLOG) corrective regimen sliding scale   SubCutaneous three times a day before meals  pantoprazole    Tablet 40 milliGRAM(s) Oral daily  sodium chloride 0.9% with potassium chloride 20 mEq/L 1000 milliLiter(s) (75 mL/Hr) IV Continuous <Continuous>    MEDICATIONS  (PRN):  acetaminophen   Tablet 650 milliGRAM(s) Oral every 6 hours PRN For Temp greater than 38 C (100.4 F)  acetaminophen   Tablet. 975 milliGRAM(s) Oral every 6 hours PRN Mild Pain (1 - 3)  dextrose Gel 1 Dose(s) Oral once PRN Blood Glucose LESS THAN 70 milliGRAM(s)/deciliter  glucagon  Injectable 1 milliGRAM(s) IntraMuscular once PRN Glucose LESS THAN 70 milligrams/deciliter  haloperidol    Injectable 1 milliGRAM(s) IntraMuscular every 6 hours PRN Agitation  ondansetron Injectable 4 milliGRAM(s) IV Push every 6 hours PRN Nausea and/or Vomiting  oxyCODONE    5 mG/acetaminophen 325 mG 1 Tablet(s) Oral every 6 hours PRN Moderate Pain (4 - 6)  oxyCODONE    5 mG/acetaminophen 325 mG 2 Tablet(s) Oral every 6 hours PRN Severe Pain (7 - 10)  senna 2 Tablet(s) Oral at bedtime PRN Constipation      PAST MEDICAL & SURGICAL HISTORY:  Herniated disc, cervical  Spinal stenosis  Fibromyalgia  No significant past surgical history      FAMILY HISTORY:  No pertinent family history in first degree relatives      SOCIAL HISTORY: No EtOH, no tobacco    REVIEW OF SYSTEMS:    CONSTITUTIONAL: No weakness, fevers or chills  EYES/ENT: No visual changes;  No vertigo or throat pain   NECK: No pain or stiffness  RESPIRATORY: No cough, wheezing, hemoptysis; No shortness of breath  CARDIOVASCULAR: No chest pain or palpitations  GASTROINTESTINAL: No abdominal or epigastric pain. No nausea, vomiting, or hematemesis; No diarrhea or constipation. No melena or hematochezia.  GENITOURINARY: No dysuria, frequency or hematuria  NEUROLOGICAL: No numbness or weakness  SKIN: No itching, burning, rashes, or lesions   All other review of systems is negative unless indicated above.    Height (cm): 162.56 (04-17 @ 04:50)  Weight (kg): 75.3 (04-17 @ 04:50)  BMI (kg/m2): 28.5 (04-17 @ 04:50)  BSA (m2): 1.81 (04-17 @ 04:50)    T(F): 97.6 (04-17-18 @ 17:52), Max: 98.8 (04-17-18 @ 04:38)  HR: 116 (04-17-18 @ 17:52)  BP: 120/75 (04-17-18 @ 17:52)  RR: 18 (04-17-18 @ 17:52)  SpO2: 94% (04-17-18 @ 17:52)  Wt(kg): --    GENERAL: NAD, well-developed  HEAD:  Atraumatic, Normocephalic  EYES: EOMI, PERRLA, conjunctiva and sclera clear  NECK: Supple, No JVD  CHEST/LUNG: Clear to auscultation bilaterally; No wheeze  HEART: Regular rate and rhythm; No murmurs, rubs, or gallops  ABDOMEN: Soft, Nontender, Nondistended; Bowel sounds present  EXTREMITIES:  2+ Peripheral Pulses, No clubbing, cyanosis, or edema  NEUROLOGY: non-focal  SKIN: No rashes or lesions                          10.4   11.51 )-----------( 425      ( 17 Apr 2018 07:41 )             30.8       04-17    138  |  98  |  22  ----------------------------<  158<H>  4.3   |  24  |  0.66    Ca    9.8      17 Apr 2018 06:14  Phos  3.9     04-17  Mg     2.2     04-17        Magnesium, Serum: 2.2 mg/dL (04-17 @ 06:14)  Phosphorus Level, Serum: 3.9 mg/dL (04-17 @ 06:14) HPI:  61 y/o F admitted for low back pain and falls. She is presenting with 2 falls in the past few months from the ice. On the day of presentation, she was to get MRI today and she couldn't lie still from too much pain. No fevers, no episodes of incontinence over the past few months. Patient received paraspinal injection with Dr. Baumann in office 3 weeks ago.      During this hospitalization, she had LP x2, which was not diagnostic. Also had MRI spine, which showed abnormal enhancement involving the entire cauda equina extending to the ventral and dorsal aspects of the lower thoracic cord. +epidural complex possibly multiloculated abscess versus phlegmon   centered at the left L4-L5 facet joint. She underwent laminectomy with biopsy today, with prelim path demonstrating plasmoid cells.       Allergies    No Known Allergies    Intolerances        MEDICATIONS  (STANDING):  aspirin  chewable 81 milliGRAM(s) Oral daily  ceFAZolin   IVPB 1000 milliGRAM(s) IV Intermittent every 8 hours  dextrose 5%. 1000 milliLiter(s) (50 mL/Hr) IV Continuous <Continuous>  dextrose 50% Injectable 12.5 Gram(s) IV Push once  dextrose 50% Injectable 25 Gram(s) IV Push once  dextrose 50% Injectable 25 Gram(s) IV Push once  docusate sodium 100 milliGRAM(s) Oral three times a day  heparin  Injectable 5000 Unit(s) SubCutaneous every 8 hours  influenza   Vaccine 0.5 milliLiter(s) IntraMuscular once  insulin lispro (HumaLOG) corrective regimen sliding scale   SubCutaneous three times a day before meals  pantoprazole    Tablet 40 milliGRAM(s) Oral daily  sodium chloride 0.9% with potassium chloride 20 mEq/L 1000 milliLiter(s) (75 mL/Hr) IV Continuous <Continuous>    MEDICATIONS  (PRN):  acetaminophen   Tablet 650 milliGRAM(s) Oral every 6 hours PRN For Temp greater than 38 C (100.4 F)  acetaminophen   Tablet. 975 milliGRAM(s) Oral every 6 hours PRN Mild Pain (1 - 3)  dextrose Gel 1 Dose(s) Oral once PRN Blood Glucose LESS THAN 70 milliGRAM(s)/deciliter  glucagon  Injectable 1 milliGRAM(s) IntraMuscular once PRN Glucose LESS THAN 70 milligrams/deciliter  haloperidol    Injectable 1 milliGRAM(s) IntraMuscular every 6 hours PRN Agitation  ondansetron Injectable 4 milliGRAM(s) IV Push every 6 hours PRN Nausea and/or Vomiting  oxyCODONE    5 mG/acetaminophen 325 mG 1 Tablet(s) Oral every 6 hours PRN Moderate Pain (4 - 6)  oxyCODONE    5 mG/acetaminophen 325 mG 2 Tablet(s) Oral every 6 hours PRN Severe Pain (7 - 10)  senna 2 Tablet(s) Oral at bedtime PRN Constipation      PAST MEDICAL & SURGICAL HISTORY:  Herniated disc, cervical  Spinal stenosis  Fibromyalgia  No significant past surgical history      FAMILY HISTORY:  No pertinent family history in first degree relatives      SOCIAL HISTORY: No EtOH, no tobacco    REVIEW OF SYSTEMS:    CONSTITUTIONAL: No weakness, fevers or chills  EYES/ENT: No visual changes;  No vertigo or throat pain   NECK: + pain no stiffness  RESPIRATORY: No cough, wheezing, hemoptysis; No shortness of breath  CARDIOVASCULAR: No chest pain or palpitations  GASTROINTESTINAL: No abdominal or epigastric pain. No nausea, vomiting, or hematemesis; No diarrhea or constipation. No melena or hematochezia.  GENITOURINARY: No dysuria, frequency or hematuria  NEUROLOGICAL: No numbness or weakness  SKIN: No itching, burning, rashes, or lesions   All other review of systems is negative unless indicated above.    Height (cm): 162.56 (04-17 @ 04:50)  Weight (kg): 75.3 (04-17 @ 04:50)  BMI (kg/m2): 28.5 (04-17 @ 04:50)  BSA (m2): 1.81 (04-17 @ 04:50)    T(F): 97.6 (04-17-18 @ 17:52), Max: 98.8 (04-17-18 @ 04:38)  HR: 116 (04-17-18 @ 17:52)  BP: 120/75 (04-17-18 @ 17:52)  RR: 18 (04-17-18 @ 17:52)  SpO2: 94% (04-17-18 @ 17:52)  Wt(kg): --    GENERAL: NAD, well-developed  HEAD:  Atraumatic, Normocephalic  EYES: EOMI, PERRLA, conjunctiva and sclera clear  NECK: Supple, No JVD  CHEST/LUNG: Clear to auscultation bilaterally; No wheeze  HEART: Regular rate and rhythm; No murmurs, rubs, or gallops  ABDOMEN: Soft, Nontender, Nondistended; Bowel sounds present  EXTREMITIES:  2+ Peripheral Pulses, No clubbing, cyanosis, or edema  NEUROLOGY: non-focal  SKIN: No rashes or lesions                          10.4   11.51 )-----------( 425      ( 17 Apr 2018 07:41 )             30.8       04-17    138  |  98  |  22  ----------------------------<  158<H>  4.3   |  24  |  0.66    Ca    9.8      17 Apr 2018 06:14  Phos  3.9     04-17  Mg     2.2     04-17        Magnesium, Serum: 2.2 mg/dL (04-17 @ 06:14)  Phosphorus Level, Serum: 3.9 mg/dL (04-17 @ 06:14)

## 2018-04-17 NOTE — PROGRESS NOTE ADULT - ASSESSMENT
63 y/o F PMHx significant for lumbar degenerative disc disease and fibromyalgia p/w chronic lumbago as well as progressively worsening LE weakness and now with complaints of inability to walk.  MRI with severe spinal canal stenosis and abnormal enhancement of cauda equina concerning for phlegmon vs complex abscess, facetitis with possibilities of lymphoma, sarcoid, aracnoiditis on differential 63 y/o F PMHx significant for lumbar degenerative disc disease and fibromyalgia p/w chronic lumbago as well as progressively worsening LE weakness and now with complaints of inability to walk.  MRI with severe spinal canal stenosis and abnormal enhancement of cauda equina concerning for phlegmon vs complex abscess, facetitis with possibilities of lymphoma, sarcoid, aracnoiditis on differential   s/p partial L3 and L5 laminectomy and biopsy

## 2018-04-17 NOTE — PROGRESS NOTE ADULT - ASSESSMENT
62F with history of spinal stenosis s/p paraspinal injections p/w worsening back pain and elevated serum markers concerning for infection. MRI images reviewed which suggest lumbar spine epidural infection with slight paraspinal involvement. LP inconclusive x2. Neurosurgery called for possible biopsy as IR said no tragetable lesion. She does have b/l DF weakness which she states has been going on for a while (>48 hours), reports it is intermittent and related to leg swelling. Explained that it may be from this collection. She understands that she needs surgery.     -pre-op for OR today for L4-S1 laminectomy, open biopsy of abscess   - May benefit from anxiolytic, patient very nervous about waiting for surgery

## 2018-04-17 NOTE — CONSULT NOTE ADULT - ASSESSMENT
62yoF with 62yoF with DJD, admitted with back pain and falls, found to have     . s/p biopsy for epidural mass- with prelim path suggestive of plasmacytoid cells. 62yoF with DJD, admitted with back pain and falls, found to have epidural mass. s/p biopsy for epidural mass- with prelim path suggestive of plasmacytoid cells.    - will followup final pathology results  - review images with radiology  - discussed with patient and  if biopsy confirms plasmacytoma, will need radon evaluation  - obtain myeloma labs: SPEP, LUCRECIA, quantitative immunoglobulins, light chains  - will continue to follow 62yoF with DJD, admitted with back pain and falls, found to have epidural mass. s/p biopsy for epidural mass- with prelim path suggestive of plasmacytoid cells.    - will need to followup final pathology results   - review images with radiology  - discussed with patient and  if biopsy confirms plasmacytoma, will need radon evaluation  - obtain myeloma labs: SPEP, LUCRECIA, quantitative immunoglobulins, light chains  - will continue to follow 62yoF with DJD, admitted with back pain and falls, found to have epidural mass. s/p biopsy for epidural mass- with prelim path suggestive of plasmacytoid cells.    - will need to followup final pathology results , will discuss with pathology  - review images with radiology  - discussed with patient and  if biopsy confirms plasmacytoma, will need [facility] evaluation and complete workup  - obtain myeloma labs: SPEP, LUCRECIA, quantitative immunoglobulins, light chains, pending  - will continue to follow 62yoF with DJD, admitted with back pain and falls, found to have epidural mass. s/p laminectomy with biopsy for epidural mass- with prelim path suggestive of plasmacytoid cells.    - will need to followup final pathology results , will discuss with pathology  - review images with radiology  - discussed with patient and  if biopsy confirms plasmacytoma, will need [facility] evaluation and complete workup  - obtain myeloma labs: SPEP, LUCRECIA, quantitative immunoglobulins, light chains, pending  - will continue to follow

## 2018-04-17 NOTE — PROGRESS NOTE ADULT - ATTENDING COMMENTS
for laminectomy today. Holding on steroids for now. Follow tachycardia after procedure, needs DVT U/S.

## 2018-04-17 NOTE — PROGRESS NOTE ADULT - PROBLEM SELECTOR PLAN 1
MR lumbar spine suspicious for phlegmon w/ arachnoiditis vs malignancy   s/p partial L3 and L5 laminectomy w/ biopsy today. Initial read by pathology team: plasmacytoid cells concerning for multiple myeloma.   - Heme/ Onc consulted  - Immunofixation, free light chains, SPEP, UPEP, Immunoglobulins, flow cytometry pending   - Will restart asa and HSQ   - Pain control - c/w NSAIDs and Tylenol for tolerable pain, oxycodone for severe  - PT consulted  - Fall precautions

## 2018-04-17 NOTE — PROGRESS NOTE ADULT - PROBLEM SELECTOR PLAN 4
New diagnosis of DMT2  - FS and ISSS incidental finding of septal perforation. Concern for granulomatous disease however ACE wnl   - CT sinuses pending, based on those results will consider ENT consult

## 2018-04-17 NOTE — PROGRESS NOTE ADULT - ASSESSMENT
62year old with DJD, s/p trigger point injection, presented with back pain.    S/p laminectomy for epidural mass- with prelim path suggestive of plasmacytoid cells.    Await final path and cultures- seems less likely to be an infectious process    The risk / benefit of steroids need to be considered by the neurology team.      Will follow intermittently call ID service with questions.

## 2018-04-17 NOTE — PROGRESS NOTE ADULT - SUBJECTIVE AND OBJECTIVE BOX
Patient is a 62y old  Female who presents with a chief complaint of CC: "I need an emergent MRI" (10 Apr 2018 10:20)      SUBJECTIVE / OVERNIGHT EVENTS:  Overnight: no events  Patient is sleeping after sedation from surgery this morning     MEDICATIONS  (STANDING):  ceFAZolin   IVPB 1000 milliGRAM(s) IV Intermittent every 8 hours  dextrose 5%. 1000 milliLiter(s) (50 mL/Hr) IV Continuous <Continuous>  dextrose 50% Injectable 12.5 Gram(s) IV Push once  dextrose 50% Injectable 25 Gram(s) IV Push once  dextrose 50% Injectable 25 Gram(s) IV Push once  docusate sodium 100 milliGRAM(s) Oral three times a day  influenza   Vaccine 0.5 milliLiter(s) IntraMuscular once  insulin lispro (HumaLOG) corrective regimen sliding scale   SubCutaneous three times a day before meals  pantoprazole    Tablet 40 milliGRAM(s) Oral daily  sodium chloride 0.9% with potassium chloride 20 mEq/L 1000 milliLiter(s) (75 mL/Hr) IV Continuous <Continuous>    MEDICATIONS  (PRN):  acetaminophen   Tablet 650 milliGRAM(s) Oral every 6 hours PRN For Temp greater than 38 C (100.4 F)  acetaminophen   Tablet. 975 milliGRAM(s) Oral every 6 hours PRN Mild Pain (1 - 3)  dextrose Gel 1 Dose(s) Oral once PRN Blood Glucose LESS THAN 70 milliGRAM(s)/deciliter  glucagon  Injectable 1 milliGRAM(s) IntraMuscular once PRN Glucose LESS THAN 70 milligrams/deciliter  haloperidol    Injectable 1 milliGRAM(s) IntraMuscular every 6 hours PRN Agitation  HYDROmorphone  Injectable 0.25 milliGRAM(s) IV Push every 10 minutes PRN Moderate Pain (4 - 6)  ondansetron Injectable 4 milliGRAM(s) IV Push once PRN Nausea and/or Vomiting  ondansetron Injectable 4 milliGRAM(s) IV Push every 6 hours PRN Nausea and/or Vomiting  oxyCODONE    5 mG/acetaminophen 325 mG 1 Tablet(s) Oral every 6 hours PRN Moderate Pain (4 - 6)  oxyCODONE    5 mG/acetaminophen 325 mG 2 Tablet(s) Oral every 6 hours PRN Severe Pain (7 - 10)  senna 2 Tablet(s) Oral at bedtime PRN Constipation        CAPILLARY BLOOD GLUCOSE      POCT Blood Glucose.: 184 mg/dL (17 Apr 2018 06:54)  POCT Blood Glucose.: 184 mg/dL (16 Apr 2018 22:00)  POCT Blood Glucose.: 145 mg/dL (16 Apr 2018 17:47)    I&O's Summary    16 Apr 2018 07:01  -  17 Apr 2018 07:00  --------------------------------------------------------  IN: 960 mL / OUT: 0 mL / NET: 960 mL        PHYSICAL EXAM:  GENERAL: NA  HEAD:  Atraumatic, Normocephalic  CHEST/LUNG: Clear to auscultation bilaterally anteriorly   HEART: Regular rate and rhythm; No murmurs, rubs, or gallops  ABDOMEN: Soft, Nontender, Nondistended; Bowel sounds present  EXTREMITIES:  2+ Peripheral Pulses, No clubbing, cyanosis, or edema    LABS:                        10.4   11.51 )-----------( 425      ( 17 Apr 2018 07:41 )             30.8     04-17    138  |  98  |  22  ----------------------------<  158<H>  4.3   |  24  |  0.66    Ca    9.8      17 Apr 2018 06:14  Phos  3.9     04-17  Mg     2.2     04-17      PT/INR - ( 17 Apr 2018 07:41 )   PT: 10.5 sec;   INR: 0.93 ratio         PTT - ( 17 Apr 2018 07:41 )  PTT:36.1 sec

## 2018-04-17 NOTE — PROGRESS NOTE ADULT - PROBLEM SELECTOR PLAN 2
CSF negative for infection   - Neurology team recommends prednisone 60mg and taper over 6 days however given concern for infection, steroids contraindicated. Pending clearance from ID  - As per neurology team, the steroids are for symptomatic treatment CSF negative for infection   - Neurology team recommends prednisone 60mg and taper over 6 days however given concern for infection, steroids contraindicated.   - As per neurology team, the steroids are for symptomatic treatment

## 2018-04-17 NOTE — PROGRESS NOTE ADULT - SUBJECTIVE AND OBJECTIVE BOX
Visit Summary: Patient seen and evaluated at bedside.    Overnight Events: none    Exam:  Vital Signs Last 24 Hrs  Vital Signs Last 24 Hrs  T(C): 37.1 (17 Apr 2018 04:38), Max: 37.1 (17 Apr 2018 04:38)  T(F): 98.8 (17 Apr 2018 04:38), Max: 98.8 (17 Apr 2018 04:38)  HR: 105 (17 Apr 2018 04:38) (105 - 114)  BP: 117/75 (17 Apr 2018 04:38) (107/62 - 117/75)  BP(mean): --  RR: 18 (17 Apr 2018 04:38) (18 - 18)  SpO2: 96% (17 Apr 2018 04:38) (96% - 99%)    PHYSICAL EXAM:    Constitutional: No Acute Distress     Neurological: AOx3, Following Commands    Motor exam:          Upper extremity                         Delt     Bicep     Tricep    HG                                                 R         5/5        5/5        5/5       5/5                                               L          5/5        5/5        5/5       5/5          Lower extremity                        HF         KF        KE       DF         PF                                                  R        5/5        5/5        5/5       2/5         5/5                                               L         5/5        5/5       5/5       2/5          5/5                                                 Sensation: [x] intact to light touch  [] decreased:     No clonus

## 2018-04-17 NOTE — PROGRESS NOTE ADULT - SUBJECTIVE AND OBJECTIVE BOX
Follow Up:      Inverval History/ROS:Patient is a 62y old  Female who presents with a chief complaint of CC: "I need an emergent MRI" (10 Apr 2018 10:20)    S/p laminectomy    Prelim path with plamacytoid cells.        Allergies    No Known Allergies    Intolerances        ANTIMICROBIALS:  ceFAZolin   IVPB 1000 every 8 hours      OTHER MEDS:  acetaminophen   Tablet 650 milliGRAM(s) Oral every 6 hours PRN  acetaminophen   Tablet. 975 milliGRAM(s) Oral every 6 hours PRN  aspirin  chewable 81 milliGRAM(s) Oral daily  dextrose 5%. 1000 milliLiter(s) IV Continuous <Continuous>  dextrose 50% Injectable 12.5 Gram(s) IV Push once  dextrose 50% Injectable 25 Gram(s) IV Push once  dextrose 50% Injectable 25 Gram(s) IV Push once  dextrose Gel 1 Dose(s) Oral once PRN  docusate sodium 100 milliGRAM(s) Oral three times a day  glucagon  Injectable 1 milliGRAM(s) IntraMuscular once PRN  haloperidol    Injectable 1 milliGRAM(s) IntraMuscular every 6 hours PRN  heparin  Injectable 5000 Unit(s) SubCutaneous every 8 hours  HYDROmorphone  Injectable 0.25 milliGRAM(s) IV Push every 10 minutes PRN  influenza   Vaccine 0.5 milliLiter(s) IntraMuscular once  insulin lispro (HumaLOG) corrective regimen sliding scale   SubCutaneous three times a day before meals  ondansetron Injectable 4 milliGRAM(s) IV Push once PRN  ondansetron Injectable 4 milliGRAM(s) IV Push every 6 hours PRN  oxyCODONE    5 mG/acetaminophen 325 mG 1 Tablet(s) Oral every 6 hours PRN  oxyCODONE    5 mG/acetaminophen 325 mG 2 Tablet(s) Oral every 6 hours PRN  pantoprazole    Tablet 40 milliGRAM(s) Oral daily  senna 2 Tablet(s) Oral at bedtime PRN  sodium chloride 0.9% with potassium chloride 20 mEq/L 1000 milliLiter(s) IV Continuous <Continuous>      Vital Signs Last 24 Hrs  T(C): 37 (17 Apr 2018 15:00), Max: 37.1 (17 Apr 2018 04:38)  T(F): 98.6 (17 Apr 2018 15:00), Max: 98.8 (17 Apr 2018 04:38)  HR: 97 (17 Apr 2018 15:00) (77 - 105)  BP: 101/58 (17 Apr 2018 15:00) (96/57 - 124/73)  BP(mean): 72 (17 Apr 2018 14:30) (70 - 93)  RR: 16 (17 Apr 2018 15:00) (15 - 18)  SpO2: 98% (17 Apr 2018 15:00) (94% - 99%)    PHYSICAL EXAM:  General: [x ] non-toxic  HEAD/EYES: [ ] PERRL [x ] white sclera [ ] icterus  ENT:  [ ] normal [ ] supple [ ] thrush [ ] pharyngeal exudate  Cardiovascular:   [ ] murmur [ x] normal [ ] PPM/AICD  Respiratory:  [x ] clear to ausculation bilaterally  GI:  [x ] soft, non-tender, normal bowel sounds  :  [ ] ryan [ ] no CVA tenderness   Musculoskeletal:  [ ] no synovitis  Neurologic:  [ ] non-focal exam   Skin:  [x ] no rash  Lymph: [ ] no lymphadenopathy  Psychiatric:  [x ] appropriate affect [ ] alert & oriented  Lines:  [ x] no phlebitis [ ] central line                                10.4   11.51 )-----------( 425      ( 17 Apr 2018 07:41 )             30.8       04-17    138  |  98  |  22  ----------------------------<  158<H>  4.3   |  24  |  0.66    Ca    9.8      17 Apr 2018 06:14  Phos  3.9     04-17  Mg     2.2     04-17            MICROBIOLOGY:Culture Results:   No growth (04-13-18 @ 15:22)  Culture Results:   No growth (04-12-18 @ 17:41)  Culture Results:   Testing in progress (04-12-18 @ 17:41)  Culture Results:   <10,000 CFU/ml  Normal Urogenital ami present (04-10-18 @ 19:04)  Culture Results:   No growth at 5 days. (04-10-18 @ 18:55)  Culture Results:   No growth at 5 days. (04-10-18 @ 18:55)      RADIOLOGY:

## 2018-04-18 LAB
ALBUMIN SERPL ELPH-MCNC: 3.6 G/DL — SIGNIFICANT CHANGE UP (ref 3.3–5)
ALP SERPL-CCNC: 93 U/L — SIGNIFICANT CHANGE UP (ref 40–120)
ALT FLD-CCNC: 20 U/L — SIGNIFICANT CHANGE UP (ref 10–45)
ANION GAP SERPL CALC-SCNC: 12 MMOL/L — SIGNIFICANT CHANGE UP (ref 5–17)
ANION GAP SERPL CALC-SCNC: 14 MMOL/L — SIGNIFICANT CHANGE UP (ref 5–17)
AST SERPL-CCNC: 12 U/L — SIGNIFICANT CHANGE UP (ref 10–40)
B BURGDOR DNA SPEC QL NAA+PROBE: NEGATIVE — SIGNIFICANT CHANGE UP
BASOPHILS # BLD AUTO: 0.01 K/UL — SIGNIFICANT CHANGE UP (ref 0–0.2)
BASOPHILS NFR BLD AUTO: 0.1 % — SIGNIFICANT CHANGE UP (ref 0–2)
BILIRUB SERPL-MCNC: 0.4 MG/DL — SIGNIFICANT CHANGE UP (ref 0.2–1.2)
BUN SERPL-MCNC: 17 MG/DL — SIGNIFICANT CHANGE UP (ref 7–23)
BUN SERPL-MCNC: 17 MG/DL — SIGNIFICANT CHANGE UP (ref 7–23)
CALCIUM SERPL-MCNC: 9.4 MG/DL — SIGNIFICANT CHANGE UP (ref 8.4–10.5)
CALCIUM SERPL-MCNC: 9.7 MG/DL — SIGNIFICANT CHANGE UP (ref 8.4–10.5)
CHLORIDE SERPL-SCNC: 102 MMOL/L — SIGNIFICANT CHANGE UP (ref 96–108)
CHLORIDE SERPL-SCNC: 102 MMOL/L — SIGNIFICANT CHANGE UP (ref 96–108)
CO2 SERPL-SCNC: 25 MMOL/L — SIGNIFICANT CHANGE UP (ref 22–31)
CO2 SERPL-SCNC: 25 MMOL/L — SIGNIFICANT CHANGE UP (ref 22–31)
CREAT SERPL-MCNC: 0.61 MG/DL — SIGNIFICANT CHANGE UP (ref 0.5–1.3)
CREAT SERPL-MCNC: 0.63 MG/DL — SIGNIFICANT CHANGE UP (ref 0.5–1.3)
EOSINOPHIL # BLD AUTO: 0.05 K/UL — SIGNIFICANT CHANGE UP (ref 0–0.5)
EOSINOPHIL NFR BLD AUTO: 0.3 % — SIGNIFICANT CHANGE UP (ref 0–6)
GLUCOSE SERPL-MCNC: 133 MG/DL — HIGH (ref 70–99)
GLUCOSE SERPL-MCNC: 135 MG/DL — HIGH (ref 70–99)
HCT VFR BLD CALC: 30.2 % — LOW (ref 34.5–45)
HGB BLD-MCNC: 9.6 G/DL — LOW (ref 11.5–15.5)
IGA FLD-MCNC: 302 MG/DL — SIGNIFICANT CHANGE UP (ref 68–378)
IGG FLD-MCNC: 869 MG/DL — SIGNIFICANT CHANGE UP (ref 694–1618)
IGM SERPL-MCNC: 102 MG/DL — SIGNIFICANT CHANGE UP (ref 40–230)
IMM GRANULOCYTES NFR BLD AUTO: 0.2 % — SIGNIFICANT CHANGE UP (ref 0–1.5)
KAPPA LC SER QL IFE: 1.22 MG/DL — SIGNIFICANT CHANGE UP (ref 0.33–1.94)
KAPPA LC SER QL IFE: 1.22 MG/DL — SIGNIFICANT CHANGE UP (ref 0.33–1.94)
KAPPA/LAMBDA FREE LIGHT CHAIN RATIO, SERUM: 0.9 RATIO — SIGNIFICANT CHANGE UP (ref 0.26–1.65)
KAPPA/LAMBDA FREE LIGHT CHAIN RATIO, SERUM: 0.9 RATIO — SIGNIFICANT CHANGE UP (ref 0.26–1.65)
LAMBDA LC SER QL IFE: 1.36 MG/DL — SIGNIFICANT CHANGE UP (ref 0.57–2.63)
LAMBDA LC SER QL IFE: 1.36 MG/DL — SIGNIFICANT CHANGE UP (ref 0.57–2.63)
LYMPHOCYTES # BLD AUTO: 16.8 % — SIGNIFICANT CHANGE UP (ref 13–44)
LYMPHOCYTES # BLD AUTO: 2.76 K/UL — SIGNIFICANT CHANGE UP (ref 1–3.3)
MAGNESIUM SERPL-MCNC: 1.9 MG/DL — SIGNIFICANT CHANGE UP (ref 1.6–2.6)
MCHC RBC-ENTMCNC: 30.6 PG — SIGNIFICANT CHANGE UP (ref 27–34)
MCHC RBC-ENTMCNC: 31.8 GM/DL — LOW (ref 32–36)
MCV RBC AUTO: 96.2 FL — SIGNIFICANT CHANGE UP (ref 80–100)
MONOCYTES # BLD AUTO: 1.17 K/UL — HIGH (ref 0–0.9)
MONOCYTES NFR BLD AUTO: 7.1 % — SIGNIFICANT CHANGE UP (ref 2–14)
NEUTROPHILS # BLD AUTO: 12.38 K/UL — HIGH (ref 1.8–7.4)
NEUTROPHILS NFR BLD AUTO: 75.5 % — SIGNIFICANT CHANGE UP (ref 43–77)
PHOSPHATE SERPL-MCNC: 3.7 MG/DL — SIGNIFICANT CHANGE UP (ref 2.5–4.5)
PLATELET # BLD AUTO: 407 K/UL — HIGH (ref 150–400)
POTASSIUM SERPL-MCNC: 4.3 MMOL/L — SIGNIFICANT CHANGE UP (ref 3.5–5.3)
POTASSIUM SERPL-MCNC: 4.3 MMOL/L — SIGNIFICANT CHANGE UP (ref 3.5–5.3)
POTASSIUM SERPL-SCNC: 4.3 MMOL/L — SIGNIFICANT CHANGE UP (ref 3.5–5.3)
POTASSIUM SERPL-SCNC: 4.3 MMOL/L — SIGNIFICANT CHANGE UP (ref 3.5–5.3)
PROT SERPL-MCNC: 6.9 G/DL — SIGNIFICANT CHANGE UP (ref 6–8.3)
RBC # BLD: 3.14 M/UL — LOW (ref 3.8–5.2)
RBC # FLD: 13.7 % — SIGNIFICANT CHANGE UP (ref 10.3–14.5)
SODIUM SERPL-SCNC: 139 MMOL/L — SIGNIFICANT CHANGE UP (ref 135–145)
SODIUM SERPL-SCNC: 141 MMOL/L — SIGNIFICANT CHANGE UP (ref 135–145)
WBC # BLD: 16.41 K/UL — HIGH (ref 3.8–10.5)
WBC # FLD AUTO: 16.41 K/UL — HIGH (ref 3.8–10.5)

## 2018-04-18 PROCEDURE — 99232 SBSQ HOSP IP/OBS MODERATE 35: CPT

## 2018-04-18 PROCEDURE — 99233 SBSQ HOSP IP/OBS HIGH 50: CPT | Mod: GC

## 2018-04-18 RX ORDER — IBUPROFEN 200 MG
600 TABLET ORAL EVERY 6 HOURS
Qty: 0 | Refills: 0 | Status: DISCONTINUED | OUTPATIENT
Start: 2018-04-18 | End: 2018-04-20

## 2018-04-18 RX ORDER — ACETAMINOPHEN 500 MG
975 TABLET ORAL EVERY 12 HOURS
Qty: 0 | Refills: 0 | Status: DISCONTINUED | OUTPATIENT
Start: 2018-04-18 | End: 2018-04-20

## 2018-04-18 RX ORDER — OXYCODONE AND ACETAMINOPHEN 5; 325 MG/1; MG/1
2 TABLET ORAL EVERY 6 HOURS
Qty: 0 | Refills: 0 | Status: DISCONTINUED | OUTPATIENT
Start: 2018-04-18 | End: 2018-04-20

## 2018-04-18 RX ADMIN — Medication 2: at 17:59

## 2018-04-18 RX ADMIN — OXYCODONE AND ACETAMINOPHEN 2 TABLET(S): 5; 325 TABLET ORAL at 02:23

## 2018-04-18 RX ADMIN — OXYCODONE AND ACETAMINOPHEN 2 TABLET(S): 5; 325 TABLET ORAL at 03:00

## 2018-04-18 RX ADMIN — Medication 100 MILLIGRAM(S): at 09:43

## 2018-04-18 RX ADMIN — Medication 100 MILLIGRAM(S): at 13:06

## 2018-04-18 RX ADMIN — HEPARIN SODIUM 5000 UNIT(S): 5000 INJECTION INTRAVENOUS; SUBCUTANEOUS at 06:42

## 2018-04-18 RX ADMIN — Medication 1: at 08:41

## 2018-04-18 RX ADMIN — Medication 600 MILLIGRAM(S): at 13:43

## 2018-04-18 RX ADMIN — OXYCODONE AND ACETAMINOPHEN 2 TABLET(S): 5; 325 TABLET ORAL at 08:19

## 2018-04-18 RX ADMIN — Medication 81 MILLIGRAM(S): at 13:06

## 2018-04-18 RX ADMIN — Medication 975 MILLIGRAM(S): at 18:01

## 2018-04-18 RX ADMIN — HEPARIN SODIUM 5000 UNIT(S): 5000 INJECTION INTRAVENOUS; SUBCUTANEOUS at 20:55

## 2018-04-18 RX ADMIN — Medication 975 MILLIGRAM(S): at 17:48

## 2018-04-18 RX ADMIN — OXYCODONE AND ACETAMINOPHEN 2 TABLET(S): 5; 325 TABLET ORAL at 08:50

## 2018-04-18 RX ADMIN — Medication 600 MILLIGRAM(S): at 20:55

## 2018-04-18 RX ADMIN — PANTOPRAZOLE SODIUM 40 MILLIGRAM(S): 20 TABLET, DELAYED RELEASE ORAL at 13:06

## 2018-04-18 RX ADMIN — Medication 600 MILLIGRAM(S): at 21:25

## 2018-04-18 RX ADMIN — Medication 100 MILLIGRAM(S): at 02:27

## 2018-04-18 RX ADMIN — HEPARIN SODIUM 5000 UNIT(S): 5000 INJECTION INTRAVENOUS; SUBCUTANEOUS at 13:06

## 2018-04-18 RX ADMIN — Medication 600 MILLIGRAM(S): at 14:10

## 2018-04-18 NOTE — PHYSICAL THERAPY INITIAL EVALUATION ADULT - GAIT DEVIATIONS NOTED, PT EVAL
increased time in double stance/decreased step length/decreased igor/decreased velocity of limb motion

## 2018-04-18 NOTE — PHYSICAL THERAPY INITIAL EVALUATION ADULT - MANUAL MUSCLE TESTING RESULTS, REHAB EVAL
BUE grossly good; bilat hip flexors fair+; bilat knee extensors good; bilat ankle plantarflexors fair-
grossly assessed due to/B UE/LE at least 3-/5

## 2018-04-18 NOTE — PROGRESS NOTE ADULT - SUBJECTIVE AND OBJECTIVE BOX
Post-Anesthetic Evaluation:    The Patient was interviewed and evaluated    Evaluation:      (X) No apparent complications or complaints regarding anesthesia care at this time  (X) All questions were answered    Condition:  (X) Stable      ( ) Guarded      ( ) Critical    Recommendations:  (X) None     ( ) Other:

## 2018-04-18 NOTE — PROGRESS NOTE ADULT - SUBJECTIVE AND OBJECTIVE BOX
Patient is a 62y old  Female who presents with a chief complaint of CC: "I need an emergent MRI" (10 Apr 2018 10:20), LBP s/p back surgery.       NEUROLOGIC EXAM  Mental Status:     Oriented to time/place/person; Good eye contact ; follow simple commands ;     Cranial Nerves:   PERRL, EOMI, no facial asymmetry , V1-V3 intact , symmetric palate, tongue midline.   Muscle Strength:	moves all 4 extremities  Muscle Tone:	Normal tone  Deep Tendon Reflexes:         1+/4  : Biceps, Brachioradialis, Triceps Bilateral;  2+/4 : Pattelar, Ankle bilateral. No clonus.  Plantar Response:	Plantar reflexes flexion bilaterally  Sensation:		Intact to pain, light touch, temperature and vibration throughout.  Coordination/	No dysmetria in finger to nose test bilaterally  Cerebellum	  Tandem Gait/Romberg	Not tested      Vital Signs Last 24 Hrs  T(C): 36.9 (18 Apr 2018 08:04), Max: 37 (17 Apr 2018 15:00)  T(F): 98.5 (18 Apr 2018 08:04), Max: 98.6 (17 Apr 2018 15:00)  HR: 101 (18 Apr 2018 08:04) (77 - 116)  BP: 100/67 (18 Apr 2018 08:04) (96/57 - 139/86)  BP(mean): 72 (17 Apr 2018 14:30) (70 - 93)  RR: 18 (18 Apr 2018 08:04) (15 - 18)  SpO2: 91% (18 Apr 2018 08:04) (90% - 100%)  MEDICATIONS  (STANDING):  aspirin  chewable 81 milliGRAM(s) Oral daily  ceFAZolin   IVPB 1000 milliGRAM(s) IV Intermittent every 8 hours  dextrose 5%. 1000 milliLiter(s) (50 mL/Hr) IV Continuous <Continuous>  dextrose 50% Injectable 12.5 Gram(s) IV Push once  dextrose 50% Injectable 25 Gram(s) IV Push once  dextrose 50% Injectable 25 Gram(s) IV Push once  docusate sodium 100 milliGRAM(s) Oral three times a day  heparin  Injectable 5000 Unit(s) SubCutaneous every 8 hours  influenza   Vaccine 0.5 milliLiter(s) IntraMuscular once  insulin lispro (HumaLOG) correc< from: MR Lumbar Spine w/wo IV Cont (04.11.18 @ 21:03) >    IMPRESSION:  There is no cord compression along the cervical or thoracic spine. The   spinal cord demonstrates normal course and caliber.    There is abnormal enhancement involving the entire cauda equina extending   to the ventral and dorsal aspects of the lower thoracic cord. There is   abnormal epidural complex possibly multiloculated abscess versus phlegmon   centered at the left L4-L5 facet joint, possibly involving the L5-S1   facet.  Severe spinal canal stenosis with compression of all descending   enhancing nerves at L4-L5 isseen. There is abnormal enhancement of the   intervertebral disc bordering this process at L4-L5 and L5-S1.    The constellation of these findings is most consistent with left L4-L5,   possibly L5-S1 facetitis with resulting epidural phlegmon versus complex   abscess spanning L3-L5 and arachnoiditis involving the cauda equina. The   differential diagnosis of sarcoidosis versus lymphoma are not entirely   excluded; please correlate with patient history, and clinical and   laboratory exam findings.    These findings were discussed with Dr. MOLINA at 4/12/2018 9:45 AM   by Dr. Hodges.       < end of copied text >  tive regimen sliding scale   SubCutaneous three times a day before meals  pantoprazole    Tablet 40 milliGRAM(s) Oral daily  sodium chloride 0.9% with potassium chloride 20 mEq/L 1000 milliLiter(s) (75 mL/Hr) IV Continuous <Continuous>    MEDICATIONS  (PRN):  acetaminophen   Tablet 650 milliGRAM(s) Oral every 6 hours PRN For Temp greater than 38 C (100.4 F)  acetaminophen   Tablet. 975 milliGRAM(s) Oral every 6 hours PRN Mild Pain (1 - 3)  dextrose Gel 1 Dose(s) Oral once PRN Blood Glucose LESS THAN 70 milliGRAM(s)/deciliter  glucagon  Injectable 1 milliGRAM(s) IntraMuscular once PRN Glucose LESS THAN 70 milligrams/deciliter  haloperidol    Injectable 1 milliGRAM(s) IntraMuscular every 6 hours PRN Agitation  ondansetron Injectable 4 milliGRAM(s) IV Push every 6 hours PRN Nausea and/or Vomiting  oxyCODONE    5 mG/acetaminophen 325 mG 1 Tablet(s) Oral every 6 hours PRN Moderate Pain (4 - 6)  oxyCODONE    5 mG/acetaminophen 325 mG 2 Tablet(s) Oral every 6 hours PRN Severe Pain (7 - 10)  senna 2 Tablet(s) Oral at bedtime PRN Constipation

## 2018-04-18 NOTE — PROGRESS NOTE ADULT - ASSESSMENT
62F with history of spinal stenosis s/p paraspinal injections p/w worsening back pain and elevated serum markers concerning for infection. MRI images reviewed which suggest lumbar spine epidural infection with slight paraspinal involvement. LP inconclusive x2. Neurosurgery called for possible biopsy as IR said no tragetable lesion. She does have b/l DF weakness which she states has been going on for a while (>48 hours), reports it is intermittent and related to leg swelling. Explained that it may be from this collection. She understands that she needs surgery.  Cx pending/path pending. HMV 10. Wound checks.

## 2018-04-18 NOTE — PHYSICAL THERAPY INITIAL EVALUATION ADULT - GAIT TRAINING, PT EVAL
GOAL: Pt will ambulate using appropriate assistive device x 150 feet indep in 2 weeks.
GOAL: Pt will ambulate 150 feet with least restrictive assistive device and independence in 2 weeks.

## 2018-04-18 NOTE — PHYSICAL THERAPY INITIAL EVALUATION ADULT - ADDITIONAL COMMENTS
Pt states she lives in a private house, 2 steps to enter. Pt states being a household ambulator due to back pain at least 6 weeks prior to admission; spouse and friend assists in ADLs as needed
Unable to obtain full social hx as pt lethargic and minimally responding to PT questions; PTA, pt living at home with spouse, ambulatory.

## 2018-04-18 NOTE — PHYSICAL THERAPY INITIAL EVALUATION ADULT - ACTIVE RANGE OF MOTION EXAMINATION, REHAB EVAL
bilateral  lower extremity Active ROM was WFL (within functional limits)/bilateral upper extremity Active ROM was WFL (within functional limits)
bilateral upper extremity Active ROM was WFL (within functional limits)/bilateral  lower extremity Active ROM was WFL (within functional limits)

## 2018-04-18 NOTE — PROGRESS NOTE ADULT - PROBLEM SELECTOR PLAN 2
CSF negative for infection   - Neurology team recommends prednisone 60mg and taper over 6 days however given concern for infection, steroids contraindicated.   - As per neurology team, the steroids are for symptomatic treatment

## 2018-04-18 NOTE — PHYSICAL THERAPY INITIAL EVALUATION ADULT - PLANNED THERAPY INTERVENTIONS, PT EVAL
transfer training/balance training/gait training/GOAL: Pt will negotiate 12 stairs with 1 HR and step to pattern with independence in 2 weeks.
transfer training/balance training/bed mobility training/gait training

## 2018-04-18 NOTE — PROGRESS NOTE ADULT - PROBLEM SELECTOR PLAN 1
r/o abcess, r/o osteo, r/o MM.    Patient had laminectomy and biopsy yesterday.  F/u final cytology.  ID and Oncology f/u.  Medicine f/u.  Currently pain is improved.  PT.  NS f/u.  Will follow.

## 2018-04-18 NOTE — PHYSICAL THERAPY INITIAL EVALUATION ADULT - ASSISTIVE DEVICE FOR TRANSFER: SIT/STAND, REHAB EVAL
rolling walker
rolling walker/stood x3 reps, stood to the [place] steady x8 reps. Pt was unable to stand fully upright, states she has too much back pain

## 2018-04-18 NOTE — PROGRESS NOTE ADULT - ASSESSMENT
63 y/o F PMHx significant for lumbar degenerative disc disease and fibromyalgia p/w chronic lumbago as well as progressively worsening LE weakness and now with complaints of inability to walk.  MRI with severe spinal canal stenosis and abnormal enhancement of cauda equina concerning for phlegmon vs complex abscess, facetitis with possibilities of lymphoma, sarcoid, aracnoiditis on differential   s/p partial L3 and L5 laminectomy and biopsy 61 y/o F PMHx significant for lumbar degenerative disc disease and fibromyalgia p/w chronic lumbago as well as progressively worsening LE weakness and now with complaints of inability to walk.  MRI with severe spinal canal stenosis and abnormal enhancement of cauda equina concerning for phlegmon vs complex abscess, facetitis with possibilities of lymphoma, sarcoid, aracnoiditis on differential   s/p partial L3 and L5 laminectomy and biopsy, concerning for MM in context of plasmacytoids noted on prelim bx read as well as CSF.

## 2018-04-18 NOTE — PROGRESS NOTE ADULT - PROBLEM SELECTOR PLAN 1
MR lumbar spine suspicious for phlegmon w/ arachnoiditis vs malignancy   s/p partial L3 and L5 laminectomy w/ biopsy today. Initial read by pathology team: plasmacytoid cells concerning for multiple myeloma.   - Heme/ Onc consulted  - Immunofixation, free light chains, SPEP, UPEP, Immunoglobulins, flow cytometry pending   - C/w asa and HSQ   - Pain control - c/w NSAIDs and Tylenol for tolerable pain, oxycodone for severe  - PT consulted  - Fall precautions MR lumbar spine suspicious for phlegmon w/ arachnoiditis vs malignancy   s/p partial L3 and L5 laminectomy w/ biopsy today. Initial read by pathology team: plasmacytoid cells concerning for multiple myeloma, now also noted in CSF.  - Heme/ Onc consulted  - Immunofixation, free light chains, SPEP, UPEP, Immunoglobulins, flow cytometry pending   - C/w asa and HSQ   - Pain control - standing Tylenol 975 TID, high-dose ibuprofen prn  - PT consulted  - Fall precautions

## 2018-04-18 NOTE — PROGRESS NOTE ADULT - ASSESSMENT
62year old with DJD, s/p trigger point injection, presented with back pain.    S/p laminectomy for epidural mass- with prelim path suggestive of plasmacytoid cells.    Await final path and cultures- seems less likely to be an infectious process    The risk / benefit of steroids need to be considered by the neurology team.      Monitor leukocytosis in post operative period- likely stress response.     Will follow intermittently call ID service with questions.

## 2018-04-18 NOTE — PHYSICAL THERAPY INITIAL EVALUATION ADULT - TRANSFER TRAINING, PT EVAL
GOAL: Pt will perform all transfers using appropriate assistive device indep in 2 weeks
GOAL: Pt will complete sit to stand transfer with independence in 2 weeks.

## 2018-04-18 NOTE — PHYSICAL THERAPY INITIAL EVALUATION ADULT - PERTINENT HX OF CURRENT PROBLEM, REHAB EVAL
61 y/o F PMHx significant for lumbar degenerative disc disease & fibromyalgia p/w chronic lumbago & progressively worsening LE weakness. Now w/ c/o inability to walk. MRI w/ severe spinal canal stenosis & abnormal enhancement of cauda equina concerning for phlegmon vs complex abscess, facetitis with possibilities of lymphoma, sarcoid, aracnoiditis on differential s/p partial L3 and L5 laminectomy and biopsy, concerning for MM in context of plasmacytoids noted on prelim bx read as well as CSF.

## 2018-04-18 NOTE — PROGRESS NOTE ADULT - PROBLEM SELECTOR PLAN 4
incidental finding of septal perforation. Concern for granulomatous disease however ACE wnl   - CT sinuses pending, based on those results will consider ENT consult

## 2018-04-18 NOTE — PROGRESS NOTE ADULT - SUBJECTIVE AND OBJECTIVE BOX
CONTACT INFO:  Aime Muñoz MD  PGY-1 | Internal Medicine  Spectra: [phone]    Patient is a 62y old  Female who presents with a chief complaint of CC: "I need an emergent MRI" (10 Apr 2018 10:20)      SUBJECTIVE / OVERNIGHT EVENTS: No overnight events. Patient well, says she just has some "soreness" at the operative site. No new complaints.   On tele:    REVIEW OF SYSTEMS:  14-point ROS was conducted with the patient and is negative except for those listed above.      MEDICATIONS  (STANDING):  aspirin  chewable 81 milliGRAM(s) Oral daily  ceFAZolin   IVPB 1000 milliGRAM(s) IV Intermittent every 8 hours  dextrose 5%. 1000 milliLiter(s) (50 mL/Hr) IV Continuous <Continuous>  dextrose 50% Injectable 12.5 Gram(s) IV Push once  dextrose 50% Injectable 25 Gram(s) IV Push once  dextrose 50% Injectable 25 Gram(s) IV Push once  docusate sodium 100 milliGRAM(s) Oral three times a day  heparin  Injectable 5000 Unit(s) SubCutaneous every 8 hours  influenza   Vaccine 0.5 milliLiter(s) IntraMuscular once  insulin lispro (HumaLOG) corrective regimen sliding scale   SubCutaneous three times a day before meals  pantoprazole    Tablet 40 milliGRAM(s) Oral daily  sodium chloride 0.9% with potassium chloride 20 mEq/L 1000 milliLiter(s) (75 mL/Hr) IV Continuous <Continuous>    MEDICATIONS  (PRN):  acetaminophen   Tablet 650 milliGRAM(s) Oral every 6 hours PRN For Temp greater than 38 C (100.4 F)  acetaminophen   Tablet. 975 milliGRAM(s) Oral every 6 hours PRN Mild Pain (1 - 3)  dextrose Gel 1 Dose(s) Oral once PRN Blood Glucose LESS THAN 70 milliGRAM(s)/deciliter  glucagon  Injectable 1 milliGRAM(s) IntraMuscular once PRN Glucose LESS THAN 70 milligrams/deciliter  haloperidol    Injectable 1 milliGRAM(s) IntraMuscular every 6 hours PRN Agitation  ondansetron Injectable 4 milliGRAM(s) IV Push every 6 hours PRN Nausea and/or Vomiting  oxyCODONE    5 mG/acetaminophen 325 mG 1 Tablet(s) Oral every 6 hours PRN Moderate Pain (4 - 6)  oxyCODONE    5 mG/acetaminophen 325 mG 2 Tablet(s) Oral every 6 hours PRN Severe Pain (7 - 10)  senna 2 Tablet(s) Oral at bedtime PRN Constipation      T(C): 36.9 (04-18-18 @ 08:04), Max: 37 (04-17-18 @ 15:00)  HR: 101 (04-18-18 @ 08:04) (77 - 116)  BP: 100/67 (04-18-18 @ 08:04) (96/57 - 139/86)  RR: 18 (04-18-18 @ 08:04) (15 - 18)  SpO2: 91% (04-18-18 @ 08:04) (90% - 100%)    PHYSICAL EXAM:  GENERAL: Laying in bed, in NAD  HEENT: Head NC/AT  NECK: No deformity, supple  CHEST/LUNG: CTABL, no wheezes  CARDIOVASCULAR: RRR, no murmur/rubs/gallops. No LE edema.  ABDOMEN: Soft, NT  BACK: Surgical site bandaged, SERJIO drain in place.  EXTREMITIES: No deformities. FROM x all 4 extremities.  NEURO: A/O x 3, alert this AM, otherwise non-focal neuro exam  PSYCH: Tired but cooperative    LABS:                        10.4   11.51 )-----------( 425      ( 17 Apr 2018 07:41 )             30.8     04-18    139  |  102  |  17  ----------------------------<  133<H>  4.3   |  25  |  0.61    Ca    9.4      18 Apr 2018 07:20  Phos  3.7     04-18  Mg     1.9     04-18    TPro  6.9  /  Alb  3.6  /  TBili  0.4  /  DBili  x   /  AST  12  /  ALT  20  /  AlkPhos  93  04-18    PT/INR - ( 17 Apr 2018 07:41 )   PT: 10.5 sec;   INR: 0.93 ratio         PTT - ( 17 Apr 2018 07:41 )  PTT:36.1 sec        I&O's Summary    17 Apr 2018 07:01  -  18 Apr 2018 07:00  --------------------------------------------------------  IN: 450 mL / OUT: 720 mL / NET: -270 mL        MICROBIOLOGY:    RADIOLOGY:

## 2018-04-18 NOTE — PHYSICAL THERAPY INITIAL EVALUATION ADULT - BALANCE TRAINING, PT EVAL
GOAL: Pt will demonstrate improved static/dynamic balance to _ in order to improve stability, decrease fall risk, and increase independence in ADLs within 4 weeks.
GOAL: Pt will improve static/dynamic sitting and standing balance by 1/2 grade to improve level of independence with mobility skills and ADLs in 2 weeks.

## 2018-04-18 NOTE — PROGRESS NOTE ADULT - SUBJECTIVE AND OBJECTIVE BOX
Follow Up:      Inverval History/ROS:Patient is a 62y old  Female who presents with a chief complaint of CC: "I need an emergent MRI" (10 Apr 2018 10:20)      S/p surgery.  Moving all ext.   no fever.      Allergies    No Known Allergies    Intolerances        ANTIMICROBIALS:      OTHER MEDS:  acetaminophen   Tablet 650 milliGRAM(s) Oral every 6 hours PRN  acetaminophen   Tablet. 975 milliGRAM(s) Oral every 12 hours  aspirin  chewable 81 milliGRAM(s) Oral daily  dextrose 5%. 1000 milliLiter(s) IV Continuous <Continuous>  dextrose 50% Injectable 12.5 Gram(s) IV Push once  dextrose 50% Injectable 25 Gram(s) IV Push once  dextrose 50% Injectable 25 Gram(s) IV Push once  dextrose Gel 1 Dose(s) Oral once PRN  docusate sodium 100 milliGRAM(s) Oral three times a day  glucagon  Injectable 1 milliGRAM(s) IntraMuscular once PRN  haloperidol    Injectable 1 milliGRAM(s) IntraMuscular every 6 hours PRN  heparin  Injectable 5000 Unit(s) SubCutaneous every 8 hours  ibuprofen  Tablet 600 milliGRAM(s) Oral every 6 hours PRN  influenza   Vaccine 0.5 milliLiter(s) IntraMuscular once  insulin lispro (HumaLOG) corrective regimen sliding scale   SubCutaneous three times a day before meals  ondansetron Injectable 4 milliGRAM(s) IV Push every 6 hours PRN  pantoprazole    Tablet 40 milliGRAM(s) Oral daily  senna 2 Tablet(s) Oral at bedtime PRN  sodium chloride 0.9% with potassium chloride 20 mEq/L 1000 milliLiter(s) IV Continuous <Continuous>      Vital Signs Last 24 Hrs  T(C): 36.3 (18 Apr 2018 16:17), Max: 36.9 (18 Apr 2018 08:04)  T(F): 97.4 (18 Apr 2018 16:17), Max: 98.5 (18 Apr 2018 08:04)  HR: 98 (18 Apr 2018 16:17) (84 - 116)  BP: 133/77 (18 Apr 2018 16:17) (100/67 - 139/86)  BP(mean): --  RR: 18 (18 Apr 2018 16:17) (18 - 18)  SpO2: 96% (18 Apr 2018 16:17) (90% - 96%)    PHYSICAL EXAM:  General: [x ] non-toxic  HEAD/EYES: [ ] PERRL [ x] white sclera [ ] icterus  ENT:  [ ] normal [x ] supple [ ] thrush [ ] pharyngeal exudate  Cardiovascular:   [ ] murmur [ x] normal [ ] PPM/AICD  Respiratory:  [ x] clear to ausculation bilaterally  GI:  [x ] soft, non-tender, normal bowel sounds  :  [ ] ryan [ ] no CVA tenderness   Musculoskeletal:  [ ] no synovitis  Neurologic:  [ ] non-focal exam   Skin:  [x ] no rash  Lymph: [ ] no lymphadenopathy  Psychiatric:  [x ] appropriate affect [ ] alert & oriented  Lines:  [x ] no phlebitis [ ] central line                                9.6    16.41 )-----------( 407      ( 18 Apr 2018 09:16 )             30.2       04-18    139  |  102  |  17  ----------------------------<  133<H>  4.3   |  25  |  0.61    Ca    9.4      18 Apr 2018 07:20  Phos  3.7     04-18  Mg     1.9     04-18    TPro  6.9  /  Alb  3.6  /  TBili  0.4  /  DBili  x   /  AST  12  /  ALT  20  /  AlkPhos  93  04-18          MICROBIOLOGY:Culture Results:   No growth (04-13-18 @ 15:22)  Culture Results:   No growth (04-12-18 @ 17:41)  Culture Results:   Testing in progress (04-12-18 @ 17:41)      RADIOLOGY:

## 2018-04-18 NOTE — PROGRESS NOTE ADULT - SUBJECTIVE AND OBJECTIVE BOX
Visit Summary: Patient seen and evaluated at bedside.    Overnight Events: none    Exam:  Vital Signs Last 24 Hrs  T(C): 36.8 (17 Apr 2018 20:53), Max: 37 (17 Apr 2018 15:00)  T(F): 98.3 (17 Apr 2018 20:53), Max: 98.6 (17 Apr 2018 15:00)  HR: 88 (17 Apr 2018 20:53) (77 - 116)  BP: 113/75 (17 Apr 2018 20:53) (96/57 - 139/86)  BP(mean): 72 (17 Apr 2018 14:30) (70 - 93)  RR: 18 (17 Apr 2018 20:53) (15 - 18)  SpO2: 93% (17 Apr 2018 20:53) (90% - 100%)    PHYSICAL EXAM:    Constitutional: No Acute Distress     Neurological: AOx3, Following Commands    Motor exam:          Upper extremity                         Delt     Bicep     Tricep    HG                                                 R         5/5        5/5        5/5       5/5                                               L          5/5        5/5        5/5       5/5          Lower extremity                        HF         KF        KE       DF         PF                                                  R        3/5 (pain)       5/5        5/5       2/5         2/5                                               L         3/5 (pain)       5/5       5/5       2/5          2/5                                                 Sensation: [x] intact to light touch  [] decreased:     No clonus

## 2018-04-19 DIAGNOSIS — I80.202 PHLEBITIS AND THROMBOPHLEBITIS OF UNSPECIFIED DEEP VESSELS OF LEFT LOWER EXTREMITY: ICD-10-CM

## 2018-04-19 LAB
BASOPHILS # BLD AUTO: 0.03 K/UL — SIGNIFICANT CHANGE UP (ref 0–0.2)
BASOPHILS NFR BLD AUTO: 0.2 % — SIGNIFICANT CHANGE UP (ref 0–2)
EOSINOPHIL # BLD AUTO: 0.17 K/UL — SIGNIFICANT CHANGE UP (ref 0–0.5)
EOSINOPHIL NFR BLD AUTO: 1.1 % — SIGNIFICANT CHANGE UP (ref 0–6)
HCT VFR BLD CALC: 27.3 % — LOW (ref 34.5–45)
HGB BLD-MCNC: 9.1 G/DL — LOW (ref 11.5–15.5)
IMM GRANULOCYTES NFR BLD AUTO: 0.2 % — SIGNIFICANT CHANGE UP (ref 0–1.5)
LYMPHOCYTES # BLD AUTO: 1.98 K/UL — SIGNIFICANT CHANGE UP (ref 1–3.3)
LYMPHOCYTES # BLD AUTO: 13.3 % — SIGNIFICANT CHANGE UP (ref 13–44)
MCHC RBC-ENTMCNC: 31.2 PG — SIGNIFICANT CHANGE UP (ref 27–34)
MCHC RBC-ENTMCNC: 33.3 GM/DL — SIGNIFICANT CHANGE UP (ref 32–36)
MCV RBC AUTO: 93.5 FL — SIGNIFICANT CHANGE UP (ref 80–100)
MONOCYTES # BLD AUTO: 1.46 K/UL — HIGH (ref 0–0.9)
MONOCYTES NFR BLD AUTO: 9.8 % — SIGNIFICANT CHANGE UP (ref 2–14)
NEUTROPHILS # BLD AUTO: 11.17 K/UL — HIGH (ref 1.8–7.4)
NEUTROPHILS NFR BLD AUTO: 75.4 % — SIGNIFICANT CHANGE UP (ref 43–77)
PLATELET # BLD AUTO: 387 K/UL — SIGNIFICANT CHANGE UP (ref 150–400)
RBC # BLD: 2.92 M/UL — LOW (ref 3.8–5.2)
RBC # FLD: 13.7 % — SIGNIFICANT CHANGE UP (ref 10.3–14.5)
WBC # BLD: 14.84 K/UL — HIGH (ref 3.8–10.5)
WBC # FLD AUTO: 14.84 K/UL — HIGH (ref 3.8–10.5)

## 2018-04-19 PROCEDURE — 93970 EXTREMITY STUDY: CPT | Mod: 26

## 2018-04-19 PROCEDURE — 99233 SBSQ HOSP IP/OBS HIGH 50: CPT | Mod: GC

## 2018-04-19 RX ORDER — INSULIN GLARGINE 100 [IU]/ML
3 INJECTION, SOLUTION SUBCUTANEOUS AT BEDTIME
Qty: 0 | Refills: 0 | Status: DISCONTINUED | OUTPATIENT
Start: 2018-04-19 | End: 2018-04-20

## 2018-04-19 RX ADMIN — OXYCODONE AND ACETAMINOPHEN 2 TABLET(S): 5; 325 TABLET ORAL at 00:03

## 2018-04-19 RX ADMIN — Medication 100 MILLIGRAM(S): at 13:03

## 2018-04-19 RX ADMIN — Medication 975 MILLIGRAM(S): at 17:14

## 2018-04-19 RX ADMIN — HEPARIN SODIUM 5000 UNIT(S): 5000 INJECTION INTRAVENOUS; SUBCUTANEOUS at 06:10

## 2018-04-19 RX ADMIN — Medication 975 MILLIGRAM(S): at 06:11

## 2018-04-19 RX ADMIN — Medication 975 MILLIGRAM(S): at 06:34

## 2018-04-19 RX ADMIN — Medication 975 MILLIGRAM(S): at 17:44

## 2018-04-19 RX ADMIN — OXYCODONE AND ACETAMINOPHEN 2 TABLET(S): 5; 325 TABLET ORAL at 00:30

## 2018-04-19 RX ADMIN — HEPARIN SODIUM 5000 UNIT(S): 5000 INJECTION INTRAVENOUS; SUBCUTANEOUS at 23:04

## 2018-04-19 RX ADMIN — HEPARIN SODIUM 5000 UNIT(S): 5000 INJECTION INTRAVENOUS; SUBCUTANEOUS at 13:03

## 2018-04-19 RX ADMIN — Medication 81 MILLIGRAM(S): at 11:55

## 2018-04-19 RX ADMIN — Medication 1: at 17:44

## 2018-04-19 RX ADMIN — Medication 600 MILLIGRAM(S): at 03:17

## 2018-04-19 RX ADMIN — OXYCODONE AND ACETAMINOPHEN 2 TABLET(S): 5; 325 TABLET ORAL at 11:55

## 2018-04-19 RX ADMIN — OXYCODONE AND ACETAMINOPHEN 2 TABLET(S): 5; 325 TABLET ORAL at 21:14

## 2018-04-19 RX ADMIN — Medication 600 MILLIGRAM(S): at 03:45

## 2018-04-19 RX ADMIN — PANTOPRAZOLE SODIUM 40 MILLIGRAM(S): 20 TABLET, DELAYED RELEASE ORAL at 11:55

## 2018-04-19 RX ADMIN — OXYCODONE AND ACETAMINOPHEN 2 TABLET(S): 5; 325 TABLET ORAL at 12:25

## 2018-04-19 RX ADMIN — Medication 3: at 08:20

## 2018-04-19 RX ADMIN — OXYCODONE AND ACETAMINOPHEN 2 TABLET(S): 5; 325 TABLET ORAL at 20:44

## 2018-04-19 RX ADMIN — INSULIN GLARGINE 3 UNIT(S): 100 INJECTION, SOLUTION SUBCUTANEOUS at 23:04

## 2018-04-19 NOTE — PROGRESS NOTE ADULT - ASSESSMENT
61 y/o F PMHx significant for lumbar degenerative disc disease and fibromyalgia p/w chronic lumbago as well as progressively worsening LE weakness and now with complaints of inability to walk.  MRI with severe spinal canal stenosis and abnormal enhancement of cauda equina concerning for phlegmon vs complex abscess, facetitis with possibilities of lymphoma, sarcoid, aracnoiditis on differential   s/p partial L3 and L5 laminectomy and biopsy, concerning for MM in context of plasmacytoids noted on prelim bx read as well as CSF.

## 2018-04-19 NOTE — PROVIDER CONTACT NOTE (OTHER) - ACTION/TREATMENT ORDERED:
MD Diaz aware that pt refused insulin coverage for fingerstick glucose of 165, will continue to monitor pt for s/s of hyper/hypo glycemia
Percocet to be reordered as per medicine team Brian, warm packs applied to back, pt is now calm, cooperative and back to previous personality
1:1 initiated. re-check vs when pt calm
Team 4 MD Up made aware order to be renew
Team 4 MD Up notified MD to increase dose of vanco to 1250mg

## 2018-04-19 NOTE — OCCUPATIONAL THERAPY INITIAL EVALUATION ADULT - PLANNED THERAPY INTERVENTIONS, OT EVAL
transfer training/ADL retraining/bed mobility training/strengthening/neuromuscular re-education/balance training

## 2018-04-19 NOTE — CONSULT NOTE ADULT - ASSESSMENT
62F a/w spinal mass now s/p resection w/ pending pathology, w/ spinal drain in place c/b soleal vein DVT. Given recent NSx and soleal vein DVT will hold off AC, not an indication for IVCF, will monitor w/ serial LE u/s for clot propagation.       - Repeat LE u/s in one week  - no further cardiac workup  - will follow as needed.   - d/w Dr. Whyte.

## 2018-04-19 NOTE — PROGRESS NOTE ADULT - ATTENDING COMMENTS
Isolated soleal DVT noted. Per Chest guidelines can do serial monitoring w/ U/S in 2 weeks if no risk factors for progression, hospital stay is itself risk for progression. D/w neurosurgery, cannot anticoagulate- contraindicated. Will ask Dr. Whyte to evaluate, don't think she should require IVC filter, would like to do serial monitoring though will get his input. Just incase NPO at midnight w/ pre procedure labs incase she goes for IVC filter. Otherwise, oxycodone on for pain is fine. Much improved this AM, need to d/w pathology, neurosurg and onc her ultimate dispo.

## 2018-04-19 NOTE — PROGRESS NOTE ADULT - SUBJECTIVE AND OBJECTIVE BOX
CONTACT INFO:  Aime Muñoz MD  PGY-1 | Internal Medicine  Spectra: [phone]    Patient is a 62y old  Female who presents with a chief complaint of CC: "I need an emergent MRI" (10 Apr 2018 10:20)      SUBJECTIVE / OVERNIGHT EVENTS: No acute overnight events. Patient required additional oxycodone for pain relief. No complaints this AM. Patient denies CP, SOB.  On tele:    REVIEW OF SYSTEMS:  14-point ROS was conducted with the patient and is negative except for those listed above.      MEDICATIONS  (STANDING):  acetaminophen   Tablet. 975 milliGRAM(s) Oral every 12 hours  aspirin  chewable 81 milliGRAM(s) Oral daily  dextrose 5%. 1000 milliLiter(s) (50 mL/Hr) IV Continuous <Continuous>  dextrose 50% Injectable 12.5 Gram(s) IV Push once  dextrose 50% Injectable 25 Gram(s) IV Push once  dextrose 50% Injectable 25 Gram(s) IV Push once  docusate sodium 100 milliGRAM(s) Oral three times a day  heparin  Injectable 5000 Unit(s) SubCutaneous every 8 hours  influenza   Vaccine 0.5 milliLiter(s) IntraMuscular once  insulin lispro (HumaLOG) corrective regimen sliding scale   SubCutaneous three times a day before meals  pantoprazole    Tablet 40 milliGRAM(s) Oral daily  sodium chloride 0.9% with potassium chloride 20 mEq/L 1000 milliLiter(s) (75 mL/Hr) IV Continuous <Continuous>    MEDICATIONS  (PRN):  acetaminophen   Tablet 650 milliGRAM(s) Oral every 6 hours PRN For Temp greater than 38 C (100.4 F)  dextrose Gel 1 Dose(s) Oral once PRN Blood Glucose LESS THAN 70 milliGRAM(s)/deciliter  glucagon  Injectable 1 milliGRAM(s) IntraMuscular once PRN Glucose LESS THAN 70 milligrams/deciliter  haloperidol    Injectable 1 milliGRAM(s) IntraMuscular every 6 hours PRN Agitation  ibuprofen  Tablet 600 milliGRAM(s) Oral every 6 hours PRN severe pain  ondansetron Injectable 4 milliGRAM(s) IV Push every 6 hours PRN Nausea and/or Vomiting  oxyCODONE    5 mG/acetaminophen 325 mG 2 Tablet(s) Oral every 6 hours PRN Severe Pain (7 - 10)  senna 2 Tablet(s) Oral at bedtime PRN Constipation      T(C): 36.8 (18 @ 05:51), Max: 36.9 (18 @ 08:04)  HR: 95 (18 @ 05:51) (95 - 112)  BP: 132/64 (18 @ 05:51) (100/67 - 144/82)  RR: 18 (18 @ 05:51) (18 - 18)  SpO2: 95% (18 @ 05:51) (91% - 96%)    PHYSICAL EXAM:  GENERAL: Laying in bed, in NAD  HEENT: Head NC/AT  NECK: No deformity, supple  CHEST/LUNG: CTABL, no wheezes  CARDIOVASCULAR: RRR, no murmur/rubs/gallops. No LE edema.  ABDOMEN: Soft, NT  BACK: Surgical site bandaged, SERJIO drain in place.  EXTREMITIES: No deformities. FROM x all 4 extremities.  NEURO: A/O x 3, alert this AM, otherwise non-focal neuro exam  PSYCH: Tired but cooperative    LABS:                        9.6    16.41 )-----------( 407      ( 2018 09:16 )             30.2     -    139  |  102  |  17  ----------------------------<  133<H>  4.3   |  25  |  0.61    Ca    9.4      2018 07:20  Phos  3.7       Mg     1.9         TPro  6.9  /  Alb  x   /  TBili  x   /  DBili  x   /  AST  x   /  ALT  x   /  AlkPhos  x               I&O's Summary    2018 07:01  -  2018 07:00  --------------------------------------------------------  IN: 2360 mL / OUT: 3205 mL / NET: -845 mL        MICROBIOLOGY:  Immunoglobulins Panel (18 @ 12:55)    Quantitative Ig mg/dL    Quantitative IgA: 302 mg/dL    Quantitative IgM: 102 mg/dL    [place]/Lambda Free Light Chain Ratio, Serum: 0.90 Ratio    Kappa/Lambda Free Light Chain Ratio, Serum: 0.90 Ratio (04.18.18 @ 12:55)    LUCRECIA Kappa: 1.22 mg/dL (04.18.18 @ 12:55)    LUCRECIA Lambda: 1.36 mg/dL (04.18.18 @ 12:55)        RADIOLOGY: CONTACT INFO:  Aime Muñoz MD  PGY-1 | Internal Medicine  Spectra: [phone]    Patient is a 62y old  Female who presents with a chief complaint of CC: "I need an emergent MRI" (10 Apr 2018 10:20)      SUBJECTIVE / OVERNIGHT EVENTS: No acute overnight events. Patient required additional oxycodone for pain relief. Patient denies CP, SOB. Appears comfortable, just c/o mild "tightness" around operative site. No low back pain or sciatica.   On tele:    REVIEW OF SYSTEMS:  14-point ROS was conducted with the patient and is negative except for those listed above.      MEDICATIONS  (STANDING):  acetaminophen   Tablet. 975 milliGRAM(s) Oral every 12 hours  aspirin  chewable 81 milliGRAM(s) Oral daily  dextrose 5%. 1000 milliLiter(s) (50 mL/Hr) IV Continuous <Continuous>  dextrose 50% Injectable 12.5 Gram(s) IV Push once  dextrose 50% Injectable 25 Gram(s) IV Push once  dextrose 50% Injectable 25 Gram(s) IV Push once  docusate sodium 100 milliGRAM(s) Oral three times a day  heparin  Injectable 5000 Unit(s) SubCutaneous every 8 hours  influenza   Vaccine 0.5 milliLiter(s) IntraMuscular once  insulin lispro (HumaLOG) corrective regimen sliding scale   SubCutaneous three times a day before meals  pantoprazole    Tablet 40 milliGRAM(s) Oral daily  sodium chloride 0.9% with potassium chloride 20 mEq/L 1000 milliLiter(s) (75 mL/Hr) IV Continuous <Continuous>    MEDICATIONS  (PRN):  acetaminophen   Tablet 650 milliGRAM(s) Oral every 6 hours PRN For Temp greater than 38 C (100.4 F)  dextrose Gel 1 Dose(s) Oral once PRN Blood Glucose LESS THAN 70 milliGRAM(s)/deciliter  glucagon  Injectable 1 milliGRAM(s) IntraMuscular once PRN Glucose LESS THAN 70 milligrams/deciliter  haloperidol    Injectable 1 milliGRAM(s) IntraMuscular every 6 hours PRN Agitation  ibuprofen  Tablet 600 milliGRAM(s) Oral every 6 hours PRN severe pain  ondansetron Injectable 4 milliGRAM(s) IV Push every 6 hours PRN Nausea and/or Vomiting  oxyCODONE    5 mG/acetaminophen 325 mG 2 Tablet(s) Oral every 6 hours PRN Severe Pain (7 - 10)  senna 2 Tablet(s) Oral at bedtime PRN Constipation      T(C): 36.8 (18 @ 05:51), Max: 36.9 (18 @ 08:04)  HR: 95 (18 @ 05:51) (95 - 112)  BP: 132/64 (18 @ 05:51) (100/67 - 144/82)  RR: 18 (18 @ 05:51) (18 - 18)  SpO2: 95% (18 @ 05:51) (91% - 96%)    PHYSICAL EXAM:  GENERAL: Laying in bed, in NAD  HEENT: Head NC/AT  NECK: No deformity, supple  CHEST/LUNG: CTABL, no wheezes  CARDIOVASCULAR: RRR, no murmur/rubs/gallops. No LE edema.  ABDOMEN: Soft, NT  BACK: Stapes overlying surgical site, bandaged, SERJIO drain in place. C/D/I  EXTREMITIES: No deformities. FROM x all 4 extremities.  NEURO: A/O x 3, alert this AM, otherwise non-focal neuro exam  PSYCH: Tired but cooperative    LABS:                        9.6    16.41 )-----------( 407      ( 2018 09:16 )             30.2         139  |  102  |  17  ----------------------------<  133<H>  4.3   |  25  |  0.61    Ca    9.4      2018 07:20  Phos  3.7       Mg     1.9         TPro  6.9  /  Alb  x   /  TBili  x   /  DBili  x   /  AST  x   /  ALT  x   /  AlkPhos  x               I&O's Summary    2018 07:01  -  2018 07:00  --------------------------------------------------------  IN: 2360 mL / OUT: 3205 mL / NET: -845 mL        MICROBIOLOGY:  Immunoglobulins Panel (18 @ 12:55)    Quantitative Ig mg/dL    Quantitative IgA: 302 mg/dL    Quantitative IgM: 102 mg/dL    [place]/Lambda Free Light Chain Ratio, Serum: 0.90 Ratio    Kappa/Lambda Free Light Chain Ratio, Serum: 0.90 Ratio (.18.18 @ 12:55)    LUCRECIA Kappa: 1.22 mg/dL (04.18.18 @ 12:55)    LUCRECIA Lambda: 1.36 mg/dL (.18.18 @ 12:55)        RADIOLOGY: CONTACT INFO:  Aime Muñoz MD  PGY-1 | Internal Medicine  Spectra: [phone]    Patient is a 62y old  Female who presents with a chief complaint of CC: "I need an emergent MRI" (10 Apr 2018 10:20)      SUBJECTIVE / OVERNIGHT EVENTS: No acute overnight events. Patient required additional oxycodone for pain relief. Patient denies CP, SOB. Appears comfortable, just c/o mild "tightness" around operative site. No low back pain or sciatica. Patient found to have new L soleal DVT on duplex.  On tele:    REVIEW OF SYSTEMS:  14-point ROS was conducted with the patient and is negative except for those listed above.      MEDICATIONS  (STANDING):  acetaminophen   Tablet. 975 milliGRAM(s) Oral every 12 hours  aspirin  chewable 81 milliGRAM(s) Oral daily  dextrose 5%. 1000 milliLiter(s) (50 mL/Hr) IV Continuous <Continuous>  dextrose 50% Injectable 12.5 Gram(s) IV Push once  dextrose 50% Injectable 25 Gram(s) IV Push once  dextrose 50% Injectable 25 Gram(s) IV Push once  docusate sodium 100 milliGRAM(s) Oral three times a day  heparin  Injectable 5000 Unit(s) SubCutaneous every 8 hours  influenza   Vaccine 0.5 milliLiter(s) IntraMuscular once  insulin lispro (HumaLOG) corrective regimen sliding scale   SubCutaneous three times a day before meals  pantoprazole    Tablet 40 milliGRAM(s) Oral daily  sodium chloride 0.9% with potassium chloride 20 mEq/L 1000 milliLiter(s) (75 mL/Hr) IV Continuous <Continuous>    MEDICATIONS  (PRN):  acetaminophen   Tablet 650 milliGRAM(s) Oral every 6 hours PRN For Temp greater than 38 C (100.4 F)  dextrose Gel 1 Dose(s) Oral once PRN Blood Glucose LESS THAN 70 milliGRAM(s)/deciliter  glucagon  Injectable 1 milliGRAM(s) IntraMuscular once PRN Glucose LESS THAN 70 milligrams/deciliter  haloperidol    Injectable 1 milliGRAM(s) IntraMuscular every 6 hours PRN Agitation  ibuprofen  Tablet 600 milliGRAM(s) Oral every 6 hours PRN severe pain  ondansetron Injectable 4 milliGRAM(s) IV Push every 6 hours PRN Nausea and/or Vomiting  oxyCODONE    5 mG/acetaminophen 325 mG 2 Tablet(s) Oral every 6 hours PRN Severe Pain (7 - 10)  senna 2 Tablet(s) Oral at bedtime PRN Constipation      T(C): 36.8 (18 @ 05:51), Max: 36.9 (18 @ 08:04)  HR: 95 (18 @ 05:51) (95 - 112)  BP: 132/64 (18 @ 05:51) (100/67 - 144/82)  RR: 18 (18 @ 05:51) (18 - 18)  SpO2: 95% (18 @ 05:51) (91% - 96%)    PHYSICAL EXAM:  GENERAL: Laying in bed, in NAD  HEENT: Head NC/AT  NECK: No deformity, supple  CHEST/LUNG: CTABL, no wheezes  CARDIOVASCULAR: RRR, no murmur/rubs/gallops. No LE edema.  ABDOMEN: Soft, NT  BACK: Stapes overlying surgical site, bandaged, SERJIO drain in place. C/D/I  EXTREMITIES: No deformities. FROM x all 4 extremities.  NEURO: A/O x 3, alert this AM, otherwise non-focal neuro exam  PSYCH: Tired but cooperative    LABS:                        9.6    16.41 )-----------( 407      ( 2018 09:16 )             30.2         139  |  102  |  17  ----------------------------<  133<H>  4.3   |  25  |  0.61    Ca    9.4      2018 07:20  Phos  3.7       Mg     1.9         TPro  6.9  /  Alb  x   /  TBili  x   /  DBili  x   /  AST  x   /  ALT  x   /  AlkPhos  x               I&O's Summary    2018 07:01  -  2018 07:00  --------------------------------------------------------  IN: 2360 mL / OUT: 3205 mL / NET: -845 mL        MICROBIOLOGY:  Immunoglobulins Panel (18 @ 12:55)    Quantitative Ig mg/dL    Quantitative IgA: 302 mg/dL    Quantitative IgM: 102 mg/dL    [place]/Lambda Free Light Chain Ratio, Serum: 0.90 Ratio    Kappa/Lambda Free Light Chain Ratio, Serum: 0.90 Ratio (18 @ 12:55)    LUCRECIA Kappa: 1.22 mg/dL (18 @ 12:55)    LUCRECIA Lambda: 1.36 mg/dL (18 @ 12:55)        RADIOLOGY:  < from: [facility] Duplex Lower Ext Vein Scan, Bilat (18 @ 11:25) >  IMPRESSION:     Acute, below the knee, DVT of the left soleal vein. No evidence of right   lower extremity deep venous thrombosis.Dr. Nguyễn was informed of   the findings following the study on 2018.      < end of copied text >

## 2018-04-19 NOTE — OCCUPATIONAL THERAPY INITIAL EVALUATION ADULT - GENERAL OBSERVATIONS, REHAB EVAL
Received seated at edge of bed, +hemovac, +IV, reported back soreness, no pain; agreeable to PT Received seated at edge of bed, +hemovac, +IVL

## 2018-04-19 NOTE — PROGRESS NOTE ADULT - PROBLEM SELECTOR PLAN 2
CSF negative for infection   - Neurology team recommends prednisone 60mg and taper over 6 days however given concern for infection, steroids contraindicated.   - As per neurology team, the steroids are for symptomatic treatment  - Hold steroids for now, patient's pain generally well-controlled

## 2018-04-19 NOTE — PROGRESS NOTE ADULT - PROBLEM SELECTOR PLAN 6
#DVT PPx: HSQ  #Diet: CC  #PT following - L soleal DVT noted on duplex  - Will c/w HSQ, hold therapeutic anticoagulation at this time as there is low risk this infrapopliteal DVT will evolve into a proximal DVT/PE  - Continue to monitor patient for new LE pain, swelling, erythema - L soleal DVT noted on duplex  - may need IVC filter vs. repeat U/S, see addendum.   - Continue to monitor patient for new LE pain, swelling, erythema

## 2018-04-19 NOTE — PROGRESS NOTE ADULT - PROBLEM SELECTOR PLAN 1
MR lumbar spine suspicious for phlegmon w/ arachnoiditis vs malignancy   s/p partial L3 and L5 laminectomy w/ biopsy today. Initial read by pathology team: plasmacytoid cells concerning for multiple myeloma, now also noted in CSF.  - Heme/ Onc consulted  - MM w/u so far unremarkable -SPEP, LUCRECIA, quantitative immunoglobulins, light chains --> ?plasmacytoma. F/u heme/onc.  - C/w asa and HSQ   - Pain control - standing Tylenol 975 TID, high-dose ibuprofen prn. Consider oxycodone prn for breakthrough.  - PT consulted  - Fall precautions

## 2018-04-19 NOTE — CONSULT NOTE ADULT - SUBJECTIVE AND OBJECTIVE BOX
Chief Complaint: " I needed an MRI"    HPI: 62F poor historian PMHx Herniated disc, Fibromyalgia, Spinal Stenosis p/w "I needed an MRI" a/w cervical/lumbar disc bulging. Pt now s/p L4/L5 laminectomy c/b DVt. Pt denies BLE swelling or pain on walking prior to admission. Pt reports +Family hx of DVT in her daughters.  Denies CP/SOb/n/v/d/ha/fev/chills.       PMH:   Herniated disc, cervical  Spinal stenosis  Fibromyalgia    PSH:   No significant past surgical history    Medications:   acetaminophen   Tablet 650 milliGRAM(s) Oral every 6 hours PRN  acetaminophen   Tablet. 975 milliGRAM(s) Oral every 12 hours  aspirin  chewable 81 milliGRAM(s) Oral daily  dextrose 5%. 1000 milliLiter(s) IV Continuous <Continuous>  dextrose 50% Injectable 12.5 Gram(s) IV Push once  dextrose 50% Injectable 25 Gram(s) IV Push once  dextrose 50% Injectable 25 Gram(s) IV Push once  dextrose Gel 1 Dose(s) Oral once PRN  docusate sodium 100 milliGRAM(s) Oral three times a day  glucagon  Injectable 1 milliGRAM(s) IntraMuscular once PRN  haloperidol    Injectable 1 milliGRAM(s) IntraMuscular every 6 hours PRN  heparin  Injectable 5000 Unit(s) SubCutaneous every 8 hours  ibuprofen  Tablet 600 milliGRAM(s) Oral every 6 hours PRN  influenza   Vaccine 0.5 milliLiter(s) IntraMuscular once  insulin glargine Injectable (LANTUS) 3 Unit(s) SubCutaneous at bedtime  insulin lispro (HumaLOG) corrective regimen sliding scale   SubCutaneous three times a day before meals  ondansetron Injectable 4 milliGRAM(s) IV Push every 6 hours PRN  oxyCODONE    5 mG/acetaminophen 325 mG 2 Tablet(s) Oral every 6 hours PRN  pantoprazole    Tablet 40 milliGRAM(s) Oral daily  senna 2 Tablet(s) Oral at bedtime PRN  sodium chloride 0.9% with potassium chloride 20 mEq/L 1000 milliLiter(s) IV Continuous <Continuous>    Home Medications:  Home Medications:    Allergies:  No Known Allergies    Family Hx:  FAMILY HISTORY:  No pertinent family history in first degree relatives    Social History:  Smoking: Smokes Every Day  Alcohol: occasional  Drugs: denies    Review of Systems:  Constitutional: [ ] Fever [ ] Chills [ ] Fatigue [ ] Weight change   HEENT: [ ] Blurred vision [ ] Eye Pain [ ] Headache [ ] Runny nose [ ] Sore Throat   Respiratory: [ ] Cough [ ] Wheezing [ ] Shortness of breath  Cardiovascular: [ ] Chest Pain [ ] Palpitations [ ] MAHMOOD [ ] PND [ ] Orthopnea  Gastrointestinal: [ ] Abdominal Pain [ ] Diarrhea [ ] Constipation [ ] Hemorrhoids [ ] Nausea [ ] Vomiting  Genitourinary: [ ] Nocturia [ ] Dysuria [ ] Incontinence  Extremities: [ ] Swelling [ ] Joint Pain  Neurologic: [ ] Focal deficit [ ] Paresthesias [ ] Syncope  Lymphatic: [ ] Swelling [ ] Lymphadenopathy   Skin: [ ] Rash [ ] Ecchymoses [ ] Wounds [ ] Lesions  Psychiatry: [ ] Depression [ ] Suicidal/Homicidal Ideation [ ] Anxiety [ ] Sleep Disturbances  [x ] 10 point review of systems is otherwise negative except as mentioned above            [ ]Unable to obtain    Physical Exam:  T(F): 97.7 (04-19-18 @ 08:34), Max: 98.5 (04-18-18 @ 20:31)  HR: 94 (04-19-18 @ 08:34) (94 - 110)  BP: 129/73 (04-19-18 @ 08:34) (117/75 - 144/82)  RR: 18 (04-19-18 @ 08:34) (18 - 18)  SpO2: 98% (04-19-18 @ 08:34) (95% - 98%)    04-18 @ 07:01  -  04-19 @ 07:00  --------------------------------------------------------  IN: 2360 mL / OUT: 3205 mL / NET: -845 mL    04-19 @ 07:01  -  04-19 @ 17:12  --------------------------------------------------------  IN: 780 mL / OUT: 0 mL / NET: 780 mL      Daily     Daily     Appearance: [x ] Normal [x ] NAD  Eyes: [ x] PERRL [x ] EOMI  HENT: [x ] Normal oral muscosa [ ]NC/AT  Cardiovascular: [ x] S1 [x ] S2 [ x] RRR [x ] No m/r/g, +3 BLE edema [ x] no JVP  Respiratory: [x ] Clear to auscultation bilaterally  Gastrointestinal: [x ] Soft [x ] Non-tender [x ] Non-distended [x ] BS+  Musculoskeletal: [x ] No clubbing [x ] No joint deformity, +BL DP/PT/Popliteal pulses.  Neurologic: [x ] Non-focal grossly  Psychiatry: [x ] AAOx3 [x ] Mood & affect appropriate  Skin: [x ] No rashes [x ] No ecchymoses [x ] No cyanosis    Labs:                        9.1    14.84 )-----------( 387      ( 19 Apr 2018 09:39 )             27.3     04-18    139  |  102  |  17  ----------------------------<  133<H>  4.3   |  25  |  0.61    Ca    9.4      18 Apr 2018 07:20  Phos  3.7     04-18  Mg     1.9     04-18    TPro  6.9  /  Alb  x   /  TBili  x   /  DBili  x   /  AST  x   /  ALT  x   /  AlkPhos  x   04-18      Hemoglobin A1C, Whole Blood: 7.5 % (04-14 @ 07:04)      ECG: no EKG in chart    Echo:  < from: Transthoracic Echocardiogram (04.12.18 @ 20:16) >  Conclusions:  1. Endocardium not well visualized; grossly normal left  ventricular systolic function.  2. Normal right ventricular size and function.  3. Pulmonic valve not well visualized, probably normal. No  pulmonic regurgitation.  Unable to rule out endocarditis, consider MELCHOR if clinically  indicated.  *** No previous Echo exam.  ------------------------------------------------------------------------  Confirmed on  4/13/2018 - 09:14:23 by Robbi Kingston M.D.  ------------------------------------------------------------------------    < end of copied text >      Interpretation of Telemetry: not on tele    Imaging:  < from: [facility] Duplex Lower Ext Vein Scan, Bilat (04.19.18 @ 11:25) >  IMPRESSION:     Acute, below the knee, DVT of the left soleal vein. No evidence of right   lower extremity deep venous thrombosis.Dr. Nguyễn was informed of   the findings following the study on 4/19/2018.    DAPHNEY BOWSER M.D., ATTENDING RADIOLOGIST  This document has been electronically signed. Apr 19 2018 11:42AM        < end of copied text >      < from: MR Cervical Spine w/wo IV Cont (04.11.18 @ 21:03) >  IMPRESSION:  There is no cord compression along the cervical or thoracic spine. The   spinal cord demonstrates normal course and caliber.    There is abnormal enhancement involving the entire cauda equina extending   to the ventral and dorsal aspects of the lower thoracic cord. There is   abnormal epidural complex possibly multiloculated abscess versus phlegmon   centered at the left L4-L5 facet joint, possibly involving the L5-S1   facet.  Severe spinal canal stenosis with compression of all descending   enhancing nerves at L4-L5 isseen. There is abnormal enhancement of the   intervertebral disc bordering this process at L4-L5 and L5-S1.    The constellation of these findings is most consistent with left L4-L5,   possibly L5-S1 facetitis with resulting epidural phlegmon versus complex   abscess spanning L3-L5 and arachnoiditis involving the cauda equina. The   differential diagnosis of sarcoidosis versus lymphoma are not entirely   excluded; please correlate with patient history, and clinical and   laboratory exam findings.    These findings were discussed with Dr. MOLINA at 4/12/2018 9:45 AM   by Dr. Hodges.           ***Please see the addendum at the top of this report. It may contain   additional important information or changes.****    < end of copied text >

## 2018-04-19 NOTE — OCCUPATIONAL THERAPY INITIAL EVALUATION ADULT - ADDITIONAL COMMENTS
4/17 s/p L3-5 lami resection of epidural mass  MR L Spine 4/11 There is no cord compression along the cervical or thoracic spine.There is abnormal enhancement involving the entire cauda equina extending to the ventral and dorsal aspects of the lower thoracic cord. There is abnormal epidural complex possibly multiloculated abscess versus phlegmon centered at the left L4-L5 facet joint, possibly involving the L5-S1 facet.  Severe spinal canal stenosis with compression of all descending enhancing nerves at L4-L5 is seen. There is abnormal enhancement of the intervertebral disc bordering this process at L4-L5 and L5-S1.

## 2018-04-19 NOTE — CONSULT NOTE ADULT - ATTENDING COMMENTS
62 year old with DJD, s/p trigger point injection, presented with back pain.    Imaging raises concern for discitis/ OM with epidural phlegmon. Also ? subarachnoiditis.    If LP done, check cell count, glucose, protein, csf pcr, routine culture, fungal culture, AFB, cytology/ flow    If LP is not diagnostic, may need to consider IR guided vs open biopsy to establish a diagnosis.    Neuro checks.     I would hold off on empiric antibiotics at this point.  the differential diagnosis remains broad and included infection as well as non-infectious process (malignancy) .  I think a tissue diagnosis to determine if antimicrobials are needed and to help choose the appropriate antimicrobial.      Check HIV status
Patient seen and examined  she has below the knee DVT which is asymptomatic.  High risk for A/C  No role for IVC filter  she needs a repeat duplex in 1 week to assess for propagation - she can see me in the office in 1 week and will be happy to arrange this.      Thanks    Zane Whyte  Vascular Cardiology [id number]
Pt with abnl enhancement on MRI spine   prelim bx results pending ?plasma cells - clinical picture suggest infectious process  await final path dx  will f/u spep and krystal  further reccs pending path results.

## 2018-04-19 NOTE — PROVIDER CONTACT NOTE (OTHER) - ASSESSMENT
pt alert and oriented times three
, /89, temp 97.9 axially, and resp 20 at 94% on room air.
BHARATH pt on 1;1 for safety, pt is being followed by psych while here, pt has been laughing, carrying on and having a great night with roommate and roommates family member since 1900, RN was just in room and patient was blowing kisses and saying how much she loved RN, upon exiting room to see what pain medication she is ordered, patient began screaming on top of lungs, carrying on, stating she will be continuing this behavior until she gets exactly what she wants, very rude and nasty comments being said to staff, behavior and personality appeared to completely change drastically
Pt VSS, pt a and ox4, pt forgetful at times, pt agitated, pt trying to get OOB without assistance
again. Security notified to call admin before allowing pt back in the building.
pt VSS, pt agitated at times, pt a and o x4

## 2018-04-19 NOTE — OCCUPATIONAL THERAPY INITIAL EVALUATION ADULT - PERTINENT HX OF CURRENT PROBLEM, REHAB EVAL
63 y/o F PMHx significant for lumbar degenerative disc disease & fibromyalgia p/w chronic lumbago & progressively worsening LE weakness. Now w/ c/o inability to walk. MRI w/ severe spinal canal stenosis & abnormal enhancement of cauda equina concerning for phlegmon vs complex abscess, facetitis with possibilities of lymphoma, sarcoid, aracnoiditis on differential s/p partial L3 and L5 laminectomy and biopsy, concerning for MM in context of plasmacytoids noted on prelim bx read as well as CSF.

## 2018-04-19 NOTE — PROVIDER CONTACT NOTE (CRITICAL VALUE NOTIFICATION) - ACTION/TREATMENT ORDERED:
No other treatments or reorder at this time, Med team marita will inform med team if needed to reorder in morning, will cont to monitor

## 2018-04-20 ENCOUNTER — TRANSCRIPTION ENCOUNTER (OUTPATIENT)
Age: 63
End: 2018-04-20

## 2018-04-20 VITALS
SYSTOLIC BLOOD PRESSURE: 145 MMHG | HEART RATE: 101 BPM | OXYGEN SATURATION: 95 % | TEMPERATURE: 98 F | RESPIRATION RATE: 18 BRPM | DIASTOLIC BLOOD PRESSURE: 81 MMHG

## 2018-04-20 LAB — CALCIUM UR-MCNC: 7 MG/DL — SIGNIFICANT CHANGE UP

## 2018-04-20 PROCEDURE — 86403 PARTICLE AGGLUT ANTBDY SCRN: CPT

## 2018-04-20 PROCEDURE — 72131 CT LUMBAR SPINE W/O DYE: CPT

## 2018-04-20 PROCEDURE — 88342 IMHCHEM/IMCYTCHM 1ST ANTB: CPT

## 2018-04-20 PROCEDURE — A9585: CPT

## 2018-04-20 PROCEDURE — 97530 THERAPEUTIC ACTIVITIES: CPT

## 2018-04-20 PROCEDURE — 99285 EMERGENCY DEPT VISIT HI MDM: CPT | Mod: 25

## 2018-04-20 PROCEDURE — 72158 MRI LUMBAR SPINE W/O & W/DYE: CPT

## 2018-04-20 PROCEDURE — 97110 THERAPEUTIC EXERCISES: CPT

## 2018-04-20 PROCEDURE — 83516 IMMUNOASSAY NONANTIBODY: CPT

## 2018-04-20 PROCEDURE — 82945 GLUCOSE OTHER FLUID: CPT

## 2018-04-20 PROCEDURE — 71250 CT THORAX DX C-: CPT

## 2018-04-20 PROCEDURE — 87476 LYME DIS DNA AMP PROBE: CPT

## 2018-04-20 PROCEDURE — 93970 EXTREMITY STUDY: CPT

## 2018-04-20 PROCEDURE — 70498 CT ANGIOGRAPHY NECK: CPT

## 2018-04-20 PROCEDURE — 72156 MRI NECK SPINE W/O & W/DYE: CPT

## 2018-04-20 PROCEDURE — 82784 ASSAY IGA/IGD/IGG/IGM EACH: CPT

## 2018-04-20 PROCEDURE — 96374 THER/PROPH/DIAG INJ IV PUSH: CPT

## 2018-04-20 PROCEDURE — 99254 IP/OBS CNSLTJ NEW/EST MOD 60: CPT

## 2018-04-20 PROCEDURE — 85652 RBC SED RATE AUTOMATED: CPT

## 2018-04-20 PROCEDURE — 86140 C-REACTIVE PROTEIN: CPT

## 2018-04-20 PROCEDURE — 80053 COMPREHEN METABOLIC PANEL: CPT

## 2018-04-20 PROCEDURE — 84156 ASSAY OF PROTEIN URINE: CPT

## 2018-04-20 PROCEDURE — 99232 SBSQ HOSP IP/OBS MODERATE 35: CPT

## 2018-04-20 PROCEDURE — 82340 ASSAY OF CALCIUM IN URINE: CPT

## 2018-04-20 PROCEDURE — 81001 URINALYSIS AUTO W/SCOPE: CPT

## 2018-04-20 PROCEDURE — 70553 MRI BRAIN STEM W/O & W/DYE: CPT

## 2018-04-20 PROCEDURE — 84100 ASSAY OF PHOSPHORUS: CPT

## 2018-04-20 PROCEDURE — 70496 CT ANGIOGRAPHY HEAD: CPT

## 2018-04-20 PROCEDURE — 86900 BLOOD TYPING SEROLOGIC ABO: CPT

## 2018-04-20 PROCEDURE — 86592 SYPHILIS TEST NON-TREP QUAL: CPT

## 2018-04-20 PROCEDURE — 87075 CULTR BACTERIA EXCEPT BLOOD: CPT

## 2018-04-20 PROCEDURE — 96375 TX/PRO/DX INJ NEW DRUG ADDON: CPT

## 2018-04-20 PROCEDURE — 85027 COMPLETE CBC AUTOMATED: CPT

## 2018-04-20 PROCEDURE — 84157 ASSAY OF PROTEIN OTHER: CPT

## 2018-04-20 PROCEDURE — 85610 PROTHROMBIN TIME: CPT

## 2018-04-20 PROCEDURE — 84443 ASSAY THYROID STIM HORMONE: CPT

## 2018-04-20 PROCEDURE — 84155 ASSAY OF PROTEIN SERUM: CPT

## 2018-04-20 PROCEDURE — 83735 ASSAY OF MAGNESIUM: CPT

## 2018-04-20 PROCEDURE — 72125 CT NECK SPINE W/O DYE: CPT

## 2018-04-20 PROCEDURE — 86901 BLOOD TYPING SEROLOGIC RH(D): CPT

## 2018-04-20 PROCEDURE — 86480 TB TEST CELL IMMUN MEASURE: CPT

## 2018-04-20 PROCEDURE — 97164 PT RE-EVAL EST PLAN CARE: CPT

## 2018-04-20 PROCEDURE — 87040 BLOOD CULTURE FOR BACTERIA: CPT

## 2018-04-20 PROCEDURE — 86334 IMMUNOFIX E-PHORESIS SERUM: CPT

## 2018-04-20 PROCEDURE — 83036 HEMOGLOBIN GLYCOSYLATED A1C: CPT

## 2018-04-20 PROCEDURE — 86038 ANTINUCLEAR ANTIBODIES: CPT

## 2018-04-20 PROCEDURE — 83615 LACTATE (LD) (LDH) ENZYME: CPT

## 2018-04-20 PROCEDURE — 72157 MRI CHEST SPINE W/O & W/DYE: CPT

## 2018-04-20 PROCEDURE — 86036 ANCA SCREEN EACH ANTIBODY: CPT

## 2018-04-20 PROCEDURE — 97116 GAIT TRAINING THERAPY: CPT

## 2018-04-20 PROCEDURE — 88184 FLOWCYTOMETRY/ TC 1 MARKER: CPT

## 2018-04-20 PROCEDURE — 62270 DX LMBR SPI PNXR: CPT

## 2018-04-20 PROCEDURE — 88341 IMHCHEM/IMCYTCHM EA ADD ANTB: CPT

## 2018-04-20 PROCEDURE — 77003 FLUOROGUIDE FOR SPINE INJECT: CPT

## 2018-04-20 PROCEDURE — 80061 LIPID PANEL: CPT

## 2018-04-20 PROCEDURE — 82607 VITAMIN B-12: CPT

## 2018-04-20 PROCEDURE — 85730 THROMBOPLASTIN TIME PARTIAL: CPT

## 2018-04-20 PROCEDURE — 87086 URINE CULTURE/COLONY COUNT: CPT

## 2018-04-20 PROCEDURE — 86335 IMMUNFIX E-PHORSIS/URINE/CSF: CPT

## 2018-04-20 PROCEDURE — 86789 WEST NILE VIRUS ANTIBODY: CPT

## 2018-04-20 PROCEDURE — 88307 TISSUE EXAM BY PATHOLOGIST: CPT

## 2018-04-20 PROCEDURE — 89051 BODY FLUID CELL COUNT: CPT

## 2018-04-20 PROCEDURE — 76000 FLUOROSCOPY <1 HR PHYS/QHP: CPT

## 2018-04-20 PROCEDURE — 87102 FUNGUS ISOLATION CULTURE: CPT

## 2018-04-20 PROCEDURE — 71045 X-RAY EXAM CHEST 1 VIEW: CPT

## 2018-04-20 PROCEDURE — 93306 TTE W/DOPPLER COMPLETE: CPT

## 2018-04-20 PROCEDURE — 86780 TREPONEMA PALLIDUM: CPT

## 2018-04-20 PROCEDURE — 87389 HIV-1 AG W/HIV-1&-2 AB AG IA: CPT

## 2018-04-20 PROCEDURE — 87483 CNS DNA AMP PROBE TYPE 12-25: CPT

## 2018-04-20 PROCEDURE — 82962 GLUCOSE BLOOD TEST: CPT

## 2018-04-20 PROCEDURE — 88312 SPECIAL STAINS GROUP 1: CPT

## 2018-04-20 PROCEDURE — 83521 IG LIGHT CHAINS FREE EACH: CPT

## 2018-04-20 PROCEDURE — 87070 CULTURE OTHR SPECIMN AEROBIC: CPT

## 2018-04-20 PROCEDURE — 86850 RBC ANTIBODY SCREEN: CPT

## 2018-04-20 PROCEDURE — 87205 SMEAR GRAM STAIN: CPT

## 2018-04-20 PROCEDURE — 82164 ANGIOTENSIN I ENZYME TEST: CPT

## 2018-04-20 PROCEDURE — 88365 INSITU HYBRIDIZATION (FISH): CPT

## 2018-04-20 PROCEDURE — C1889: CPT

## 2018-04-20 PROCEDURE — 88185 FLOWCYTOMETRY/TC ADD-ON: CPT

## 2018-04-20 PROCEDURE — 80202 ASSAY OF VANCOMYCIN: CPT

## 2018-04-20 PROCEDURE — 97162 PT EVAL MOD COMPLEX 30 MIN: CPT

## 2018-04-20 PROCEDURE — 82746 ASSAY OF FOLIC ACID SERUM: CPT

## 2018-04-20 PROCEDURE — 99239 HOSP IP/OBS DSCHRG MGMT >30: CPT

## 2018-04-20 PROCEDURE — 80048 BASIC METABOLIC PNL TOTAL CA: CPT

## 2018-04-20 PROCEDURE — 97166 OT EVAL MOD COMPLEX 45 MIN: CPT

## 2018-04-20 PROCEDURE — 86702 HIV-2 ANTIBODY: CPT

## 2018-04-20 PROCEDURE — 84165 PROTEIN E-PHORESIS SERUM: CPT

## 2018-04-20 PROCEDURE — 99232 SBSQ HOSP IP/OBS MODERATE 35: CPT | Mod: GC

## 2018-04-20 PROCEDURE — 87116 MYCOBACTERIA CULTURE: CPT

## 2018-04-20 RX ORDER — ASPIRIN/CALCIUM CARB/MAGNESIUM 324 MG
1 TABLET ORAL
Qty: 30 | Refills: 0 | OUTPATIENT
Start: 2018-04-20 | End: 2018-05-19

## 2018-04-20 RX ORDER — IBUPROFEN 200 MG
1 TABLET ORAL
Qty: 56 | Refills: 0 | OUTPATIENT
Start: 2018-04-20 | End: 2018-05-03

## 2018-04-20 RX ORDER — ACETAMINOPHEN 500 MG
2 TABLET ORAL
Qty: 112 | Refills: 0
Start: 2018-04-20 | End: 2018-05-03

## 2018-04-20 RX ADMIN — Medication 81 MILLIGRAM(S): at 11:11

## 2018-04-20 RX ADMIN — OXYCODONE AND ACETAMINOPHEN 2 TABLET(S): 5; 325 TABLET ORAL at 11:45

## 2018-04-20 RX ADMIN — HEPARIN SODIUM 5000 UNIT(S): 5000 INJECTION INTRAVENOUS; SUBCUTANEOUS at 06:15

## 2018-04-20 RX ADMIN — PANTOPRAZOLE SODIUM 40 MILLIGRAM(S): 20 TABLET, DELAYED RELEASE ORAL at 11:11

## 2018-04-20 RX ADMIN — Medication 1: at 11:18

## 2018-04-20 RX ADMIN — OXYCODONE AND ACETAMINOPHEN 2 TABLET(S): 5; 325 TABLET ORAL at 11:15

## 2018-04-20 RX ADMIN — OXYCODONE AND ACETAMINOPHEN 2 TABLET(S): 5; 325 TABLET ORAL at 04:08

## 2018-04-20 RX ADMIN — OXYCODONE AND ACETAMINOPHEN 2 TABLET(S): 5; 325 TABLET ORAL at 04:38

## 2018-04-20 RX ADMIN — HEPARIN SODIUM 5000 UNIT(S): 5000 INJECTION INTRAVENOUS; SUBCUTANEOUS at 13:04

## 2018-04-20 RX ADMIN — Medication 975 MILLIGRAM(S): at 06:18

## 2018-04-20 RX ADMIN — Medication 975 MILLIGRAM(S): at 06:15

## 2018-04-20 NOTE — PROGRESS NOTE ADULT - ASSESSMENT
62F with history of spinal stenosis s/p paraspinal injections p/w worsening back pain and elevated serum markers concerning for infection. MRI images reviewed which suggest lumbar spine epidural infection with slight paraspinal involvement. LP inconclusive x2. Neurosurgery called for possible biopsy as IR said no tragetable lesion. She does have b/l DF weakness which she states has been going on for a while (>48 hours), reports it is intermittent and related to leg swelling. Explained that it may be from this collection. She understands that she needs surgery.  Cx pending/path pending. HMV 0, removed. Wound checks.

## 2018-04-20 NOTE — PROGRESS NOTE ADULT - ASSESSMENT
62yoF with DJD, admitted with back pain and falls, found to have epidural mass. s/p laminectomy with biopsy for epidural mass- with prelim path suggestive of plasmacytoid cells.    - will need to followup final pathology results , will discuss with pathology  - review images with radiology  - discussed with patient and  if biopsy confirms plasmacytoma, will need [facility] evaluation and complete workup  - obtain myeloma labs: SPEP, LUCRECIA, quantitative immunoglobulins, light chains, pending  - will continue to follow 62yoF with DJD, admitted with back pain and falls, found to have epidural mass. s/p laminectomy with biopsy for epidural mass- with prelim path suggestive of plasmacytoid cells.    - will need to followup final pathology results , will discuss with pathology.  - has leptomeningeal enhancement on MRI, would await final results  - review images with radiology  - discussed with patient and  if biopsy confirms plasmacytoma, will need radon evaluation and complete workup  - obtain myeloma labs: SPEP, LUCRECIA, quantitative immunoglobulins, light chains, pending  - will continue to follow

## 2018-04-20 NOTE — DISCHARGE NOTE ADULT - MEDICATION SUMMARY - MEDICATIONS TO STOP TAKING
I will STOP taking the medications listed below when I get home from the hospital:    Tylenol with Codeine #3 oral tablet  -- 1 tab(s) by mouth every 12 hours x 4 days, As Needed -for severe pain MDD:2 tabs  -- Caution federal law prohibits the transfer of this drug to any person other  than the person for whom it was prescribed.  May cause drowsiness.  Alcohol may intensify this effect.  Use care when operating dangerous machinery.  This product contains acetaminophen.  Do not use  with any other product containing acetaminophen to prevent possible liver damage.  Using more of this medication than prescribed may cause serious breathing problems.    Dilaudid 2 mg oral tablet  -- 1 tab(s) by mouth every 4 hours MDD:4 pills  -- Caution federal law prohibits the transfer of this drug to any person other  than the person for whom it was prescribed.  May cause drowsiness.  Alcohol may intensify this effect.  Use care when operating dangerous machinery.  This prescription cannot be refilled.  Using more of this medication than prescribed may cause serious breathing problems.

## 2018-04-20 NOTE — PROGRESS NOTE ADULT - ATTENDING COMMENTS
case d/w ID   path dx still pending  recc await final path prior to dc  if e/o malignancy may require inpt chemo vs XRT  reccs d/w primary team  cont supportive care

## 2018-04-20 NOTE — PROGRESS NOTE BEHAVIORAL HEALTH - NSBHFUPINTERVALHXFT_PSY_A_CORE
Primary team requested f/u to evaluate need for 1:1.  Pt currently bright, pleasant, conversational, states she wants to go to rehab and prefers one with a whirlpool.  Denies SIIP/HIIP, AVH, paranoia.  Denies depression.  Endorses poor sleep 2/2 skin discomfort at her incision site.  No psych PRNs ON.

## 2018-04-20 NOTE — DISCHARGE NOTE ADULT - MEDICATION SUMMARY - MEDICATIONS TO TAKE
I will START or STAY ON the medications listed below when I get home from the hospital:    Tylenol 325 mg oral tablet  -- 2 tab(s) by mouth every 6 hours - for mild pain  -- Indication: For Intractable back pain    Alivio 600 mg oral tablet  -- 1 tab(s) by mouth every 6 hours, As needed for moderate pain  -- Indication: For Intractable back pain    [facility] Aspirin 81 mg oral tablet, chewable  -- 1 tab(s) by mouth once a day  -- Indication: For Intractable back pain    Percocet 5/325 oral tablet  -- 1 tab(s) by mouth every 8 hours, As Needed -Severe Pain (7 - 10) MDD:3 tabs   -- Indication: For Intractable back pain

## 2018-04-20 NOTE — PROGRESS NOTE ADULT - PROVIDER SPECIALTY LIST ADULT
Anesthesia
Anesthesia
Infectious Disease
Internal Medicine
Neurology
Neurosurgery
Radiology
Heme/Onc
Neurosurgery
Internal Medicine
Internal Medicine

## 2018-04-20 NOTE — DISCHARGE NOTE ADULT - CARE PLAN
Principal Discharge DX:	Intractable back pain  Goal:	Please make appointments to see neurosurgery, neurology, and rheumatology within 1 week  Assessment and plan of treatment:	You came to the hospital with lower back pain. Imaging of your spine showed fluid collections which required a biopsy. At this time, your precise diagnosis is unclear. So far, the biopsies show significant inflammation, though other alternatives such as infection and a tumor should also be considered. Please make an appointment with our rheumatologists for further workup.    We also recommend you see our neurosurgery team for post-op care as well as Dr. Vidal (neurology) within 1 week.  Secondary Diagnosis:	Cerebrovascular accident (CVA), unspecified mechanism  Goal:	Continue Aspirin daily, see your primary care physician  Assessment and plan of treatment:	You were found to have a small abnormality on an MRI of your brain, which is concerning for an old stroke. Please continue to take Aspirin daily to prevent new strokes.  Secondary Diagnosis:	Acute deep vein thrombosis (DVT) of distal vein of left lower extremity  Goal:	See Dr. Whyte (cardiology) in 1 week  Assessment and plan of treatment:	You were found to have a small clot in one of the veins of your left leg. Please make sure to see Dr. Whyte within 1 week for a follow-up ultrasound of your leg. If the new scans show the clot is moving up your leg, you may need to be started on anticoagulation (blood thinners). Principal Discharge DX:	Intractable back pain  Goal:	Please make appointments to see neurosurgery, neurology, and rheumatology within 1 week  Assessment and plan of treatment:	You came to the hospital with lower back pain. Imaging of your spine showed fluid collections which required a biopsy. At this time, your precise diagnosis is unclear. So far, the biopsies show significant inflammation, though other alternatives such as infection and a tumor should also be considered. Please make an appointment with our rheumatologists for further workup.    We also recommend you see our neurosurgery team for post-op care as well as Dr. Vidal (neurology) within 1 week.  Secondary Diagnosis:	Cerebrovascular accident (CVA), unspecified mechanism  Goal:	Continue Aspirin daily, see your primary care physician  Assessment and plan of treatment:	You were found to have a small abnormality on an MRI of your brain, which is concerning for an old stroke. Please continue to take Aspirin daily to prevent new strokes.  Secondary Diagnosis:	Acute deep vein thrombosis (DVT) of distal vein of left lower extremity  Goal:	See Dr. Whyte (cardiology) in 1 week  Assessment and plan of treatment:	You were found to have a small clot in one of the veins of your left leg. Please make sure to see Dr. Whyte within 1 week for a follow-up ultrasound of your leg. If the new scans show the clot is moving up your leg, you may need to be started on anticoagulation (blood thinners). It is very important you receive a repeat ultrasound.

## 2018-04-20 NOTE — DISCHARGE NOTE ADULT - PROVIDER TOKENS
TOKEN:'[id number]',TOKEN:'[id number]' TOKEN:'[id number]:'[id number]',FREE:[LAST:[[facility] Dept. of Neurosurgery],PHONE:[phone]],FAX:[(   )    -],ADDRESS:[[address] [place] [address]]],FREE:[LAST:[Division of Rheumatology],PHONE:[phone]],FAX:[(   )    -]] TOKEN:'[id number]',TOKEN:'[id number]',FREE:[LAST:[Division of Rheumatology],PHONE:[phone]],FAX:[(   )    -]],TOKEN:'[id number]'

## 2018-04-20 NOTE — PROGRESS NOTE ADULT - SUBJECTIVE AND OBJECTIVE BOX
INTERVAL HPI/OVERNIGHT EVENTS:  Patient S&E at bedside. patient says she feels great and she wants to go home. no pain      VITAL SIGNS:  T(F): 97.7 (04-20-18 @ 08:32)  HR: 93 (04-20-18 @ 08:32)  BP: 148/86 (04-20-18 @ 08:32)  RR: 18 (04-20-18 @ 08:32)  SpO2: 97% (04-20-18 @ 08:32)  Wt(kg): --    PHYSICAL EXAM:    Constitutional: NAD  Eyes: EOMI, sclera non-icteric  Neck: supple, no masses, no JVD  Respiratory: CTA b/l, good air entry b/l  Cardiovascular: RRR, no M/R/G  Gastrointestinal: soft, NTND, no masses palpable, + BS, no hepatosplenomegaly  Extremities: no c/c/e  Neurological: AAOx3      MEDICATIONS  (STANDING):  acetaminophen   Tablet. 975 milliGRAM(s) Oral every 12 hours  aspirin  chewable 81 milliGRAM(s) Oral daily  dextrose 5%. 1000 milliLiter(s) (50 mL/Hr) IV Continuous <Continuous>  dextrose 50% Injectable 12.5 Gram(s) IV Push once  dextrose 50% Injectable 25 Gram(s) IV Push once  dextrose 50% Injectable 25 Gram(s) IV Push once  docusate sodium 100 milliGRAM(s) Oral three times a day  heparin  Injectable 5000 Unit(s) SubCutaneous every 8 hours  influenza   Vaccine 0.5 milliLiter(s) IntraMuscular once  insulin glargine Injectable (LANTUS) 3 Unit(s) SubCutaneous at bedtime  insulin lispro (HumaLOG) corrective regimen sliding scale   SubCutaneous three times a day before meals  pantoprazole    Tablet 40 milliGRAM(s) Oral daily  sodium chloride 0.9% with potassium chloride 20 mEq/L 1000 milliLiter(s) (75 mL/Hr) IV Continuous <Continuous>    MEDICATIONS  (PRN):  acetaminophen   Tablet 650 milliGRAM(s) Oral every 6 hours PRN For Temp greater than 38 C (100.4 F)  dextrose Gel 1 Dose(s) Oral once PRN Blood Glucose LESS THAN 70 milliGRAM(s)/deciliter  glucagon  Injectable 1 milliGRAM(s) IntraMuscular once PRN Glucose LESS THAN 70 milligrams/deciliter  haloperidol    Injectable 1 milliGRAM(s) IntraMuscular every 6 hours PRN Agitation  ibuprofen  Tablet 600 milliGRAM(s) Oral every 6 hours PRN severe pain  ondansetron Injectable 4 milliGRAM(s) IV Push every 6 hours PRN Nausea and/or Vomiting  oxyCODONE    5 mG/acetaminophen 325 mG 2 Tablet(s) Oral every 6 hours PRN Severe Pain (7 - 10)  senna 2 Tablet(s) Oral at bedtime PRN Constipation      Allergies    No Known Allergies    Intolerances        LABS:                        9.1    14.84 )-----------( 387      ( 19 Apr 2018 09:39 )             27.3                 RADIOLOGY & ADDITIONAL TESTS:  Studies reviewed.

## 2018-04-20 NOTE — PROGRESS NOTE ADULT - ASSESSMENT
62year old with DJD, s/p trigger point injection, presented with back pain.    S/p laminectomy for epidural mass- with prelim path suggestive of plasmacytoid cells.    Await final path and cultures- seems less likely to be an infectious process    Will follow intermittently call ID service with questions. 62year old with DJD, s/p trigger point injection, presented with back pain.    S/p laminectomy for epidural mass- with prelim path suggestive of plasmacytoid cells.    Await final path and cultures- seems less likely to be an infectious process    I discussed case with hematology and surgery.  In the operating room, the tissue looked like grannulation tissue.  It did not appear a sa solid mass.    Follow path and cultures.     Will follow intermittently call ID service with questions.

## 2018-04-20 NOTE — PROGRESS NOTE ADULT - SUBJECTIVE AND OBJECTIVE BOX
CONTACT INFO:  Aime Muñoz MD  PGY-1 | Internal Medicine  Spectra: [phone]    Patient is a 62y old  Female who presents with a chief complaint of CC: "I need an emergent MRI" (10 Apr 2018 10:20)      SUBJECTIVE / OVERNIGHT EVENTS: No overnight events. No complaints this AM. Patient denies CP, SOB.  On tele:    REVIEW OF SYSTEMS:  14-point ROS was conducted with the patient and is negative except for those listed above.      MEDICATIONS  (STANDING):  acetaminophen   Tablet. 975 milliGRAM(s) Oral every 12 hours  aspirin  chewable 81 milliGRAM(s) Oral daily  dextrose 5%. 1000 milliLiter(s) (50 mL/Hr) IV Continuous <Continuous>  dextrose 50% Injectable 12.5 Gram(s) IV Push once  dextrose 50% Injectable 25 Gram(s) IV Push once  dextrose 50% Injectable 25 Gram(s) IV Push once  docusate sodium 100 milliGRAM(s) Oral three times a day  heparin  Injectable 5000 Unit(s) SubCutaneous every 8 hours  influenza   Vaccine 0.5 milliLiter(s) IntraMuscular once  insulin glargine Injectable (LANTUS) 3 Unit(s) SubCutaneous at bedtime  insulin lispro (HumaLOG) corrective regimen sliding scale   SubCutaneous three times a day before meals  pantoprazole    Tablet 40 milliGRAM(s) Oral daily  sodium chloride 0.9% with potassium chloride 20 mEq/L 1000 milliLiter(s) (75 mL/Hr) IV Continuous <Continuous>    MEDICATIONS  (PRN):  acetaminophen   Tablet 650 milliGRAM(s) Oral every 6 hours PRN For Temp greater than 38 C (100.4 F)  dextrose Gel 1 Dose(s) Oral once PRN Blood Glucose LESS THAN 70 milliGRAM(s)/deciliter  glucagon  Injectable 1 milliGRAM(s) IntraMuscular once PRN Glucose LESS THAN 70 milligrams/deciliter  haloperidol    Injectable 1 milliGRAM(s) IntraMuscular every 6 hours PRN Agitation  ibuprofen  Tablet 600 milliGRAM(s) Oral every 6 hours PRN severe pain  ondansetron Injectable 4 milliGRAM(s) IV Push every 6 hours PRN Nausea and/or Vomiting  oxyCODONE    5 mG/acetaminophen 325 mG 2 Tablet(s) Oral every 6 hours PRN Severe Pain (7 - 10)  senna 2 Tablet(s) Oral at bedtime PRN Constipation      T(C): 36.9 (04-20-18 @ 05:18), Max: 37 (04-19-18 @ 17:14)  HR: 108 (04-20-18 @ 05:18) (94 - 108)  BP: 133/71 (04-20-18 @ 05:18) (129/73 - 138/74)  RR: 18 (04-20-18 @ 05:18) (18 - 18)  SpO2: 98% (04-20-18 @ 05:18) (94% - 98%)    PHYSICAL EXAM  GENERAL: NAD, well-developed  NEURO: AO x3, PERRLA, EOMI, motor strength in tact in 4/4 extremities, sensation in tact  HEAD:  Atraumatic, Normocephalic  EYES: conjunctiva and sclera clear  NECK: Supple, No JVD, no lymphadenopathy, no thyromegaly  CHEST/LUNG: Clear to auscultation bilaterally; No wheezes, rales or rhonchi  HEART: Regular rate and rhythm; No murmurs, rubs, or gallops  ABDOMEN: Soft, Nontender, Nondistended; Bowel sounds present, no masses.  EXTREMITIES:  2+ Peripheral Pulses, No clubbing, cyanosis, or edema  SKIN: Warm, dry, in tact, no rashes or lesions  PSYCH: affect appropriate    LABS:                        9.1    14.84 )-----------( 387      ( 19 Apr 2018 09:39 )             27.3         TPro  6.9  /  Alb  x   /  TBili  x   /  DBili  x   /  AST  x   /  ALT  x   /  AlkPhos  x   04-18            I&O's Summary    19 Apr 2018 07:01  -  20 Apr 2018 07:00  --------------------------------------------------------  IN: 1480 mL / OUT: 400 mL / NET: 1080 mL        MICROBIOLOGY:    RADIOLOGY:  < from: [facility] Duplex Lower Ext Vein Scan, Bilat (04.19.18 @ 11:25) >  IMPRESSION:     Acute, below the knee, DVT of the left soleal vein. No evidence of right   lower extremity deep venous thrombosis.Dr. Nguyễn was informed of   the findings following the study on 4/19/2018.    < end of copied text > CONTACT INFO:  Aime Muñoz MD  PGY-1 | Internal Medicine  Spectra: [phone]    Patient is a 62y old  Female who presents with a chief complaint of CC: "I need an emergent MRI" (10 Apr 2018 10:20)      SUBJECTIVE / OVERNIGHT EVENTS: No overnight events. No complaints this AM. Patient denies CP, SOB. Drain removed by NSG this AM.  On tele:    REVIEW OF SYSTEMS:  14-point ROS was conducted with the patient and is negative except for those listed above.      MEDICATIONS  (STANDING):  acetaminophen   Tablet. 975 milliGRAM(s) Oral every 12 hours  aspirin  chewable 81 milliGRAM(s) Oral daily  dextrose 5%. 1000 milliLiter(s) (50 mL/Hr) IV Continuous <Continuous>  dextrose 50% Injectable 12.5 Gram(s) IV Push once  dextrose 50% Injectable 25 Gram(s) IV Push once  dextrose 50% Injectable 25 Gram(s) IV Push once  docusate sodium 100 milliGRAM(s) Oral three times a day  heparin  Injectable 5000 Unit(s) SubCutaneous every 8 hours  influenza   Vaccine 0.5 milliLiter(s) IntraMuscular once  insulin glargine Injectable (LANTUS) 3 Unit(s) SubCutaneous at bedtime  insulin lispro (HumaLOG) corrective regimen sliding scale   SubCutaneous three times a day before meals  pantoprazole    Tablet 40 milliGRAM(s) Oral daily  sodium chloride 0.9% with potassium chloride 20 mEq/L 1000 milliLiter(s) (75 mL/Hr) IV Continuous <Continuous>    MEDICATIONS  (PRN):  acetaminophen   Tablet 650 milliGRAM(s) Oral every 6 hours PRN For Temp greater than 38 C (100.4 F)  dextrose Gel 1 Dose(s) Oral once PRN Blood Glucose LESS THAN 70 milliGRAM(s)/deciliter  glucagon  Injectable 1 milliGRAM(s) IntraMuscular once PRN Glucose LESS THAN 70 milligrams/deciliter  haloperidol    Injectable 1 milliGRAM(s) IntraMuscular every 6 hours PRN Agitation  ibuprofen  Tablet 600 milliGRAM(s) Oral every 6 hours PRN severe pain  ondansetron Injectable 4 milliGRAM(s) IV Push every 6 hours PRN Nausea and/or Vomiting  oxyCODONE    5 mG/acetaminophen 325 mG 2 Tablet(s) Oral every 6 hours PRN Severe Pain (7 - 10)  senna 2 Tablet(s) Oral at bedtime PRN Constipation      T(C): 36.9 (04-20-18 @ 05:18), Max: 37 (04-19-18 @ 17:14)  HR: 108 (04-20-18 @ 05:18) (94 - 108)  BP: 133/71 (04-20-18 @ 05:18) (129/73 - 138/74)  RR: 18 (04-20-18 @ 05:18) (18 - 18)  SpO2: 98% (04-20-18 @ 05:18) (94% - 98%)    PHYSICAL EXAM:  GENERAL: Laying in bed, in NAD  HEENT: Head NC/AT  NECK: No deformity, supple  CHEST/LUNG: CTABL, no wheezes  CARDIOVASCULAR: RRR, no murmur/rubs/gallops. No LE edema.  ABDOMEN: Soft, NT  BACK: Stapes overlying surgical site, bandaged, SERJIO drain removed  EXTREMITIES: No deformities. FROM x all 4 extremities.  NEURO: A/O x 3, alert this AM, otherwise non-focal neuro exam  PSYCH: Cooperative    LABS:                        9.1    14.84 )-----------( 387      ( 19 Apr 2018 09:39 )             27.3         TPro  6.9  /  Alb  x   /  TBili  x   /  DBili  x   /  AST  x   /  ALT  x   /  AlkPhos  x   04-18            I&O's Summary    19 Apr 2018 07:01  -  20 Apr 2018 07:00  --------------------------------------------------------  IN: 1480 mL / OUT: 400 mL / NET: 1080 mL        MICROBIOLOGY:    RADIOLOGY:  < from: [facility] Duplex Lower Ext Vein Scan, Bilat (04.19.18 @ 11:25) >  IMPRESSION:     Acute, below the knee, DVT of the left soleal vein. No evidence of right   lower extremity deep venous thrombosis.Dr. Nguyễn was informed of   the findings following the study on 4/19/2018.    < end of copied text >

## 2018-04-20 NOTE — DISCHARGE NOTE ADULT - PLAN OF CARE
Please make appointments to see neurosurgery, neurology, and rheumatology within 1 week You came to the hospital with lower back pain. Imaging of your spine showed fluid collections which required a biopsy. At this time, your precise diagnosis is unclear. So far, the biopsies show significant inflammation, though other alternatives such as infection and a tumor should also be considered. Please make an appointment with our rheumatologists for further workup.    We also recommend you see our neurosurgery team for post-op care as well as Dr. Vidal (neurology) within 1 week. Continue Aspirin daily, see your primary care physician You were found to have a small abnormality on an MRI of your brain, which is concerning for an old stroke. Please continue to take Aspirin daily to prevent new strokes. See Dr. Whyte (cardiology) in 1 week You were found to have a small clot in one of the veins of your left leg. Please make sure to see Dr. Whyte within 1 week for a follow-up ultrasound of your leg. If the new scans show the clot is moving up your leg, you may need to be started on anticoagulation (blood thinners). You were found to have a small clot in one of the veins of your left leg. Please make sure to see Dr. Whyte within 1 week for a follow-up ultrasound of your leg. If the new scans show the clot is moving up your leg, you may need to be started on anticoagulation (blood thinners). It is very important you receive a repeat ultrasound.

## 2018-04-20 NOTE — PROGRESS NOTE BEHAVIORAL HEALTH - NSBHCHARTREVIEWLAB_PSY_A_CORE FT
9.1    14.84 )-----------( 387      ( 19 Apr 2018 09:39 )             27.3         TPro  6.9  /  Alb  x   /  TBili  x   /  DBili  x   /  AST  x   /  ALT  x   /  AlkPhos  x   04-18

## 2018-04-20 NOTE — PROGRESS NOTE ADULT - PROBLEM SELECTOR PROBLEM 3
Emotional lability
Stroke

## 2018-04-20 NOTE — DISCHARGE NOTE ADULT - CARE PROVIDER_API CALL
Jordan Vidal (DO), Neurology  [address] [place] [address]  Phone: [phone]  Fax: [phone]    Ford Oliveros), Family Medicine  [address] [place] [id number]  Phone: [phone]  Fax: [phone] Jordan Vidal (DO), Neurology  [address] [place] [address]  Phone: [phone]  Fax: [phone]    Zane Whyte (DO), Internal Medicine; Nuclear Cardiology  [address] [place] [address]  Phone: [phone]  Fax: [phone]    [facility] Dept. of Neurosurgery,   [address] [place] [id number]  Phone: [phone]  Fax: (   )    -    Division of Rheumatology,   Phone: [phone]  Fax: (   )    - Jordan Vidal (DO), Neurology  [address] [place] [address]  Phone: [phone]  Fax: [phone]    Zane Whyte (DO), Internal Medicine; Nuclear Cardiology  [address] [place] [address]  Phone: [phone]  Fax: [phone]    Division of Rheumatology,   Phone: [phone]  Fax: (   )    -    Rose Rea (MD; PhD), Neurological Surgery  [address]  [address]  [place] [address]  Phone: [phone]  Fax: [phone]

## 2018-04-20 NOTE — DISCHARGE NOTE ADULT - HOSPITAL COURSE
63 y/o F PMHx significant for lumbar degenerative disc disease and fibromyalgia p/w chronic lumbago as well as progressively worsening LE weakness and now with complaints of inability to walk for several weeks. A CT lumbar spine showed degenerative disc disease. Patient was followed by private neurology during her hospital stay, who recommended MR of neural axis. MR of lumbar spine showed:  < from: MR Lumbar Spine w/wo IV Cont (04.11.18 @ 21:03) >  IMPRESSION:  There is no cord compression along the cervical or thoracic spine. The   spinal cord demonstrates normal course and caliber.    There is abnormal enhancement involving the entire cauda equina extending   to the ventral and dorsal aspects of the lower thoracic cord. There is   abnormal epidural complex possibly multiloculated abscess versus phlegmon   centered at the left L4-L5 facet joint, possibly involving the L5-S1   facet.  Severe spinal canal stenosis with compression of all descending   enhancing nerves at L4-L5 isseen. There is abnormal enhancement of the   intervertebral disc bordering this process at L4-L5 and L5-S1.    The constellation of these findings is most consistent with left L4-L5,   possibly L5-S1 facetitis with resulting epidural phlegmon versus complex   abscess spanning L3-L5 and arachnoiditis involving the cauda equina. The   differential diagnosis of sarcoidosis versus lymphoma are not entirely   excluded; please correlate with patient history, and clinical and   laboratory exam findings.    < end of copied text >    It was unclear whether these findings represented infection or neoplasm.  Patient was evaluated by ID who initially recommended Vanc/Willi but later recommended discontinuation. ID said a tissue diagnosis was necessary.    IR was consulted for possible drainage but said there was no targetable lesion. Therefore, NSG was consulted who performed a lumbar laminectomy with biopsy. Initial bx showed plasmacytoids, so myeloma w/u was done which was not suggestive of multiple myeloma. Heme/Onc was onboard, who reviewed the biopsy slides with pathology, and it was determined the tissue diagnosis was more suggestive of an inflammatory process rather than infection or malignancy,    The MR of the brain showed:  < from: MR Head w/wo IV Cont (04.11.18 @ 21:02) >  IMPRESSION:    A subcentimeter nonspecific focus of enhancement involves the right   parietal periventricular white matter. Differential considerations   include a small subacute infarct with hemorrhagic transformation, a focus   of infection-cerebritis, a small mass, or atypical demyelination.    Extensive sinusitis.      < end of copied text >    Patient's MR brain findings were believed to be 2/2 subacute infarct and was started on ASA. Neuro recommended a CTA head/neck which showed:  < from: CT Angio Head w/ IV Cont (04.14.18 @ 18:02) >  IMPRESSION:  No occlusion, significant stenosis or other vascular   abnormality identified.    Mucosal disease of the paranasal sinuses with a large septal perforation   and intranasal mucosal thickening raising the possibility of   granulomatous disease      < end of copied text >    Patient was pending further evaluation with CT of sinuses by the time she decided to leave [place] (see below).     On 4/20, patient decided to leave [facility] against medical advice. She was advised to follow up with neurosurgery for post-op eval within 1 week, as well as neurology. She was also referred to [facility] rheumatology for further workup of her lumbar spine findings in the setting of her ?granulomatous disease noted on CTA. 63 y/o F PMHx significant for lumbar degenerative disc disease and fibromyalgia p/w chronic lumbago as well as progressively worsening LE weakness and now with complaints of inability to walk for several weeks. A CT lumbar spine showed degenerative disc disease. Patient was followed by private neurology during her hospital stay, who recommended MR of neural axis. MR of lumbar spine showed:  < from: MR Lumbar Spine w/wo IV Cont (04.11.18 @ 21:03) >  IMPRESSION:  There is no cord compression along the cervical or thoracic spine. The   spinal cord demonstrates normal course and caliber.    There is abnormal enhancement involving the entire cauda equina extending   to the ventral and dorsal aspects of the lower thoracic cord. There is   abnormal epidural complex possibly multiloculated abscess versus phlegmon   centered at the left L4-L5 facet joint, possibly involving the L5-S1   facet.  Severe spinal canal stenosis with compression of all descending   enhancing nerves at L4-L5 isseen. There is abnormal enhancement of the   intervertebral disc bordering this process at L4-L5 and L5-S1.    The constellation of these findings is most consistent with left L4-L5,   possibly L5-S1 facetitis with resulting epidural phlegmon versus complex   abscess spanning L3-L5 and arachnoiditis involving the cauda equina. The   differential diagnosis of sarcoidosis versus lymphoma are not entirely   excluded; please correlate with patient history, and clinical and   laboratory exam findings.    < end of copied text >    It was unclear whether these findings represented infection or neoplasm.  Patient was evaluated by ID who initially recommended Vanc/Willi but later recommended discontinuation. ID said a tissue diagnosis was necessary.    IR was consulted for possible drainage but said there was no targetable lesion. Therefore, NSG was consulted who performed a lumbar laminectomy with biopsy. Initial bx showed plasmacytoids, so myeloma w/u was done which was not suggestive of multiple myeloma. Heme/Onc was onboard, who reviewed the biopsy slides with pathology, and it was determined the tissue diagnosis was more suggestive of an inflammatory process rather than infection or malignancy,    The MR of the brain showed:  < from: MR Head w/wo IV Cont (04.11.18 @ 21:02) >  IMPRESSION:    A subcentimeter nonspecific focus of enhancement involves the right   parietal periventricular white matter. Differential considerations   include a small subacute infarct with hemorrhagic transformation, a focus   of infection-cerebritis, a small mass, or atypical demyelination.    Extensive sinusitis.      < end of copied text >    Patient's MR brain findings were believed to be 2/2 subacute infarct and was started on ASA. Neuro recommended a CTA head/neck which showed:  < from: CT Angio Head w/ IV Cont (04.14.18 @ 18:02) >  IMPRESSION:  No occlusion, significant stenosis or other vascular   abnormality identified.    Mucosal disease of the paranasal sinuses with a large septal perforation   and intranasal mucosal thickening raising the possibility of   granulomatous disease      < end of copied text >    Patient was pending further evaluation with CT of sinuses by the time she decided to leave [place] (see below).     On 4/20, patient decided to leave [facility] against medical advice. She was advised to follow up with neurosurgery for post-op eval within 1 week, as well as neurology. She was also referred to [facility] rheumatology for further workup of her lumbar spine findings in the setting of her ?granulomatous disease noted on CTA. Finally, she was advised to see Dr. Whyte for a repeat duplex of LE to assess for interval change of L soleal DVT noted during this hospital stay. 63 y/o F PMHx significant for lumbar degenerative disc disease and fibromyalgia p/w chronic lumbago as well as progressively worsening LE weakness and now with complaints of inability to walk for several weeks. A CT lumbar spine showed degenerative disc disease. Patient was followed by private neurology during her hospital stay, who recommended MR of neural axis. MR of lumbar spine showed:  < from: MR Lumbar Spine w/wo IV Cont (04.11.18 @ 21:03) >  IMPRESSION:  There is no cord compression along the cervical or thoracic spine. The   spinal cord demonstrates normal course and caliber.    There is abnormal enhancement involving the entire cauda equina extending   to the ventral and dorsal aspects of the lower thoracic cord. There is   abnormal epidural complex possibly multiloculated abscess versus phlegmon   centered at the left L4-L5 facet joint, possibly involving the L5-S1   facet.  Severe spinal canal stenosis with compression of all descending   enhancing nerves at L4-L5 isseen. There is abnormal enhancement of the   intervertebral disc bordering this process at L4-L5 and L5-S1.    The constellation of these findings is most consistent with left L4-L5,   possibly L5-S1 facetitis with resulting epidural phlegmon versus complex   abscess spanning L3-L5 and arachnoiditis involving the cauda equina. The   differential diagnosis of sarcoidosis versus lymphoma are not entirely   excluded; please correlate with patient history, and clinical and   laboratory exam findings.    < end of copied text >    It was unclear whether these findings represented infection or neoplasm.  Patient was evaluated by ID who initially recommended Vanc/Willi but later recommended discontinuation. ID said a tissue diagnosis was necessary.    IR was consulted for possible drainage but said there was no targetable lesion. Therefore, NSG was consulted who performed a lumbar laminectomy with biopsy. Initial bx showed plasmacytoids, so myeloma w/u was done which was not suggestive of multiple myeloma. Heme/Onc was onboard, who reviewed the biopsy slides with pathology, and it was determined the tissue diagnosis was more suggestive of an inflammatory process rather than infection or malignancy,    The MR of the brain showed:  < from: MR Head w/wo IV Cont (04.11.18 @ 21:02) >  IMPRESSION:    A subcentimeter nonspecific focus of enhancement involves the right   parietal periventricular white matter. Differential considerations   include a small subacute infarct with hemorrhagic transformation, a focus   of infection-cerebritis, a small mass, or atypical demyelination.    Extensive sinusitis.      < end of copied text >    Patient's MR brain findings were believed to be 2/2 subacute infarct and was started on ASA. Neuro recommended a CTA head/neck which showed:  < from: CT Angio Head w/ IV Cont (04.14.18 @ 18:02) >  IMPRESSION:  No occlusion, significant stenosis or other vascular   abnormality identified.    Mucosal disease of the paranasal sinuses with a large septal perforation   and intranasal mucosal thickening raising the possibility of   granulomatous disease      < end of copied text >    Patient reports hx of nasal polyps that were removed by ENT, she knows she has septal perforation as a complication of that procedure. This said, she was pending further evaluation with CT of sinuses by the time she decided to leave (see below).     On 4/20, patient decided to leave [facility] against our advice. We recommended she stay to expedite the work up of her spinal lesion as per path it seemed less likely to be malignant, and we didn't yet have a clear idea of diagnosis or treatment course. She started screaming and yelling, insisted on leaving. Per psych eval earlier on 4/20 she likely had personality disorder though was not otherwise incapacitated, she was able to repeat back risks of leaving and she assured us she will f/u outpatient within 1-2 weeks.  by bedside ensures that she will as well. D/w rheumatology prior to dc, their clinic will call to see her in 1 week to f/u on path and begin rheumatologic eval as it could relate to her inflammatory spinal lesion (?granulomatous disease). Neurosurg will take staples out in 1-2 weeks, d/ wthem prior to d/c as well. F/u w/ Dr. Whyte in 1-2 week for U/S screening of distal DVT. Unable to get to rehab as patient has 1:1. Patient refusing to wait until next week, will have case management try to set up home PT for her.  has wheelchair and walker at home and assumes responsibility for helping her perform ADLs and IADLs. 63 y/o F PMHx significant for lumbar degenerative disc disease and fibromyalgia p/w chronic lumbago as well as progressively worsening LE weakness and now with complaints of inability to walk for several weeks. A CT lumbar spine showed degenerative disc disease. Patient was followed by private neurology during her hospital stay, who recommended MR of neural axis. MR of lumbar spine showed:  < from: MR Lumbar Spine w/wo IV Cont (04.11.18 @ 21:03) >  IMPRESSION:  There is no cord compression along the cervical or thoracic spine. The   spinal cord demonstrates normal course and caliber.    There is abnormal enhancement involving the entire cauda equina extending   to the ventral and dorsal aspects of the lower thoracic cord. There is   abnormal epidural complex possibly multiloculated abscess versus phlegmon   centered at the left L4-L5 facet joint, possibly involving the L5-S1   facet.  Severe spinal canal stenosis with compression of all descending   enhancing nerves at L4-L5 isseen. There is abnormal enhancement of the   intervertebral disc bordering this process at L4-L5 and L5-S1.    The constellation of these findings is most consistent with left L4-L5,   possibly L5-S1 facetitis with resulting epidural phlegmon versus complex   abscess spanning L3-L5 and arachnoiditis involving the cauda equina. The   differential diagnosis of sarcoidosis versus lymphoma are not entirely   excluded; please correlate with patient history, and clinical and   laboratory exam findings.    < end of copied text >    It was unclear whether these findings represented infection or neoplasm.  Patient was evaluated by ID who initially recommended Vanc/Willi but later recommended discontinuation. ID said a tissue diagnosis was necessary.    IR was consulted for possible drainage but said there was no targetable lesion. Therefore, NSG was consulted who performed a lumbar laminectomy with biopsy. Initial bx showed plasmacytoids, so myeloma w/u was done which was not suggestive of multiple myeloma. Heme/Onc was onboard, who reviewed the biopsy slides with pathology, and it was determined the tissue diagnosis was more suggestive of an inflammatory process rather than infection or malignancy,    The MR of the brain showed:  < from: MR Head w/wo IV Cont (04.11.18 @ 21:02) >  IMPRESSION:    A subcentimeter nonspecific focus of enhancement involves the right   parietal periventricular white matter. Differential considerations   include a small subacute infarct with hemorrhagic transformation, a focus   of infection-cerebritis, a small mass, or atypical demyelination.    Extensive sinusitis.      < end of copied text >    Patient's MR brain findings were believed to be 2/2 subacute infarct and was started on ASA. Neuro recommended a CTA head/neck which showed:  < from: CT Angio Head w/ IV Cont (04.14.18 @ 18:02) >  IMPRESSION:  No occlusion, significant stenosis or other vascular   abnormality identified.    Mucosal disease of the paranasal sinuses with a large septal perforation   and intranasal mucosal thickening raising the possibility of   granulomatous disease      < end of copied text >    Patient reports hx of nasal polyps that were removed by ENT, she knows she has septal perforation as a complication of that procedure. This said, she was pending further evaluation with CT of sinuses by the time she decided to leave (see below).     On 4/20, patient decided to leave [facility] against our advice. We recommended she stay to expedite the work up of her spinal lesion as we didn't yet have a clear idea of diagnosis or treatment course--awaiting path. She was screaming and yelling, insisted on leaving. Per psych eval earlier on 4/20 she likely had personality disorder though was not otherwise incapacitated, she was able to repeat back risks of leaving including serious bodily harm and/or death and she assured us she will f/u outpatient within 1-2 weeks.  by bedside ensures that she will as well. D/w consulting teams prior to d/c to ensure close f/u and she doesn't fall through the cracks; d/w rheumatology, their clinic will call to see her in 1 week to f/u on path and begin rheumatologic eval as it could relate to her inflammatory spinal lesion (?granulomatous disease). If malignant she will need to f/u urgently w/ shahla. Neurosurg will take staples out in 1-2 weeks, will f/u w/ Dr. Rea. F/u w/ Dr. Whyte in 1-2 week for U/S screening of distal DVT. F/u w/ neurology for findings of arachnoiditis on MRI. D/c with no more than 2-3 days of opioids for acute post surgical pain control; otherwise can use NSAIDs/tylenol.     Unable to get to rehab as patient has 1:1 with inpatient agitation. Patient refusing to wait until next week to try again, will have case management try to set up home PT for her.  has wheelchair and walker at home and assumes responsibility for helping her perform ADLs and IADLs. 63 y/o F PMHx significant for lumbar degenerative disc disease and fibromyalgia p/w chronic lumbago as well as progressively worsening LE weakness and now with complaints of inability to walk for several weeks. A CT lumbar spine showed degenerative disc disease. Patient was followed by private neurology during her hospital stay, who recommended MR of neural axis. MR of lumbar spine showed:  < from: MR Lumbar Spine w/wo IV Cont (04.11.18 @ 21:03) >  IMPRESSION:  There is no cord compression along the cervical or thoracic spine. The   spinal cord demonstrates normal course and caliber.    There is abnormal enhancement involving the entire cauda equina extending   to the ventral and dorsal aspects of the lower thoracic cord. There is   abnormal epidural complex possibly multiloculated abscess versus phlegmon   centered at the left L4-L5 facet joint, possibly involving the L5-S1   facet.  Severe spinal canal stenosis with compression of all descending   enhancing nerves at L4-L5 isseen. There is abnormal enhancement of the   intervertebral disc bordering this process at L4-L5 and L5-S1.    The constellation of these findings is most consistent with left L4-L5,   possibly L5-S1 facetitis with resulting epidural phlegmon versus complex   abscess spanning L3-L5 and arachnoiditis involving the cauda equina. The   differential diagnosis of sarcoidosis versus lymphoma are not entirely   excluded; please correlate with patient history, and clinical and   laboratory exam findings.    < end of copied text >    It was unclear whether these findings represented infection or neoplasm.  Patient was evaluated by ID who initially recommended Vanc/Willi but later recommended discontinuation. ID said a tissue diagnosis was necessary.    IR was consulted for possible drainage but said there was no targetable lesion. Therefore, NSG was consulted who performed a lumbar laminectomy with biopsy. Initial bx showed plasmacytoids, so myeloma w/u was done which was not suggestive of multiple myeloma. Heme/Onc was onboard, who reviewed the biopsy slides with pathology, and it was determined the tissue diagnosis was more suggestive of an inflammatory process rather than infection or malignancy,    The MR of the brain showed:  < from: MR Head w/wo IV Cont (04.11.18 @ 21:02) >  IMPRESSION:    A subcentimeter nonspecific focus of enhancement involves the right   parietal periventricular white matter. Differential considerations   include a small subacute infarct with hemorrhagic transformation, a focus   of infection-cerebritis, a small mass, or atypical demyelination.    Extensive sinusitis.      < end of copied text >    Patient's MR brain findings were believed to be 2/2 subacute infarct and was started on ASA. Neuro recommended a CTA head/neck which showed:  < from: CT Angio Head w/ IV Cont (04.14.18 @ 18:02) >  IMPRESSION:  No occlusion, significant stenosis or other vascular   abnormality identified.    Mucosal disease of the paranasal sinuses with a large septal perforation   and intranasal mucosal thickening raising the possibility of   granulomatous disease      < end of copied text >    Patient reports hx of nasal polyps that were removed by ENT, she knows she has septal perforation as a complication of that procedure. This said, she was pending further evaluation with CT of sinuses by the time she decided to leave (see below).     On 4/20, patient decided to leave [facility] against our advice. We recommended she stay to expedite the work up of her spinal lesion as we didn't yet have a clear idea of diagnosis or treatment course--awaiting path. She was screaming and yelling, insisted on leaving. Per psych eval earlier on 4/20 she likely had personality disorder though was not otherwise incapacitated, she was able to repeat back risks of leaving including serious bodily harm and/or death and she assured us she will f/u outpatient within 1-2 weeks.  by bedside ensures that she will f/u as well. D/w consulting teams prior to d/c to ensure close f/u and she doesn't fall through the cracks; d/w rheumatology, their clinic will call to see her in 1 week to f/u on path and begin rheumatologic eval as it could relate to her inflammatory spinal lesion (?granulomatous disease). If malignant she will need to f/u urgently w/ shahla; their last note comments on ?need for inpatient chemo vs. XRT though again patient unwilling to consider staying. Neurosurg will take staples out in 1-2 weeks, will f/u w/ Dr. Rea. F/u w/ Dr. Whyte in 1-2 week for U/S screening of distal DVT. F/u w/ neurology for findings of arachnoiditis on MRI. D/c with no more than 2-3 days of opioids for acute post surgical pain control; otherwise can use NSAIDs/tylenol.     Unable to get to rehab as patient has 1:1 with inpatient agitation. Patient refusing to wait until next week to try again, will have case management try to set up home PT for her.  has wheelchair and walker at home and assumes responsibility for helping her perform ADLs and IADLs.

## 2018-04-20 NOTE — CHART NOTE - NSCHARTNOTEFT_GEN_A_CORE
R3 Chart note    Called as patient wanted to leave the hospital. 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 ID and Heme/onc workup has been negative, and had wanted to do further rheumatologic workup for cause. However, patient says she does not want to stay in the hospital any longer. Discussed with patient hospital course and current plans, but patient says she still wants to leave. Discussed risks of leaving including complications from worsening inflammation and possibly death, and patient reiterated understanding and said she understands she can get worse, but says she feels better and will come back to the hospital if it gets worse. Also reports she will follow up with her doctors outpatient at the times they had requested of her. , Arthur, was at bedside, who said R3 Chart note    Called as patient wanted to leave the hospital. 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 ID and Heme/onc workup has been negative, and had wanted to do further rheumatologic workup for cause. However, patient says she does not want to stay in the hospital any longer. Discussed with patient hospital course and current plans, but patient says she still wants to leave. Discussed risks of leaving including complications from worsening inflammation and possibly death, and patient reiterated understanding and said she understands she can get worse, but says she feels better and will come back to the hospital if it gets worse. Also reports she will follow up with her doctors outpatient at the times they had requested of her. , Arthur, was at bedside, who said he also understands risk and will bring patient home, as she does not want to stay here any further. Discussed importance of follow up and patient understood. AMA form signed and placed in chart.     ANGEL Pal MD, PGY3 Team 4  Pager: [phone] R3 Chart note    Called as patient wanted to leave the hospital. 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 ID and Heme/onc workup has been negative, and had wanted to do further rheumatologic workup for cause. However, patient says she does not want to stay in the hospital any longer. Discussed with patient hospital course and current plans, but patient says she still wants to leave. Discussed risks of leaving including complications from worsening inflammation and possibly death, and patient reiterated understanding and said she understands she can get worse, but says she feels better and will come back to the hospital if it gets worse. Also reports she will follow up with her doctors outpatient at the times they had requested of her. , Arthur, was at bedside, who said he also understands risk and will bring patient home, as she does not want to stay here any further. Discussed importance of follow up and patient understood. AMA form signed and placed in chart.     ANGEL Pal MD, PGY3 Team 4  Pager: [phone]    Addendum: D/w patient and ,  willing to assume responsibility for taking her to multiple outpatient providers. Cannot get into rehab today given 1:1 supervision and she's unwilling to stay the weekend, screaming and yelling.  has walker and wheelchair at home and is willing to assume responsibility for helping her get around. No acute inpatient threatening issues right now, HD stable w/ SERJIO drain out, though she MUST f/u outpatient. D/w  and patient,  assures me she will. I also fear that forcing her to sign out AMA will further anger and alienate her from the medical system, thus rendering her less likely to f/u.     As such, will discharge. See d/c summary for details.

## 2018-04-20 NOTE — PROGRESS NOTE ADULT - SUBJECTIVE AND OBJECTIVE BOX
Visit Summary: Patient seen and evaluated at bedside.    Overnight Events: none    Exam:  Vital Signs Last 24 Hrs  Vital Signs Last 24 Hrs  T(C): 36.9 (20 Apr 2018 05:18), Max: 37 (19 Apr 2018 17:14)  T(F): 98.4 (20 Apr 2018 05:18), Max: 98.6 (19 Apr 2018 17:14)  HR: 108 (20 Apr 2018 05:18) (94 - 108)  BP: 133/71 (20 Apr 2018 05:18) (129/73 - 138/74)  BP(mean): --  RR: 18 (20 Apr 2018 05:18) (18 - 18)  SpO2: 98% (20 Apr 2018 05:18) (94% - 98%)    PHYSICAL EXAM:    Constitutional: No Acute Distress     Neurological: AOx3, Following Commands    Motor exam:          Upper extremity                         Delt     Bicep     Tricep    HG                                                 R         5/5        5/5        5/5       5/5                                               L          5/5        5/5        5/5       5/5          Lower extremity                        HF         KF        KE       DF         PF                                                  R        3/5 (pain)       5/5        5/5       2/5         2/5                                               L         3/5 (pain)       5/5       5/5       2/5          2/5                                                 Sensation: [x] intact to light touch  [] decreased:     No clonus

## 2018-04-20 NOTE — PROGRESS NOTE ADULT - PROBLEM SELECTOR PROBLEM 1
Intractable back pain

## 2018-04-20 NOTE — DISCHARGE NOTE ADULT - CARE PROVIDERS DIRECT ADDRESSES
,DirectAddress_Unknown,DirectAddress_Unknown ,DirectAddress_Unknown,[email or web address],DirectAddress_Unknown,DirectAddress_Unknown ,DirectAddress_Unknown,[email or web address],DirectAddress_Unknown,[email or web address]

## 2018-04-20 NOTE — PROGRESS NOTE ADULT - ATTENDING COMMENTS
Very challenging case. Ms. Nguyen is insisting on leaving despite being aware that we don't know what her biopsy results are. Screaming and yelling. 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 When residents and myself discussed w/ her she was able to repeat back risks of leaving, though risks are mitigated by  who assures me she will not fall through the cracks and will f/u closely w/ outpatient physicians. He also has walker and wheelchair at home and is willing to assume responsibility in helping her get around the house. Unable to d/c to rehab as she's required 1:1 while here and she absolutely insists on leaving.      As such, she will be discharged w/ close medical f/u. D/w heme, biopsy is not c/w an oncologic process-- no need for f/u. D/w rheum, they will call her to f/u this week, suspect inflammation. D/w neurosurg, ok for staple removal as an outpatient in 1-2 weeks w/ Dr. Rea. Will f/u w/ outpatient neurology for radiographic arachnoiditis. See Dr. Whyte in 1 week for monitoring of distal DVT. No urgent need for ENT: per pt. she had nasal polyps in past, removed by ENT who drilled whole in her septum; thus not 2/2 inflammation. Strict RTC precautions provided, I discussed w/ her and  multiple times the importance of f/u.

## 2018-04-20 NOTE — DISCHARGE NOTE ADULT - SECONDARY DIAGNOSIS.
Cerebrovascular accident (CVA), unspecified mechanism Acute deep vein thrombosis (DVT) of distal vein of left lower extremity

## 2018-04-20 NOTE — PROGRESS NOTE BEHAVIORAL HEALTH - SUMMARY
61 y/o female, domiciled at home with , unemployed and unable to get disability, no known psychiatric hx, no known hx of substance abuse, with a PMHx of fibromyalgia and spinal stenosis with a herniated cervical disc currently being managed by neurology, who presented to the ED for an MRI under sedation s/p laminectomy, currently calm and cooperative, no SIIP/HIIP, no depressive sx, no need for CO 1:1.

## 2018-04-20 NOTE — PROGRESS NOTE ADULT - SUBJECTIVE AND OBJECTIVE BOX
Follow Up:      Inverval History/ROS:Patient is a 62y old  Female who presents with a chief complaint of CC: "I need an emergent MRI" (20 Apr 2018 13:02)    Feels better. Denies headache.  Back pain controlled.       Allergies    No Known Allergies    Intolerances        ANTIMICROBIALS:      OTHER MEDS:  acetaminophen   Tablet 650 milliGRAM(s) Oral every 6 hours PRN  acetaminophen   Tablet. 975 milliGRAM(s) Oral every 12 hours  aspirin  chewable 81 milliGRAM(s) Oral daily  dextrose 5%. 1000 milliLiter(s) IV Continuous <Continuous>  dextrose 50% Injectable 12.5 Gram(s) IV Push once  dextrose 50% Injectable 25 Gram(s) IV Push once  dextrose 50% Injectable 25 Gram(s) IV Push once  dextrose Gel 1 Dose(s) Oral once PRN  docusate sodium 100 milliGRAM(s) Oral three times a day  glucagon  Injectable 1 milliGRAM(s) IntraMuscular once PRN  haloperidol    Injectable 1 milliGRAM(s) IntraMuscular every 6 hours PRN  heparin  Injectable 5000 Unit(s) SubCutaneous every 8 hours  ibuprofen  Tablet 600 milliGRAM(s) Oral every 6 hours PRN  influenza   Vaccine 0.5 milliLiter(s) IntraMuscular once  insulin glargine Injectable (LANTUS) 3 Unit(s) SubCutaneous at bedtime  insulin lispro (HumaLOG) corrective regimen sliding scale   SubCutaneous three times a day before meals  ondansetron Injectable 4 milliGRAM(s) IV Push every 6 hours PRN  oxyCODONE    5 mG/acetaminophen 325 mG 2 Tablet(s) Oral every 6 hours PRN  pantoprazole    Tablet 40 milliGRAM(s) Oral daily  senna 2 Tablet(s) Oral at bedtime PRN  sodium chloride 0.9% with potassium chloride 20 mEq/L 1000 milliLiter(s) IV Continuous <Continuous>      Vital Signs Last 24 Hrs  T(C): 36.5 (20 Apr 2018 08:32), Max: 37 (19 Apr 2018 17:14)  T(F): 97.7 (20 Apr 2018 08:32), Max: 98.6 (19 Apr 2018 17:14)  HR: 93 (20 Apr 2018 08:32) (93 - 108)  BP: 148/86 (20 Apr 2018 08:32) (131/78 - 148/86)  BP(mean): --  RR: 18 (20 Apr 2018 08:32) (18 - 18)  SpO2: 97% (20 Apr 2018 08:32) (94% - 98%)    PHYSICAL EXAM:  General: [ ] non-toxic  HEAD/EYES: [ ] PERRL [x ] white sclera [ ] icterus  ENT:  [ ] normal [x ] supple [ ] thrush [ ] pharyngeal exudate  Cardiovascular:   [ ] murmur [x ] normal [ ] PPM/AICD  Respiratory:  [x ] clear to ausculation bilaterally  GI:  [ x] soft, non-tender, normal bowel sounds  :  [ ] ryan [ x] no CVA tenderness   Musculoskeletal:  [ ] no synovitis  Neurologic:  [x ] non-focal exam   Skin:  [ x] no rash  Lymph: [ ] no lymphadenopathy  Psychiatric:  [x ] appropriate affect [ ] alert & oriented  Lines:  [x ] no phlebitis [ ] central line                                9.1    14.84 )-----------( 387      ( 19 Apr 2018 09:39 )             27.3                   MICROBIOLOGY:Culture Results:   No growth (04-17-18 @ 17:06)  Culture Results:   No growth (04-17-18 @ 16:36)  Culture Results:   No growth (04-13-18 @ 15:22)      RADIOLOGY:

## 2018-04-20 NOTE — PROGRESS NOTE ADULT - PROBLEM SELECTOR PLAN 6
- L soleal DVT noted on duplex  - Cards recs appreciated: repeat US in 1 week. No need for therapeutic ac.  - Continue to monitor patient for new LE pain, swelling, erythema

## 2018-04-20 NOTE — PROGRESS NOTE BEHAVIORAL HEALTH - NSBHCHARTREVIEWVS_PSY_A_CORE FT
T(C): 36.5 (04-20-18 @ 08:32), Max: 37 (04-19-18 @ 17:14)  HR: 93 (04-20-18 @ 08:32) (93 - 108)  BP: 148/86 (04-20-18 @ 08:32) (131/78 - 148/86)  RR: 18 (04-20-18 @ 08:32) (18 - 18)  SpO2: 97% (04-20-18 @ 08:32) (94% - 98%)  Wt(kg): --

## 2018-04-20 NOTE — DISCHARGE NOTE ADULT - PATIENT PORTAL LINK FT
You can access the [facility] Patient Portal, offered by [facility], by registering with the following website: [email or web address]

## 2018-04-20 NOTE — PROGRESS NOTE BEHAVIORAL HEALTH - NSBHCONSULTFOLLOWAFTERCARE_PSY_A_CORE FT
Pt may f/u at [facility] Adult Outpatient Psychiatry Department- [phone] or [facility] Outpatient Psychiatry- [phone]

## 2018-04-21 LAB
% ALBUMIN: 52.5 % — SIGNIFICANT CHANGE UP
% ALPHA 1: 6.1 % — SIGNIFICANT CHANGE UP
% ALPHA 2: 14.3 % — SIGNIFICANT CHANGE UP
% BETA: 14.6 % — SIGNIFICANT CHANGE UP
% GAMMA: 12.5 % — SIGNIFICANT CHANGE UP
ALBUMIN SERPL ELPH-MCNC: 3.6 G/DL — SIGNIFICANT CHANGE UP (ref 3.6–5.5)
ALBUMIN/GLOB SERPL ELPH: 1.1 RATIO — SIGNIFICANT CHANGE UP
ALPHA1 GLOB SERPL ELPH-MCNC: 0.4 G/DL — SIGNIFICANT CHANGE UP (ref 0.1–0.4)
ALPHA2 GLOB SERPL ELPH-MCNC: 1 G/DL — SIGNIFICANT CHANGE UP (ref 0.5–1)
B-GLOBULIN SERPL ELPH-MCNC: 1 G/DL — SIGNIFICANT CHANGE UP (ref 0.5–1)
GAMMA GLOBULIN: 0.9 G/DL — SIGNIFICANT CHANGE UP (ref 0.6–1.6)
INTERPRETATION SERPL IFE-IMP: SIGNIFICANT CHANGE UP
PROT PATTERN SERPL ELPH-IMP: SIGNIFICANT CHANGE UP

## 2018-04-22 LAB
AUTO DIFF PNL BLD: NEGATIVE — SIGNIFICANT CHANGE UP
C-ANCA SER-ACNC: NEGATIVE — SIGNIFICANT CHANGE UP
CULTURE RESULTS: SIGNIFICANT CHANGE UP
CULTURE RESULTS: SIGNIFICANT CHANGE UP
P-ANCA SER-ACNC: ABNORMAL
P-ANCA SER-ACNC: POSITIVE
SPECIMEN SOURCE: SIGNIFICANT CHANGE UP
SPECIMEN SOURCE: SIGNIFICANT CHANGE UP

## 2018-04-23 LAB — INTERPRETATION 24H UR IFE-IMP: SIGNIFICANT CHANGE UP

## 2018-04-23 NOTE — CHART NOTE - NSCHARTNOTEFT_GEN_A_CORE
Discussed with pathology this AM, Dr. Mesa. He reviewed results of biopsy: necrotizing inflammation. He agrees w/ outpt rheum eval and also believed patient should f/u w/ ID again w/ concern of osteomyelitis. Cultures from specimen were reviewed and negative though she did receive IV ABx prior. Called Dr. Galeana from ID today to discuss, he will have his office call Ms. Nguyen to ensure f/u within the next 1-2 weeks.

## 2018-04-24 ENCOUNTER — APPOINTMENT (OUTPATIENT)
Dept: NEUROSURGERY | Facility: CLINIC | Age: 63
End: 2018-04-24

## 2018-04-25 ENCOUNTER — APPOINTMENT (OUTPATIENT)
Dept: CARDIOLOGY | Facility: CLINIC | Age: 63
End: 2018-04-25
Payer: COMMERCIAL

## 2018-04-25 VITALS
DIASTOLIC BLOOD PRESSURE: 80 MMHG | WEIGHT: 163 LBS | BODY MASS INDEX: 27.83 KG/M2 | HEIGHT: 64 IN | SYSTOLIC BLOOD PRESSURE: 120 MMHG | HEART RATE: 109 BPM | OXYGEN SATURATION: 96 %

## 2018-04-25 PROCEDURE — 99214 OFFICE O/P EST MOD 30 MIN: CPT

## 2018-04-26 ENCOUNTER — APPOINTMENT (OUTPATIENT)
Dept: NEUROSURGERY | Facility: CLINIC | Age: 63
End: 2018-04-26
Payer: COMMERCIAL

## 2018-04-26 PROCEDURE — 99024 POSTOP FOLLOW-UP VISIT: CPT

## 2018-04-26 RX ORDER — PENICILLIN V POTASSIUM 500 MG/1
500 TABLET, FILM COATED ORAL
Qty: 21 | Refills: 0 | Status: COMPLETED | COMMUNITY
Start: 2017-11-28

## 2018-05-01 ENCOUNTER — RX RENEWAL (OUTPATIENT)
Age: 63
End: 2018-05-01

## 2018-05-01 ENCOUNTER — APPOINTMENT (OUTPATIENT)
Dept: OPHTHALMOLOGY | Facility: CLINIC | Age: 63
End: 2018-05-01
Payer: COMMERCIAL

## 2018-05-01 DIAGNOSIS — F17.200 NICOTINE DEPENDENCE, UNSPECIFIED, UNCOMPLICATED: ICD-10-CM

## 2018-05-01 DIAGNOSIS — Z87.39 PERSONAL HISTORY OF OTHER DISEASES OF THE MUSCULOSKELETAL SYSTEM AND CONNECTIVE TISSUE: ICD-10-CM

## 2018-05-01 DIAGNOSIS — Z82.61 FAMILY HISTORY OF ARTHRITIS: ICD-10-CM

## 2018-05-01 DIAGNOSIS — Z82.62 FAMILY HISTORY OF OSTEOPOROSIS: ICD-10-CM

## 2018-05-01 DIAGNOSIS — Z78.9 OTHER SPECIFIED HEALTH STATUS: ICD-10-CM

## 2018-05-01 DIAGNOSIS — H47.20 UNSPECIFIED OPTIC ATROPHY: ICD-10-CM

## 2018-05-01 PROCEDURE — 99204 OFFICE O/P NEW MOD 45 MIN: CPT

## 2018-05-01 PROCEDURE — 92083 EXTENDED VISUAL FIELD XM: CPT

## 2018-05-01 PROCEDURE — 92133 CPTRZD OPH DX IMG PST SGM ON: CPT

## 2018-05-03 ENCOUNTER — APPOINTMENT (OUTPATIENT)
Dept: RHEUMATOLOGY | Facility: CLINIC | Age: 63
End: 2018-05-03

## 2018-05-07 ENCOUNTER — APPOINTMENT (OUTPATIENT)
Dept: INFECTIOUS DISEASE | Facility: CLINIC | Age: 63
End: 2018-05-07
Payer: COMMERCIAL

## 2018-05-07 VITALS
BODY MASS INDEX: 28.34 KG/M2 | DIASTOLIC BLOOD PRESSURE: 76 MMHG | OXYGEN SATURATION: 100 % | RESPIRATION RATE: 16 BRPM | SYSTOLIC BLOOD PRESSURE: 138 MMHG | TEMPERATURE: 97.2 F | HEART RATE: 98 BPM | HEIGHT: 64 IN | WEIGHT: 166 LBS

## 2018-05-07 PROCEDURE — 99214 OFFICE O/P EST MOD 30 MIN: CPT

## 2018-05-09 ENCOUNTER — APPOINTMENT (OUTPATIENT)
Dept: ULTRASOUND IMAGING | Facility: CLINIC | Age: 63
End: 2018-05-09
Payer: COMMERCIAL

## 2018-05-09 ENCOUNTER — APPOINTMENT (OUTPATIENT)
Dept: INTERNAL MEDICINE | Facility: CLINIC | Age: 63
End: 2018-05-09
Payer: COMMERCIAL

## 2018-05-09 ENCOUNTER — OUTPATIENT (OUTPATIENT)
Dept: OUTPATIENT SERVICES | Facility: HOSPITAL | Age: 63
LOS: 1 days | End: 2018-05-09
Payer: COMMERCIAL

## 2018-05-09 VITALS
OXYGEN SATURATION: 96 % | SYSTOLIC BLOOD PRESSURE: 124 MMHG | WEIGHT: 152 LBS | HEIGHT: 59.5 IN | HEART RATE: 102 BPM | DIASTOLIC BLOOD PRESSURE: 62 MMHG | BODY MASS INDEX: 30.24 KG/M2

## 2018-05-09 DIAGNOSIS — R73.09 OTHER ABNORMAL GLUCOSE: ICD-10-CM

## 2018-05-09 DIAGNOSIS — I82.402 ACUTE EMBOLISM AND THROMBOSIS OF UNSPECIFIED DEEP VEINS OF LEFT LOWER EXTREMITY: ICD-10-CM

## 2018-05-09 DIAGNOSIS — Z82.49 FAMILY HISTORY OF ISCHEMIC HEART DISEASE AND OTHER DISEASES OF THE CIRCULATORY SYSTEM: ICD-10-CM

## 2018-05-09 DIAGNOSIS — T14.8XXA OTHER INJURY OF UNSPECIFIED BODY REGION, INITIAL ENCOUNTER: ICD-10-CM

## 2018-05-09 LAB
BASOPHILS # BLD AUTO: 0.03 K/UL
BASOPHILS NFR BLD AUTO: 0.3 %
CRP SERPL-MCNC: 3.5 MG/DL
EOSINOPHIL # BLD AUTO: 0.22 K/UL
EOSINOPHIL NFR BLD AUTO: 1.9 %
ERYTHROCYTE [SEDIMENTATION RATE] IN BLOOD BY WESTERGREN METHOD: 65 MM/HR
HCT VFR BLD CALC: 34.3 %
HGB BLD-MCNC: 10.9 G/DL
IMM GRANULOCYTES NFR BLD AUTO: 0.3 %
LYMPHOCYTES # BLD AUTO: 2.74 K/UL
LYMPHOCYTES NFR BLD AUTO: 23.9 %
MAN DIFF?: NORMAL
MCHC RBC-ENTMCNC: 30.8 PG
MCHC RBC-ENTMCNC: 31.8 GM/DL
MCV RBC AUTO: 96.9 FL
MONOCYTES # BLD AUTO: 0.71 K/UL
MONOCYTES NFR BLD AUTO: 6.2 %
NEUTROPHILS # BLD AUTO: 7.74 K/UL
NEUTROPHILS NFR BLD AUTO: 67.4 %
PLATELET # BLD AUTO: 601 K/UL
PROCALCITONIN SERPL-MCNC: <0.05 NG/ML
RBC # BLD: 3.54 M/UL
RBC # FLD: 13.8 %
WBC # FLD AUTO: 11.48 K/UL

## 2018-05-09 PROCEDURE — 93970 EXTREMITY STUDY: CPT

## 2018-05-09 PROCEDURE — 93970 EXTREMITY STUDY: CPT | Mod: 26

## 2018-05-09 PROCEDURE — 99203 OFFICE O/P NEW LOW 30 MIN: CPT

## 2018-05-11 ENCOUNTER — APPOINTMENT (OUTPATIENT)
Dept: NEUROSURGERY | Facility: CLINIC | Age: 63
End: 2018-05-11
Payer: COMMERCIAL

## 2018-05-11 PROCEDURE — 99024 POSTOP FOLLOW-UP VISIT: CPT

## 2018-05-11 RX ORDER — OXYCODONE AND ACETAMINOPHEN 5; 325 MG/1; MG/1
5-325 TABLET ORAL
Qty: 30 | Refills: 0 | Status: DISCONTINUED | COMMUNITY
Start: 2018-02-16 | End: 2018-05-11

## 2018-05-11 RX ORDER — HYDROMORPHONE HYDROCHLORIDE 2 MG/1
2 TABLET ORAL
Qty: 4 | Refills: 0 | Status: DISCONTINUED | COMMUNITY
Start: 2018-04-10 | End: 2018-05-11

## 2018-05-11 RX ORDER — PREDNISONE 20 MG/1
20 TABLET ORAL DAILY
Qty: 18 | Refills: 0 | Status: COMPLETED | COMMUNITY
Start: 2017-05-08 | End: 2018-05-11

## 2018-05-11 RX ORDER — TRAMADOL HYDROCHLORIDE 50 MG/1
50 TABLET, COATED ORAL
Qty: 40 | Refills: 0 | Status: DISCONTINUED | COMMUNITY
Start: 2018-01-26 | End: 2018-05-11

## 2018-05-11 RX ORDER — CEPHALEXIN 500 MG/1
500 CAPSULE ORAL
Qty: 14 | Refills: 0 | Status: DISCONTINUED | COMMUNITY
Start: 2018-05-01 | End: 2018-05-11

## 2018-05-12 LAB
CULTURE RESULTS: SIGNIFICANT CHANGE UP
SPECIMEN SOURCE: SIGNIFICANT CHANGE UP

## 2018-05-15 ENCOUNTER — APPOINTMENT (OUTPATIENT)
Dept: NEUROSURGERY | Facility: CLINIC | Age: 63
End: 2018-05-15
Payer: COMMERCIAL

## 2018-05-15 ENCOUNTER — APPOINTMENT (OUTPATIENT)
Dept: RHEUMATOLOGY | Facility: CLINIC | Age: 63
End: 2018-05-15

## 2018-05-15 VITALS
BODY MASS INDEX: 30.24 KG/M2 | HEIGHT: 59.5 IN | WEIGHT: 152 LBS | HEART RATE: 108 BPM | SYSTOLIC BLOOD PRESSURE: 135 MMHG | DIASTOLIC BLOOD PRESSURE: 74 MMHG

## 2018-05-15 PROCEDURE — 99024 POSTOP FOLLOW-UP VISIT: CPT

## 2018-05-20 PROBLEM — Z82.49 FAMILY HISTORY OF CARDIAC DISORDER: Status: ACTIVE | Noted: 2018-05-20

## 2018-05-20 PROBLEM — T14.8XXA WOUND DRAINAGE: Status: ACTIVE | Noted: 2018-05-01

## 2018-05-20 PROBLEM — R73.09 HEMOGLOBIN A1C BETWEEN 7.0% AND 9.0%: Status: ACTIVE | Noted: 2018-05-20

## 2018-05-20 RX ORDER — MELOXICAM 15 MG/1
15 TABLET ORAL
Qty: 30 | Refills: 0 | Status: DISCONTINUED | COMMUNITY
Start: 2017-11-06 | End: 2018-05-20

## 2018-05-20 RX ORDER — ETODOLAC 400 MG/1
400 TABLET, FILM COATED ORAL TWICE DAILY
Qty: 42 | Refills: 0 | Status: DISCONTINUED | COMMUNITY
Start: 2017-05-19 | End: 2018-05-20

## 2018-05-22 ENCOUNTER — APPOINTMENT (OUTPATIENT)
Age: 63
End: 2018-05-22
Payer: COMMERCIAL

## 2018-05-22 VITALS
HEIGHT: 59.5 IN | BODY MASS INDEX: 29.44 KG/M2 | HEART RATE: 93 BPM | SYSTOLIC BLOOD PRESSURE: 122 MMHG | WEIGHT: 148 LBS | OXYGEN SATURATION: 96 % | DIASTOLIC BLOOD PRESSURE: 68 MMHG

## 2018-05-22 DIAGNOSIS — R60.0 LOCALIZED EDEMA: ICD-10-CM

## 2018-05-22 DIAGNOSIS — M51.36 OTHER INTERVERTEBRAL DISC DEGENERATION, LUMBAR REGION: ICD-10-CM

## 2018-05-22 DIAGNOSIS — M89.9 DISORDER OF BONE, UNSPECIFIED: ICD-10-CM

## 2018-05-22 PROCEDURE — 99213 OFFICE O/P EST LOW 20 MIN: CPT

## 2018-05-29 ENCOUNTER — APPOINTMENT (OUTPATIENT)
Dept: INFECTIOUS DISEASE | Facility: CLINIC | Age: 63
End: 2018-05-29
Payer: COMMERCIAL

## 2018-05-29 ENCOUNTER — OTHER (OUTPATIENT)
Age: 63
End: 2018-05-29

## 2018-05-29 VITALS
OXYGEN SATURATION: 93 % | DIASTOLIC BLOOD PRESSURE: 71 MMHG | WEIGHT: 152 LBS | RESPIRATION RATE: 16 BRPM | HEART RATE: 99 BPM | TEMPERATURE: 97.5 F | BODY MASS INDEX: 30.24 KG/M2 | HEIGHT: 59.5 IN | SYSTOLIC BLOOD PRESSURE: 111 MMHG

## 2018-05-29 PROCEDURE — 99214 OFFICE O/P EST MOD 30 MIN: CPT

## 2018-05-30 ENCOUNTER — CLINICAL ADVICE (OUTPATIENT)
Age: 63
End: 2018-05-30

## 2018-05-30 LAB
BASOPHILS # BLD AUTO: 0.04 K/UL
BASOPHILS NFR BLD AUTO: 0.3 %
CRP SERPL-MCNC: 6.1 MG/DL
EOSINOPHIL # BLD AUTO: 0.28 K/UL
EOSINOPHIL NFR BLD AUTO: 2 %
ERYTHROCYTE [SEDIMENTATION RATE] IN BLOOD BY WESTERGREN METHOD: 25 MM/HR
HCT VFR BLD CALC: 33.5 %
HGB BLD-MCNC: 11.1 G/DL
IMM GRANULOCYTES NFR BLD AUTO: 0.3 %
LYMPHOCYTES # BLD AUTO: 1.48 K/UL
LYMPHOCYTES NFR BLD AUTO: 10.4 %
MAN DIFF?: NORMAL
MCHC RBC-ENTMCNC: 30.7 PG
MCHC RBC-ENTMCNC: 33.1 GM/DL
MCV RBC AUTO: 92.8 FL
MONOCYTES # BLD AUTO: 0.8 K/UL
MONOCYTES NFR BLD AUTO: 5.6 %
NEUTROPHILS # BLD AUTO: 11.61 K/UL
NEUTROPHILS NFR BLD AUTO: 81.4 %
PLATELET # BLD AUTO: 581 K/UL
PROCALCITONIN SERPL-MCNC: 0.06 NG/ML
RBC # BLD: 3.61 M/UL
RBC # FLD: 13.9 %
WBC # FLD AUTO: 14.25 K/UL

## 2018-06-05 ENCOUNTER — FORM ENCOUNTER (OUTPATIENT)
Age: 63
End: 2018-06-05

## 2018-06-05 ENCOUNTER — CLINICAL ADVICE (OUTPATIENT)
Age: 63
End: 2018-06-05

## 2018-06-06 ENCOUNTER — APPOINTMENT (OUTPATIENT)
Dept: MRI IMAGING | Facility: CLINIC | Age: 63
End: 2018-06-06
Payer: COMMERCIAL

## 2018-06-06 ENCOUNTER — OUTPATIENT (OUTPATIENT)
Dept: OUTPATIENT SERVICES | Facility: HOSPITAL | Age: 63
LOS: 1 days | End: 2018-06-06
Payer: COMMERCIAL

## 2018-06-06 DIAGNOSIS — M54.16 RADICULOPATHY, LUMBAR REGION: ICD-10-CM

## 2018-06-06 DIAGNOSIS — M51.36 OTHER INTERVERTEBRAL DISC DEGENERATION, LUMBAR REGION: ICD-10-CM

## 2018-06-06 PROCEDURE — 82565 ASSAY OF CREATININE: CPT

## 2018-06-06 PROCEDURE — A9585: CPT

## 2018-06-06 PROCEDURE — 72158 MRI LUMBAR SPINE W/O & W/DYE: CPT | Mod: 26

## 2018-06-06 PROCEDURE — 72158 MRI LUMBAR SPINE W/O & W/DYE: CPT

## 2018-06-07 ENCOUNTER — APPOINTMENT (OUTPATIENT)
Dept: INTERNAL MEDICINE | Facility: CLINIC | Age: 63
End: 2018-06-07

## 2018-06-07 ENCOUNTER — APPOINTMENT (OUTPATIENT)
Dept: INTERNAL MEDICINE | Facility: CLINIC | Age: 63
End: 2018-06-07
Payer: COMMERCIAL

## 2018-06-07 VITALS — WEIGHT: 152 LBS | BODY MASS INDEX: 30.19 KG/M2

## 2018-06-07 DIAGNOSIS — I82.402 ACUTE EMBOLISM AND THROMBOSIS OF UNSPECIFIED DEEP VEINS OF LEFT LOWER EXTREMITY: ICD-10-CM

## 2018-06-07 PROCEDURE — 99214 OFFICE O/P EST MOD 30 MIN: CPT

## 2018-06-08 NOTE — HISTORY OF PRESENT ILLNESS
[FreeTextEntry8] : Pt is here with her  reporting acute worsening of her left leg swelling over the past 7 days. She also reports that the leg is heavy and difficult to move. She tried increased HCTZ for presumed fluid but it has not helped. Her last ultrasound in May 2018 had shown improvement in her left soleal DVT. \par \par pt also had an MRI spine done on 6/6/18 showing a new spinal fliud collection 3.2x2.6x7.2cm and possible complex epidural abscess at L3-L5. Pt DOES feel her left leg is weak. she denied any fevers/CP/SOB.

## 2018-06-08 NOTE — REVIEW OF SYSTEMS
[Fever] : no fever [Chest Pain] : no chest pain [Shortness Of Breath] : no shortness of breath [de-identified] : weakness of left leg

## 2018-06-08 NOTE — PHYSICAL EXAM
[No Acute Distress] : no acute distress [Supple] : supple [No Respiratory Distress] : no respiratory distress  [Clear to Auscultation] : lungs were clear to auscultation bilaterally [No Accessory Muscle Use] : no accessory muscle use [Normal Rate] : normal rate  [Regular Rhythm] : with a regular rhythm [Normal S1, S2] : normal S1 and S2 [Soft] : abdomen soft [Non Tender] : non-tender [Normal Bowel Sounds] : normal bowel sounds [Speech Grossly Normal] : speech grossly normal [Normal Affect] : the affect was normal [Alert and Oriented x3] : oriented to person, place, and time [Normal Mood] : the mood was normal [Normal Insight/Judgement] : insight and judgment were intact [de-identified] : LEFT LEG Calf tenderness and leg swelling noted [de-identified] : mild redness of the left leg noted [de-identified] : LEFT LEG is weak - pt unable to extend the left leg off against gravity

## 2018-06-08 NOTE — ADDENDUM
[FreeTextEntry1] : 1. I just called the patient to see which ED she went to at around 8:55am on 6/8/18 and there was no answer on the home phone or cellphone. I left a voicemail on the cellphone instructing patient to call us back and inform us which ED she went to and stating if she did not go to the ED re-iterating that she is at risk for a life threatening issue and to go to ED stat and call 911.\par \par Update 6/8/18 at 12:15pm - I just had a lengthy discussion with the pt. She reports she did not go to ED yesterday as advised because she felt "tired". She has been icing the left leg. We discussed that again she may have a life threatening condition which may be an infection or could be something non infectious and that she requires inpatient ED evaluation now and that something can be missed or she can suddenly deteriorate if she does not go. Pt expressed full understanding and states she will call her sister now to take her to [facility]. Advised pt if any issue being taken to the hospital to simply call 911 for assistance. Pt expressed full understanding and agreement with this plan and states she will go now and that she will notify us when she gets there. Pt will f/u with us post discharge. Urged the pt that if she needs to be admitted to allow the inpatient team to monitor her.

## 2018-06-11 ENCOUNTER — CLINICAL ADVICE (OUTPATIENT)
Age: 63
End: 2018-06-11

## 2018-06-13 ENCOUNTER — EMERGENCY (EMERGENCY)
Facility: HOSPITAL | Age: 63
LOS: 1 days | Discharge: HOME | End: 2018-06-13
Attending: EMERGENCY MEDICINE
Payer: COMMERCIAL

## 2018-06-13 VITALS
TEMPERATURE: 98 F | SYSTOLIC BLOOD PRESSURE: 125 MMHG | OXYGEN SATURATION: 98 % | RESPIRATION RATE: 16 BRPM | DIASTOLIC BLOOD PRESSURE: 70 MMHG | HEART RATE: 70 BPM

## 2018-06-13 VITALS
DIASTOLIC BLOOD PRESSURE: 83 MMHG | OXYGEN SATURATION: 96 % | SYSTOLIC BLOOD PRESSURE: 144 MMHG | HEART RATE: 94 BPM | TEMPERATURE: 99 F | RESPIRATION RATE: 20 BRPM

## 2018-06-13 LAB
ALBUMIN SERPL ELPH-MCNC: 3.8 G/DL — SIGNIFICANT CHANGE UP (ref 3.3–5)
ALP SERPL-CCNC: 126 U/L — HIGH (ref 40–120)
ALT FLD-CCNC: 18 U/L — SIGNIFICANT CHANGE UP (ref 10–45)
ANION GAP SERPL CALC-SCNC: 10 MMOL/L — SIGNIFICANT CHANGE UP (ref 5–17)
APTT BLD: 34.4 SEC — SIGNIFICANT CHANGE UP (ref 27.5–37.4)
AST SERPL-CCNC: 56 U/L — HIGH (ref 10–40)
BASOPHILS # BLD AUTO: 0 K/UL — SIGNIFICANT CHANGE UP (ref 0–0.2)
BASOPHILS NFR BLD AUTO: 0.3 % — SIGNIFICANT CHANGE UP (ref 0–2)
BILIRUB SERPL-MCNC: 0.3 MG/DL — SIGNIFICANT CHANGE UP (ref 0.2–1.2)
BUN SERPL-MCNC: 15 MG/DL — SIGNIFICANT CHANGE UP (ref 7–23)
CALCIUM SERPL-MCNC: 9.3 MG/DL — SIGNIFICANT CHANGE UP (ref 8.4–10.5)
CHLORIDE SERPL-SCNC: 86 MMOL/L — LOW (ref 96–108)
CO2 SERPL-SCNC: 22 MMOL/L — SIGNIFICANT CHANGE UP (ref 22–31)
CREAT SERPL-MCNC: 0.46 MG/DL — LOW (ref 0.5–1.3)
EOSINOPHIL # BLD AUTO: 0.3 K/UL — SIGNIFICANT CHANGE UP (ref 0–0.5)
EOSINOPHIL NFR BLD AUTO: 2.2 % — SIGNIFICANT CHANGE UP (ref 0–6)
GLUCOSE SERPL-MCNC: 154 MG/DL — HIGH (ref 70–99)
HCT VFR BLD CALC: 31.9 % — LOW (ref 34.5–45)
HGB BLD-MCNC: 10.8 G/DL — LOW (ref 11.5–15.5)
INR BLD: 1.04 RATIO — SIGNIFICANT CHANGE UP (ref 0.88–1.16)
LYMPHOCYTES # BLD AUTO: 17.3 % — SIGNIFICANT CHANGE UP (ref 13–44)
LYMPHOCYTES # BLD AUTO: 2.2 K/UL — SIGNIFICANT CHANGE UP (ref 1–3.3)
MCHC RBC-ENTMCNC: 31.2 PG — SIGNIFICANT CHANGE UP (ref 27–34)
MCHC RBC-ENTMCNC: 33.9 GM/DL — SIGNIFICANT CHANGE UP (ref 32–36)
MCV RBC AUTO: 92.1 FL — SIGNIFICANT CHANGE UP (ref 80–100)
MONOCYTES # BLD AUTO: 0.9 K/UL — SIGNIFICANT CHANGE UP (ref 0–0.9)
MONOCYTES NFR BLD AUTO: 7 % — SIGNIFICANT CHANGE UP (ref 2–14)
NEUTROPHILS # BLD AUTO: 9.3 K/UL — HIGH (ref 1.8–7.4)
NEUTROPHILS NFR BLD AUTO: 73.2 % — SIGNIFICANT CHANGE UP (ref 43–77)
PLATELET # BLD AUTO: 531 K/UL — HIGH (ref 150–400)
POTASSIUM SERPL-MCNC: 6.3 MMOL/L — CRITICAL HIGH (ref 3.5–5.3)
POTASSIUM SERPL-SCNC: 6.3 MMOL/L — CRITICAL HIGH (ref 3.5–5.3)
PROT SERPL-MCNC: 8.2 G/DL — SIGNIFICANT CHANGE UP (ref 6–8.3)
PROTHROM AB SERPL-ACNC: 11.2 SEC — SIGNIFICANT CHANGE UP (ref 9.8–12.7)
RBC # BLD: 3.47 M/UL — LOW (ref 3.8–5.2)
RBC # FLD: 12.7 % — SIGNIFICANT CHANGE UP (ref 10.3–14.5)
SODIUM SERPL-SCNC: 128 MMOL/L — LOW (ref 135–145)
WBC # BLD: 12.7 K/UL — HIGH (ref 3.8–10.5)
WBC # FLD AUTO: 12.7 K/UL — HIGH (ref 3.8–10.5)

## 2018-06-13 PROCEDURE — 70450 CT HEAD/BRAIN W/O DYE: CPT | Mod: 26

## 2018-06-13 PROCEDURE — 80053 COMPREHEN METABOLIC PANEL: CPT

## 2018-06-13 PROCEDURE — 93970 EXTREMITY STUDY: CPT

## 2018-06-13 PROCEDURE — 93970 EXTREMITY STUDY: CPT | Mod: 26

## 2018-06-13 PROCEDURE — 70486 CT MAXILLOFACIAL W/O DYE: CPT | Mod: 26

## 2018-06-13 PROCEDURE — 70486 CT MAXILLOFACIAL W/O DYE: CPT

## 2018-06-13 PROCEDURE — 85610 PROTHROMBIN TIME: CPT

## 2018-06-13 PROCEDURE — 99284 EMERGENCY DEPT VISIT MOD MDM: CPT

## 2018-06-13 PROCEDURE — 70450 CT HEAD/BRAIN W/O DYE: CPT

## 2018-06-13 PROCEDURE — 85027 COMPLETE CBC AUTOMATED: CPT

## 2018-06-13 PROCEDURE — 85730 THROMBOPLASTIN TIME PARTIAL: CPT

## 2018-06-13 PROCEDURE — 99284 EMERGENCY DEPT VISIT MOD MDM: CPT | Mod: 25

## 2018-06-13 NOTE — ED ADULT NURSE NOTE - EXPLANATION OF PATIENT'S REASON FOR LEAVING
"cannot sleep in the hospital" will return in the morning. ama risks discussed by Dr Carlisle to patient.

## 2018-06-13 NOTE — ED ADULT NURSE NOTE - OBJECTIVE STATEMENT
62 y.o. Female presents to the ED for drainage of laminectomy site in lower lumbar area. A&Ox3. Hx spinal stenosis, RA, torn meniscus. +2 swelling noted on L foot and ankle - as per patient, her L foot has been getting swollen ever since the laminectomy surgery on April 2017 d/t spinal stenosis. +sensation b/l. Laminectomy site not actively draining - covered in gauze and tape. Pt reports getting an MRI last week and her MD told her there is fluid left inside and to come to ED to get it drained. Denies numbness/tingling, CP, SOB, N/V/D, urinary/bowel complications, fever/chills. Ecchymosis noted on L orbital area. Pt reports having increasing falls since this weekend because pt has not been doing her leg exercises. Denies LOC. Pt states she has to use a walker to ambulate to decrease falls. Pt is in no current distress. Comfort and safety provided. Will continue to monitor. Awaiting ED MD consult. 62 y.o. Female presents to the ED for drainage of laminectomy site in lower lumbar area. A&Ox3. Hx spinal stenosis, RA, torn meniscus. +2 swelling noted on L foot and ankle - as per patient, her L foot has been getting swollen ever since the laminectomy surgery on April 2017 d/t spinal stenosis. +sensation b/l. Laminectomy site not actively draining, yellow area noted  - covered in gauze and tape. Pt reports getting an MRI last week and her MD told her there is fluid left inside and to come to ED to get it drained. Denies numbness/tingling, CP, SOB, N/V/D, urinary/bowel complications, fever/chills. Ecchymosis noted on L orbital area. Pt reports having increasing falls since this weekend because pt has not been doing her leg exercises. Denies LOC. Pt states she has to use a walker to ambulate to decrease falls. Pt is in no current distress. Comfort and safety provided. Will continue to monitor. Awaiting ED MD consult.

## 2018-06-13 NOTE — CONSULT NOTE ADULT - ASSESSMENT
62F with recent MRI demonstrating soft tissue abscess at previous lumbar surgical site. She denies systemic symptoms. Recommended admission and wound washout with post-operative IV Abx, however patient refusing admission. Informed patient of risks of leaving without treatment, including wound breakdown, sepsis, death. She expressed understanding and plans on leaving AMA.

## 2018-06-13 NOTE — ED PROVIDER NOTE - PROGRESS NOTE DETAILS
Attending MD Carlisle: Seen by neurosx Dr Carcamo, recommended admission tonight for wash out in AM, patient reported refused wants to go home, pending U/S now, will reassess Attending MD Carlisle: U/S reviewed with patient, neurosx recs reviewed with patient.  Labs still pending.  Wants to leave.  Asked why she was not willing to stay reports because she cannot sleep well in the hospital.  Reports will return tomorrow.  The patient want to leave the hospital against medical advice.  The patient understands the risks, benefits and alternatives of this decision.  The risks of worsening infection, spread of infection to blood stream (bacteremia/sepsis), spinal cord injury, peramanent paralysis, permanent disability, death explained to patient and the patient demonstrated understanding of these risks.  The patient was instructed to return to hospital ASAP. Attending MD Carlisle: While patient was still here, K of 6.3 returned (nonhemolyzed).  Again asked patient to please stay for further evaluation, explained risk of arrythmia, requested for her to at least stay for an EKG.  Patient refused.  Explained risk of arrythmia, heart attack, death,  again verbalized understanding.  Left hospital AMA.  Said she would be back tomorrow but now she "could eat a horse" and "is tired" and wants to go home.

## 2018-06-13 NOTE — ED PROVIDER NOTE - ATTENDING CONTRIBUTION TO CARE
Attending MD Carlisle: I personally have seen and examined this patient.  Resident note reviewed and agree on plan of care and except where noted.  See below for details.     62F with PMH including lumbar disc disease, fibromyalgia presents to the ED sent in by neurosurgeon for collection on MRI.  Reports that she had excision of epidural mass on 4/7/18 and has been having worsening lower extremity edema over the last week.  Reports mild numbness and pain now developing over the lower extremities, L>R.  Reports also recent fall.   Denies LOC, reports h ead trauma as hit her face.  Denies loss of urinary or bowel continence. Denies chest pain, shortness of breath, palpitations. Denies fevers, chills. Denies abdominal pain, nausea, vomiting, diarrhea, blood in stools. On exam, head NCAT, PERRL, FROM at neck, +tenderness to LS midline spine, superior aspect of surgical scar healing well, C/D/I, inferior aspect with seropurulent drainage, surgical scar along length of LS spine, lungs CTAB with good inspiratory effort, +S1S2, no m/r/g, abdomen soft with +BS, NT, ND, no CVAT, moving all extremities with 5/5 strength bilateral upper and 5/5 R and 4/5 L lower extremities, good and equal  strength bilaterally; A/P: 62F sent in for collection on imaging, A/P: 62F with discharge from wound, will obtain pre op labs, neurosx consult, CT head, CT max/face, U/S duplex bilaterally, reassess, admit

## 2018-06-13 NOTE — CONSULT NOTE ADULT - SUBJECTIVE AND OBJECTIVE BOX
Pager: [phone]     HPI: 62F s/p L3-5 lami for resection of epidural mass 4/17/18 (Final pathology: Necrotizing inflammation) p/w lower extremity edema bilaterally. Patient had recent MRI L-spine which demonstrated ring enhancing collection at surgical site on 6/6/18. She was recommended that the time to come to ER for evaluation. Patient complains of drainage of incision but it is unclear why she waited to come to ER. Patient denies headache, nausea, vomiting, numbness, tingling, fever. She also has baseline LLE weakness.    PAST MEDICAL HISTORY   Herniated disc, cervical  Spinal stenosis  Fibromyalgia    PAST SURGICAL HISTORY   No significant past surgical history    ALLERGY  No Known Allergies    FAMILY HISTORY:  No pertinent family history in first degree relatives    REVIEW OF SYSTEMS:  Check here if all are normal other than Neurological []  General:  Eyes:  ENT:  Cardiac:  Respiratory:  GI:  Musculoskeletal:   Skin: BLE edema  Neurologic:   Psychiatric:     PHYSICAL EXAM:    Constitutional: No Acute Distress     Neurological: AOx3, Following Commands    Motor exam:          Upper extremity                         Delt     Bicep     Tricep    HG                                                 R         5/5        5/5        5/5       5/5                                               L          5/5        5/5        5/5       5/5          Lower extremity                        HF         KF        KE       DF         PF                                                  R        5/5        5/5        5/5       5/5         5/5                                               L         4/5        4/5       4/5       4/5          4/5                                                 Sensation: [x] intact to light touch  [] decreased:     No clonus    Incision: erythematous, superior portion healed and intact, inferior dehiscence with pus    LABS:                        10.8   12.7  )-----------( 531      ( 13 Jun 2018 21:44 )             31.9           PT/INR - ( 13 Jun 2018 21:44 )   PT: 11.2 sec;   INR: 1.04 ratio         PTT - ( 13 Jun 2018 21:44 )  PTT:34.4 sec

## 2018-06-13 NOTE — ED PROVIDER NOTE - OBJECTIVE STATEMENT
62y f w PMHx lumbar disc dx, fibromyalgia, recent excision of epidural mass by NS on 4/7, p/w LE swelling over the last week. States that she had LE swelling with some accompanying pain and numbness b/l but worse on the left; her symptoms are now improving and have fully resolved on the right, but she continues to have some symptoms on the left. Denies f/c/n/v/d. Denies new rash, although her LLE has some mild erythema. Nontender to superficial touch, mild tenderness to L calf palpation. 62y f w PMHx lumbar disc dx, fibromyalgia, recent excision of epidural mass by NS on 4/7, p/w LE swelling over the last week. States that she had LE swelling with some accompanying pain and numbness b/l but worse on the left; her symptoms are now improving and have fully resolved on the right, but she continues to have some symptoms on the left. Denies f/c/n/v/d. Denies new rash, although she has some mild erythema and warmth on bilateral LEs. Nontender to superficial touch, mild tenderness to L calf palpation. She reports a fall out of bed that has resulted in some bruising around her face.

## 2018-06-13 NOTE — ED PROVIDER NOTE - MEDICAL DECISION MAKING DETAILS
2y f w PMHx lumbar disc dx, fibromyalgia, recent excision of epidural mass by NS on 4/7, p/w LE swelling over the last week. Mild warmth and erythema of b/l LE, LLE appears swollen compared to R. Will obtain DVT studies of both LE, CT head due to fall, cont to reassess 62y f w PMHx lumbar disc dx, fibromyalgia, recent excision of epidural mass by NS on 4/7, p/w LE swelling over the last week. Mild warmth and erythema of b/l LE, LLE appears swollen compared to R. Will obtain DVT studies of both LE, CT head due to fall, cont to reassess

## 2018-06-13 NOTE — ED ADULT NURSE REASSESSMENT NOTE - NS ED NURSE REASSESS COMMENT FT1
2200- patient refused type & screen after coming back from [place]. "I'm going home< will come back tomorrow.
2000- (+) LLE swollen & red with pain to calf to pressure. (+) pulses. (+) warm to touch.

## 2018-06-14 ENCOUNTER — EMERGENCY (EMERGENCY)
Facility: HOSPITAL | Age: 63
LOS: 1 days | Discharge: HOME | End: 2018-06-14
Attending: EMERGENCY MEDICINE
Payer: COMMERCIAL

## 2018-06-14 VITALS
DIASTOLIC BLOOD PRESSURE: 80 MMHG | TEMPERATURE: 98 F | HEART RATE: 109 BPM | OXYGEN SATURATION: 99 % | RESPIRATION RATE: 15 BRPM | SYSTOLIC BLOOD PRESSURE: 117 MMHG

## 2018-06-14 VITALS
TEMPERATURE: 98 F | DIASTOLIC BLOOD PRESSURE: 82 MMHG | WEIGHT: 151.9 LBS | RESPIRATION RATE: 18 BRPM | OXYGEN SATURATION: 98 % | SYSTOLIC BLOOD PRESSURE: 113 MMHG | HEART RATE: 119 BPM | HEIGHT: 64 IN

## 2018-06-14 PROCEDURE — 99284 EMERGENCY DEPT VISIT MOD MDM: CPT

## 2018-06-14 PROCEDURE — 99283 EMERGENCY DEPT VISIT LOW MDM: CPT

## 2018-06-14 RX ORDER — OXYCODONE HYDROCHLORIDE 5 MG/1
5 TABLET ORAL ONCE
Qty: 0 | Refills: 0 | Status: DISCONTINUED | OUTPATIENT
Start: 2018-06-14 | End: 2018-06-14

## 2018-06-14 RX ORDER — MOXIFLOXACIN HYDROCHLORIDE TABLETS, 400 MG 400 MG/1
1 TABLET, FILM COATED ORAL
Qty: 20 | Refills: 0 | OUTPATIENT
Start: 2018-06-14 | End: 2018-06-23

## 2018-06-14 RX ORDER — CIPROFLOXACIN LACTATE 400MG/40ML
500 VIAL (ML) INTRAVENOUS ONCE
Qty: 0 | Refills: 0 | Status: COMPLETED | OUTPATIENT
Start: 2018-06-14 | End: 2018-06-14

## 2018-06-14 RX ADMIN — OXYCODONE HYDROCHLORIDE 5 MILLIGRAM(S): 5 TABLET ORAL at 15:45

## 2018-06-14 RX ADMIN — Medication 1 TABLET(S): at 18:50

## 2018-06-14 RX ADMIN — OXYCODONE HYDROCHLORIDE 5 MILLIGRAM(S): 5 TABLET ORAL at 16:15

## 2018-06-14 RX ADMIN — Medication 500 MILLIGRAM(S): at 18:50

## 2018-06-14 NOTE — CONSULT NOTE ADULT - SUBJECTIVE AND OBJECTIVE BOX
Pager: [phone]  CHIEF COMPLAINT/ REASON FOR CONSULTATION:    incision leaking     HPI:  62 year old female with pmhx of lumbar disc dx, fibromyalgia, recent excision of epidural mass by NS on 4/7 presents with possible drainage of surgical site from laminectomy. Went to see Dr. Rea's partner yesterday and was told be admitted to have drainage performed. Was recently seen in ED yesterday for leg swelling that has been improving. Had normal blood work and sonogram done. Denies vomiting or nausea. Denies fever or chills. Patient complains of being uncomfortable where sutures were placed on the back. Patient also reports falls in the last week and has facial bruising.    She had an MRI on 6/6 which showed an enhancing collection, suspicious for possible underlying infection. She was told to come to the emergency department to be admitted, however refused admission.     PAST MEDICAL HISTORY   Herniated disc, cervical  Spinal stenosis  Fibromyalgia    PAST SURGICAL HISTORY   No significant past surgical history        MEDICATIONS:  Antibiotics:    Neuro:    Anticoagulation: None     Other:      SOCIAL HISTORY:   Occupation:   Marital Status:     FAMILY HISTORY:  No pertinent family history in first degree relatives      REVIEW OF SYSTEMS:  Check here if all are normal other than Neurological []  Negative except as above.     PHYSICAL EXAMINATION:   T(C): 36.8 (06-14-18 @ 16:58), Max: 37 (06-13-18 @ 22:48)  HR: 109 (06-14-18 @ 16:58) (93 - 119)  BP: 117/80 (06-14-18 @ 16:58) (113/82 - 144/83)  RR: 15 (06-14-18 @ 16:58) (15 - 20)  SpO2: 99% (06-14-18 @ 16:58) (96% - 99%)  Wt(kg): --Height (cm): 162.56 (06-14 @ 12:59)  Weight (kg): 68.10576444243822 (06-14 @ 12:59)    Exam:  Awake, Alert, AOX3  PERRL, EOMI, Face equal, Tongue m/l  5/5 throughout, no drift  SILT      LABS:                        10.8   12.7  )-----------( 531      ( 13 Jun 2018 21:44 )             31.9     06-13    128<L>  |  86<L>  |  15  ----------------------------<  154<H>  6.3<HH>   |  22  |  0.46<L>    Ca    9.3      13 Jun 2018 21:44    TPro  8.2  /  Alb  3.8  /  TBili  0.3  /  DBili  x   /  AST  56<H>  /  ALT  18  /  AlkPhos  126<H>  06-13    PT/INR - ( 13 Jun 2018 21:44 )   PT: 11.2 sec;   INR: 1.04 ratio         PTT - ( 13 Jun 2018 21:44 )  PTT:34.4 sec      RADIOLOGY & ADDITIONAL STUDIES:

## 2018-06-14 NOTE — ED ADULT NURSE REASSESSMENT NOTE - NS ED NURSE REASSESS COMMENT FT1
Pt left AMA. MD Vallecillo reviewed AMA paper work with pt. Pt aware of AMA risks, and advised to immediately come back for any new or worsening symptoms, and to f/u with Neurology Dr. Rea tomorrow. Safety and comfort measures maintained. Pt walked with walker out to waiting room. Pt left AMA. MD Vallecillo reviewed AMA paper work with pt. Pt aware of AMA risks, and advised to immediately come back for any new or worsening symptoms, and to f/u with Neurology Dr. Rea tomorrow. Safety and comfort measures maintained.  Pt refused wheel chair for transport out to waiting room. Pt walked with walker out to waiting room. pt ambulated on own multiple times while in ED.

## 2018-06-14 NOTE — ED ADULT NURSE NOTE - OBJECTIVE STATEMENT
62 year old female with pmhx of lumbar disc dx, fibromyalgia, recent excision of epidural massin April presenting to ED c/o potential drainage of surgical site from laminectomy. Went to see Dr. Rea yesterday and was told be admitted to have drainage performed. Was recently seen in ED yesterday for leg swelling that has been improving. At this time denies chest pain, sob, ha, n/v/d, abdominal pain, f/c, urinary symptoms, hematuria. Patient complains of being uncomfortable where sutures were placed on the back. Patient also reports falls in the last week and has facial bruising. Safety and comfort measures maintained. Patient undressed and placed into gown, call bell in hand and side rails up for safety. warm blanket provided, vital signs stable, pt in no acute distress.

## 2018-06-14 NOTE — ED PROVIDER NOTE - OBJECTIVE STATEMENT
62 year old female with pmhx of lumbar disc dx, fibromyalgia, recent excision of epidural mass by NS on 4/7 presents with possible drainage of surgical site from laminectomy. Went to see Dr. Rea's partner yesterday and was told be admitted to have drainage performed. Was recently seen in ED yesterday for leg swelling that has been improving. Had normal blood work and sonogram done. Denies vomiting or nausea. Denies fever or chills. Patient complains of being uncomfortable where sutures were placed on the back. Patient also reports falls in the last week and has facial bruising.

## 2018-06-14 NOTE — ED ADULT NURSE NOTE - CHPI ED SYMPTOMS NEG
no numbness/no motor function loss/no bladder dysfunction/no tingling/no neck tenderness/no anorexia/no bowel dysfunction/no constipation/no difficulty bearing weight/no fatigue

## 2018-06-14 NOTE — ED PROVIDER NOTE - MEDICAL DECISION MAKING DETAILS
62 year old female with recent laminectomy and infection to surgical site. Plan: call neurosurgery and offered pain medications but patient declined at this time.

## 2018-06-14 NOTE — CONSULT NOTE ADULT - ASSESSMENT
62F with recent MRI demonstrating soft tissue abscess at previous lumbar surgical site. She denies systemic symptoms. Recommended admission and wound washout with post-operative IV Abx, however the patient has significant PTSD associated with hospitals and only wants PO antibiotics and to be sent home. She will plan to see Dr. Rea tomorrow, and will have IR tap the collection as an outpatient. She is leaving AMA, and understands all the risks involved including sepsis, osteomyelitis, increasing back pain/weakness and death.

## 2018-06-15 ENCOUNTER — APPOINTMENT (OUTPATIENT)
Dept: NEUROSURGERY | Facility: CLINIC | Age: 63
End: 2018-06-15

## 2018-06-26 ENCOUNTER — APPOINTMENT (OUTPATIENT)
Dept: INTERNAL MEDICINE | Facility: CLINIC | Age: 63
End: 2018-06-26

## 2018-06-26 DIAGNOSIS — M48.07 SPINAL STENOSIS, LUMBOSACRAL REGION: ICD-10-CM

## 2018-06-26 PROBLEM — M89.9 MASS OF SPINE: Status: ACTIVE | Noted: 2018-05-20

## 2018-06-26 PROBLEM — M51.36 DISC DEGENERATION, LUMBAR: Status: ACTIVE | Noted: 2017-05-31

## 2018-06-26 NOTE — HISTORY OF PRESENT ILLNESS
[de-identified] : 63 yo female here for f/u of spinal collection.  Continues with back pain and some drainage from the wound post surgery.  Continues with lower extremity weakenss.  Has yet to get the MRI to f/u on her collection.

## 2018-06-26 NOTE — PHYSICAL EXAM
[de-identified] : negative Jigna's, Neg Cord, ?Baker's cyst on the left [de-identified] : Incision with scant yellow drainage but incision appears to be healing well.

## 2018-07-06 ENCOUNTER — LABORATORY RESULT (OUTPATIENT)
Age: 63
End: 2018-07-06

## 2018-07-06 ENCOUNTER — APPOINTMENT (OUTPATIENT)
Dept: RHEUMATOLOGY | Facility: CLINIC | Age: 63
End: 2018-07-06
Payer: COMMERCIAL

## 2018-07-06 VITALS
OXYGEN SATURATION: 92 % | TEMPERATURE: 97.9 F | BODY MASS INDEX: 30.84 KG/M2 | HEIGHT: 59 IN | DIASTOLIC BLOOD PRESSURE: 84 MMHG | SYSTOLIC BLOOD PRESSURE: 142 MMHG | HEART RATE: 102 BPM | WEIGHT: 153 LBS

## 2018-07-06 DIAGNOSIS — R29.898 OTHER SYMPTOMS AND SIGNS INVOLVING THE MUSCULOSKELETAL SYSTEM: ICD-10-CM

## 2018-07-06 DIAGNOSIS — M46.20 OSTEOMYELITIS OF VERTEBRA, SITE UNSPECIFIED: ICD-10-CM

## 2018-07-06 DIAGNOSIS — M54.41 LUMBAGO WITH SCIATICA, RIGHT SIDE: ICD-10-CM

## 2018-07-06 LAB
BASOPHILS # BLD AUTO: 0.04 K/UL
BASOPHILS NFR BLD AUTO: 0.3 %
EOSINOPHIL # BLD AUTO: 0.39 K/UL
EOSINOPHIL NFR BLD AUTO: 2.5 %
HCT VFR BLD CALC: 37.5 %
HGB BLD-MCNC: 11.8 G/DL
IMM GRANULOCYTES NFR BLD AUTO: 0.4 %
LYMPHOCYTES # BLD AUTO: 2.42 K/UL
LYMPHOCYTES NFR BLD AUTO: 15.6 %
MAN DIFF?: NORMAL
MCHC RBC-ENTMCNC: 28.9 PG
MCHC RBC-ENTMCNC: 31.5 GM/DL
MCV RBC AUTO: 91.9 FL
MONOCYTES # BLD AUTO: 0.87 K/UL
MONOCYTES NFR BLD AUTO: 5.6 %
NEUTROPHILS # BLD AUTO: 11.71 K/UL
NEUTROPHILS NFR BLD AUTO: 75.6 %
PLATELET # BLD AUTO: 619 K/UL
RBC # BLD: 4.08 M/UL
RBC # FLD: 14 %
WBC # FLD AUTO: 15.49 K/UL

## 2018-07-06 PROCEDURE — 99204 OFFICE O/P NEW MOD 45 MIN: CPT

## 2018-07-09 LAB
ALBUMIN SERPL ELPH-MCNC: 4.2 G/DL
ALP BLD-CCNC: 129 U/L
ALT SERPL-CCNC: 18 U/L
ANION GAP SERPL CALC-SCNC: 18 MMOL/L
AST SERPL-CCNC: 15 U/L
BILIRUB SERPL-MCNC: 0.3 MG/DL
BUN SERPL-MCNC: 24 MG/DL
CALCIUM SERPL-MCNC: 10.1 MG/DL
CHLORIDE SERPL-SCNC: 104 MMOL/L
CO2 SERPL-SCNC: 22 MMOL/L
CREAT SERPL-MCNC: 0.9 MG/DL
CRP SERPL-MCNC: 5.4 MG/DL
ERYTHROCYTE [SEDIMENTATION RATE] IN BLOOD BY WESTERGREN METHOD: 59 MM/HR
GLUCOSE SERPL-MCNC: 134 MG/DL
POTASSIUM SERPL-SCNC: 4.1 MMOL/L
PROT SERPL-MCNC: 7.8 G/DL
SODIUM SERPL-SCNC: 144 MMOL/L

## 2018-07-11 ENCOUNTER — FORM ENCOUNTER (OUTPATIENT)
Age: 63
End: 2018-07-11

## 2018-07-11 PROBLEM — M54.41 ACUTE BILATERAL LOW BACK PAIN WITH RIGHT-SIDED SCIATICA: Status: ACTIVE | Noted: 2018-05-29

## 2018-07-11 PROBLEM — R29.898 WEAKNESS OF LEFT LOWER EXTREMITY: Status: ACTIVE | Noted: 2018-06-07

## 2018-07-12 ENCOUNTER — OUTPATIENT (OUTPATIENT)
Dept: OUTPATIENT SERVICES | Facility: HOSPITAL | Age: 63
LOS: 1 days | End: 2018-07-12
Payer: COMMERCIAL

## 2018-07-12 ENCOUNTER — APPOINTMENT (OUTPATIENT)
Dept: CT IMAGING | Facility: HOSPITAL | Age: 63
End: 2018-07-12

## 2018-07-12 DIAGNOSIS — M43.26 FUSION OF SPINE, LUMBAR REGION: ICD-10-CM

## 2018-07-12 PROCEDURE — 77012 CT SCAN FOR NEEDLE BIOPSY: CPT | Mod: 26

## 2018-07-12 PROCEDURE — 85610 PROTHROMBIN TIME: CPT

## 2018-07-12 PROCEDURE — 10022: CPT

## 2018-07-12 PROCEDURE — 77012 CT SCAN FOR NEEDLE BIOPSY: CPT

## 2018-07-12 PROCEDURE — 85730 THROMBOPLASTIN TIME PARTIAL: CPT

## 2018-07-13 ENCOUNTER — RX RENEWAL (OUTPATIENT)
Age: 63
End: 2018-07-13

## 2018-07-18 ENCOUNTER — FORM ENCOUNTER (OUTPATIENT)
Age: 63
End: 2018-07-18

## 2018-07-19 ENCOUNTER — OUTPATIENT (OUTPATIENT)
Dept: OUTPATIENT SERVICES | Facility: HOSPITAL | Age: 63
LOS: 1 days | End: 2018-07-19
Payer: COMMERCIAL

## 2018-07-19 ENCOUNTER — APPOINTMENT (OUTPATIENT)
Dept: MRI IMAGING | Facility: CLINIC | Age: 63
End: 2018-07-19
Payer: COMMERCIAL

## 2018-07-19 DIAGNOSIS — Z00.8 ENCOUNTER FOR OTHER GENERAL EXAMINATION: ICD-10-CM

## 2018-07-19 DIAGNOSIS — M46.20 OSTEOMYELITIS OF VERTEBRA, SITE UNSPECIFIED: ICD-10-CM

## 2018-07-19 PROCEDURE — A9585: CPT

## 2018-07-19 PROCEDURE — 72158 MRI LUMBAR SPINE W/O & W/DYE: CPT | Mod: 26

## 2018-07-19 PROCEDURE — 72158 MRI LUMBAR SPINE W/O & W/DYE: CPT

## 2018-07-26 ENCOUNTER — CLINICAL ADVICE (OUTPATIENT)
Age: 63
End: 2018-07-26

## 2018-07-26 ENCOUNTER — INPATIENT (INPATIENT)
Facility: HOSPITAL | Age: 63
LOS: 0 days | Discharge: LEFT BEFORE DISCHARGE | DRG: 94 | End: 2018-07-27
Attending: HOSPITALIST | Admitting: HOSPITALIST
Payer: COMMERCIAL

## 2018-07-26 ENCOUNTER — RX RENEWAL (OUTPATIENT)
Age: 63
End: 2018-07-26

## 2018-07-26 VITALS
RESPIRATION RATE: 17 BRPM | SYSTOLIC BLOOD PRESSURE: 102 MMHG | DIASTOLIC BLOOD PRESSURE: 59 MMHG | WEIGHT: 151.9 LBS | OXYGEN SATURATION: 97 % | HEART RATE: 115 BPM | HEIGHT: 63 IN | TEMPERATURE: 98 F

## 2018-07-26 DIAGNOSIS — R29.6 REPEATED FALLS: ICD-10-CM

## 2018-07-26 DIAGNOSIS — Z63.4 DISAPPEARANCE AND DEATH OF FAMILY MEMBER: ICD-10-CM

## 2018-07-26 DIAGNOSIS — G06.2 EXTRADURAL AND SUBDURAL ABSCESS, UNSPECIFIED: ICD-10-CM

## 2018-07-26 DIAGNOSIS — Z29.9 ENCOUNTER FOR PROPHYLACTIC MEASURES, UNSPECIFIED: ICD-10-CM

## 2018-07-26 DIAGNOSIS — M50.20 OTHER CERVICAL DISC DISPLACEMENT, UNSPECIFIED CERVICAL REGION: ICD-10-CM

## 2018-07-26 LAB
ANION GAP SERPL CALC-SCNC: 12 MMOL/L — SIGNIFICANT CHANGE UP (ref 5–17)
APTT BLD: 50.7 SEC — HIGH (ref 27.5–37.4)
BASOPHILS # BLD AUTO: 0 K/UL — SIGNIFICANT CHANGE UP (ref 0–0.2)
BASOPHILS NFR BLD AUTO: 0.5 % — SIGNIFICANT CHANGE UP (ref 0–2)
BUN SERPL-MCNC: 14 MG/DL — SIGNIFICANT CHANGE UP (ref 7–23)
CALCIUM SERPL-MCNC: 9.3 MG/DL — SIGNIFICANT CHANGE UP (ref 8.4–10.5)
CHLORIDE SERPL-SCNC: 103 MMOL/L — SIGNIFICANT CHANGE UP (ref 96–108)
CO2 SERPL-SCNC: 26 MMOL/L — SIGNIFICANT CHANGE UP (ref 22–31)
CREAT SERPL-MCNC: 0.58 MG/DL — SIGNIFICANT CHANGE UP (ref 0.5–1.3)
EOSINOPHIL # BLD AUTO: 0.2 K/UL — SIGNIFICANT CHANGE UP (ref 0–0.5)
EOSINOPHIL NFR BLD AUTO: 2.6 % — SIGNIFICANT CHANGE UP (ref 0–6)
GLUCOSE SERPL-MCNC: 164 MG/DL — HIGH (ref 70–99)
HCT VFR BLD CALC: 33.4 % — LOW (ref 34.5–45)
HGB BLD-MCNC: 11.2 G/DL — LOW (ref 11.5–15.5)
INR BLD: 1.09 RATIO — SIGNIFICANT CHANGE UP (ref 0.88–1.16)
LYMPHOCYTES # BLD AUTO: 1.6 K/UL — SIGNIFICANT CHANGE UP (ref 1–3.3)
LYMPHOCYTES # BLD AUTO: 16.9 % — SIGNIFICANT CHANGE UP (ref 13–44)
MCHC RBC-ENTMCNC: 29.4 PG — SIGNIFICANT CHANGE UP (ref 27–34)
MCHC RBC-ENTMCNC: 33.4 GM/DL — SIGNIFICANT CHANGE UP (ref 32–36)
MCV RBC AUTO: 88.1 FL — SIGNIFICANT CHANGE UP (ref 80–100)
MONOCYTES # BLD AUTO: 0.7 K/UL — SIGNIFICANT CHANGE UP (ref 0–0.9)
MONOCYTES NFR BLD AUTO: 7.2 % — SIGNIFICANT CHANGE UP (ref 2–14)
NEUTROPHILS # BLD AUTO: 7 K/UL — SIGNIFICANT CHANGE UP (ref 1.8–7.4)
NEUTROPHILS NFR BLD AUTO: 72.8 % — SIGNIFICANT CHANGE UP (ref 43–77)
PLATELET # BLD AUTO: 569 K/UL — HIGH (ref 150–400)
POTASSIUM SERPL-MCNC: 4.2 MMOL/L — SIGNIFICANT CHANGE UP (ref 3.5–5.3)
POTASSIUM SERPL-SCNC: 4.2 MMOL/L — SIGNIFICANT CHANGE UP (ref 3.5–5.3)
PROTHROM AB SERPL-ACNC: 11.9 SEC — SIGNIFICANT CHANGE UP (ref 9.8–12.7)
RBC # BLD: 3.8 M/UL — SIGNIFICANT CHANGE UP (ref 3.8–5.2)
RBC # FLD: 13.9 % — SIGNIFICANT CHANGE UP (ref 10.3–14.5)
SODIUM SERPL-SCNC: 141 MMOL/L — SIGNIFICANT CHANGE UP (ref 135–145)
WBC # BLD: 9.6 K/UL — SIGNIFICANT CHANGE UP (ref 3.8–10.5)
WBC # FLD AUTO: 9.6 K/UL — SIGNIFICANT CHANGE UP (ref 3.8–10.5)

## 2018-07-26 PROCEDURE — 72131 CT LUMBAR SPINE W/O DYE: CPT | Mod: 26

## 2018-07-26 PROCEDURE — 99255 IP/OBS CONSLTJ NEW/EST HI 80: CPT

## 2018-07-26 PROCEDURE — 99285 EMERGENCY DEPT VISIT HI MDM: CPT

## 2018-07-26 PROCEDURE — 99223 1ST HOSP IP/OBS HIGH 75: CPT | Mod: GC

## 2018-07-26 RX ORDER — GABAPENTIN 400 MG/1
200 CAPSULE ORAL
Qty: 0 | Refills: 0 | Status: DISCONTINUED | OUTPATIENT
Start: 2018-07-26 | End: 2018-07-27

## 2018-07-26 RX ORDER — OXYCODONE AND ACETAMINOPHEN 5; 325 MG/1; MG/1
1 TABLET ORAL EVERY 6 HOURS
Qty: 0 | Refills: 0 | Status: DISCONTINUED | OUTPATIENT
Start: 2018-07-26 | End: 2018-07-27

## 2018-07-26 RX ORDER — OXYCODONE AND ACETAMINOPHEN 5; 325 MG/1; MG/1
2 TABLET ORAL EVERY 6 HOURS
Qty: 0 | Refills: 0 | Status: DISCONTINUED | OUTPATIENT
Start: 2018-07-26 | End: 2018-07-27

## 2018-07-26 RX ORDER — ACETAMINOPHEN 500 MG
650 TABLET ORAL EVERY 6 HOURS
Qty: 0 | Refills: 0 | Status: DISCONTINUED | OUTPATIENT
Start: 2018-07-26 | End: 2018-07-27

## 2018-07-26 RX ADMIN — OXYCODONE AND ACETAMINOPHEN 2 TABLET(S): 5; 325 TABLET ORAL at 23:45

## 2018-07-26 RX ADMIN — OXYCODONE AND ACETAMINOPHEN 2 TABLET(S): 5; 325 TABLET ORAL at 23:11

## 2018-07-26 RX ADMIN — GABAPENTIN 200 MILLIGRAM(S): 400 CAPSULE ORAL at 21:07

## 2018-07-26 SDOH — SOCIAL STABILITY - SOCIAL INSECURITY: DISSAPEARANCE AND DEATH OF FAMILY MEMBER: Z63.4

## 2018-07-26 NOTE — H&P ADULT - HISTORY OF PRESENT ILLNESS
62F pmh DVT (resolved, no longer on anticoagulation), fibromyalgia, L3-L5 ?discitis/osteomyelitis with subarachnoiditis and mass vs phlegmon s/p resection of epidural mass 4/17/18, now with +MRI new osteo L3-L5.  On 6/13 had MRI which showed new posterior spinal abscess 3.2 x 2.6 x 7.2 cm, Anterior epidural collection at L3-L5 levels, with abnormal nerve root and epidural enhancement from L3-S1 levels, likely represents a complex epidural abscess with associated arachnoiditis of the cauda equina. At that time she signed out AMA and was given 10 days of PO antibiotics she went to an IR aspiration 7/12. Procedure was unsuccessful as previously seen fluid collection was markedly smaller and appeared as dense soft tissue with no aspirate obtained (findings likely repreesenting granulation tissue). On 7/19 repeat MRI showed "redemonstration of anterior epidural collection from L3 through L5, likely abscess, not significantly changed. Diffuse epidural enhancement and arachnoiditis. Facetitis on the left at L4-L5 and L5-S1 and redemonstration of enhancement of the L3-L4 and L4-L5 endplates, which may represent discitis/osteomyelitis." Interval resolution of posterior subcutaneous soft tissue collection.    Today, pt is asymptomatic, denying fevers, chills, cough, chest pain, n/v/d/c, anesthesia, loss of bowel control, weakness in legs. She is reporting soreness from 2 mechanical falls she had on Friday and Mon prior to admission (both she landed on her buttocks, did not hit her head, no LOC, AMS, or weakness before or after falls).

## 2018-07-26 NOTE — H&P ADULT - NSHPPHYSICALEXAM_GEN_ALL_CORE
T(C): 37.1 (07-26-18 @ 18:20), Max: 37.1 (07-26-18 @ 18:20)  HR: 90 (07-26-18 @ 18:20) (89 - 115)  BP: 118/71 (07-26-18 @ 18:20) (102/59 - 118/71)  RR: 19 (07-26-18 @ 18:20) (17 - 19)  SpO2: 98% (07-26-18 @ 18:20) (95% - 98%)    GENERAL: NAD, well-developed  HEAD:  Atraumatic, Normocephalic  EYES: EOMI, PERRLA, conjunctiva and sclera clear  NECK: Supple, No JVD  CHEST/LUNG: Clear to auscultation bilaterally; No wheeze  HEART: Regular rate and rhythm; No murmurs, rubs, or gallops  BACK: POINT TENDERNESS over mid and lower thoracic spine  ABDOMEN: Soft, Nontender, Nondistended; Bowel sounds present  EXTREMITIES:  2+ Peripheral Pulses, No clubbing, cyanosis, or edema  PSYCH: AAOx3  NEUROLOGY: non-focal  SKIN: No rashes or lesions

## 2018-07-26 NOTE — H&P ADULT - NSHPLABSRESULTS_GEN_ALL_CORE
11.2   9.6   )-----------( 569      ( 26 Jul 2018 12:54 )             33.4       07-26    141  |  103  |  14  ----------------------------<  164<H>  4.2   |  26  |  0.58    Ca    9.3      26 Jul 2018 12:54         PT/INR - ( 26 Jul 2018 12:54 )   PT: 11.9 sec;   INR: 1.09 ratio       PTT - ( 26 Jul 2018 12:54 )  PTT:50.7 sec    < from: CT Lumbar Spine No Cont (07.26.18 @ 15:19) >    IMPRESSION: Redemonstration of laminectomy changes from L3-L5. The   previously noted noted enhancing anterior epidural collection spanning   these levels is not well evaluated on noncontrast CT modality. Previous   noted possible discitis/osteomyelitis at the L4-L5 and L5-S1 levels is   not well evaluated on CT modality.    Age indeterminate L2 spinousprocess fracture.    < end of copied text >

## 2018-07-26 NOTE — ED PROVIDER NOTE - MEDICAL DECISION MAKING DETAILS
Patient sent out of concern for recurrence of spinal infection. Appears well. NS paged. Awaiting reply.

## 2018-07-26 NOTE — ED PROVIDER NOTE - SKIN, MLM
Skin normal color for race, warm, dry and intact. No evidence of rash. Nodularity at inferior aspect of incision site. No erythema, edema, warmth, ecchymosis or drainage.

## 2018-07-26 NOTE — CONSULT NOTE ADULT - SUBJECTIVE AND OBJECTIVE BOX
p ([id number])     HPI: 62F s/p L3-L5 lami (4/17) for epidural abscess with Dr. Rea presents to the ED today after being told to for new MRI showing new possible osteomyelitis of L3-L5. Patient has had continued pain since surgery but has had no acute worsening of symptoms. Still able to ambulate at home and use the stairs.     Denies saddle anesthesia, urinary/ fecal incontinence, new weakness or numbness.    Imaging: MRI (7/19)  "Redemonstration of anterior epidural collection from L3 through L5, likely abscess, not significantly changed. Diffuse epidural enhancement and arachnoiditis. Facetitis on the left at L4-L5 and L5-S1 and redemonstration of enhancement of the L3-L4 and L4-L5 endplates, which may represent discitis/osteomyelitis. Interval resolution of posterior subcutaneous soft tissue collection."    CT L-spine 7/26 - no obvious fracture or deformity    Exam:  Constitutional: No Acute Distress     Neurological: AOx3, Following Commands    Motor exam:  All weakness is pain limited          Upper extremity                   Delt     Bicep     Tricep    HG                                                 R         5/5        5/5        5/5       5/5                                               L          5/5        5/5        5/5       5/5          Lower extremity                   HF         KF          KE         DF          PF                                                  R        4/5        5/5        5/5       5/5         5/5                                               L         5/5        5/5       5/5       5/5          5/5                                                 Sensation: [x] intact to light touch  [] decreased:     No clonus    Incision looks well healed without sign of infection or drainage.  =====================  PAST MEDICAL HISTORY   Herniated disc, cervical  Spinal stenosis  Fibromyalgia    PAST SURGICAL HISTORY   No significant past surgical history        MEDICATIONS:  Antibiotics:    Neuro:    Anticoagulation:    Other:      SOCIAL HISTORY:   Occupation:   Marital Status:     FAMILY HISTORY:  No pertinent family history in first degree relatives      ROS: Negative except per HPI    LABS:  PT/INR - ( 26 Jul 2018 12:54 )   PT: 11.9 sec;   INR: 1.09 ratio         PTT - ( 26 Jul 2018 12:54 )  PTT:50.7 sec                        11.2   9.6   )-----------( 569      ( 26 Jul 2018 12:54 )             33.4     07-26    141  |  103  |  14  ----------------------------<  164<H>  4.2   |  26  |  0.58    Ca    9.3      26 Jul 2018 12:54

## 2018-07-26 NOTE — H&P ADULT - PROBLEM SELECTOR PLAN 1
pt w/ new abscess on MRI. Nuerosurg decided that they would not intervene. ID wants biopsy to confirm diagnosis. Osteo (tubercular, fungal, or bacterial) vs inflammatory.  -IR called and consulted, please call in am to confirm when biopsy can be performed

## 2018-07-26 NOTE — H&P ADULT - NSHPREVIEWOFSYSTEMS_GEN_ALL_CORE
CONSTITUTIONAL: No weakness, fevers or chills  EYES/ENT: No visual changes;  No vertigo or throat pain   NECK: No pain or stiffness  RESPIRATORY: No cough, wheezing, hemoptysis; No shortness of breath  CARDIOVASCULAR: No chest pain or palpitations  GASTROINTESTINAL: No abdominal or epigastric pain. No n/v/c/d, melena, or hematochezia.  GENITOURINARY: No dysuria, frequency or hematuria  NEUROLOGICAL:  No headache, dizziness, syncope.  MUSCULOSKELETAL:  pt with muscular soreness over both buttocks and down L side and leg  HEMATOLOGIC:  No anemia, bleeding or bruising.  LYMPHATICS:  No enlarged nodes. No history of splenectomy.  PSYCHIATRIC:  No history of depression or anxiety.  ENDOCRINOLOGIC:  No reports of sweating, cold or heat intolerance. No polyuria or polydipsia.  ALLERGIES:  No history of asthma, hives, eczema or rhinitis.

## 2018-07-26 NOTE — CONSULT NOTE ADULT - SUBJECTIVE AND OBJECTIVE BOX
HPI: 62 f with h/o DVT, fibromyalgia, personality, L3-L5 ?discitis/osteomyelitis with subarachnoiditis and mass vs phlegmon s/p resection of epidural mass 4/17/18, path with necrotizing granuloma and culture and PCR negative, and there was a concern for plasmacytoma and non infectious etiology. Then 6/13 had another MRI which showed new posterior spinal abscess 3.2 x 2.6 x 7.2 cm, Anterior epidural collection at L3-L5 levels, with abnormal nerve root and epidural enhancement from L3-S1 levels, likely represents a complex epidural abscess with associated arachnoiditis of the cauda equina. At that time she signed out AMA and was given 10 days of PO antibiotics she went to an IR aspiration 7/12 but it was dry as the Previously seen fluid collection was markedly smaller and appeared as dense soft tissue with no aspirate obtained. Findings likely represent granulation tissue. But on 7/19 repeat MRI showed Redemonstration of anterior epidural collection from L3 through L5, likely abscess, not significantly changed. Diffuse epidural enhancement and arachnoiditis. Facetitis on the left at L4-L5 and L5-S1 and redemonstration of enhancement of the L3-L4 and L4-L5 endplates, which may represent discitis/osteomyelitis. Interval resolution of posterior subcutaneous soft tissue collection.   So she was called to go the ER but she did not want to go to ER and ultimately came today, she has no back pain just complaining of pain in the hip and gluteal area, no fever or chills.              PAST MEDICAL & SURGICAL HISTORY:  Herniated disc, cervical  Spinal stenosis  Fibromyalgia  No significant past surgical history      Allergies    No Known Allergies    Intolerances        ANTIMICROBIALS:      OTHER MEDS:      SOCIAL HISTORY:  pt is [place] but born in [place], , lives with her , current smoker, denied drug abuse     FAMILY HISTORY:  reviewed, No pertinent family history in first degree relatives      ROS:    All other systems negative     Constitutional: no fever, no chills, no weight loss, no night sweats  Eye: no eye pain, no redness, no vision changes  ENT:  no sore throat, no rhinorrhea  Cardiovascular:  no chest pain, no palpitation  Respiratory:  no SOB, no cough  GI:  no abd pain, no vomiting, no diarrhea  urinary: no dysuria, no hematuria, no flank pain  : no  discharge or bleeding  musculoskeletal: pain in the buttock area  skin:  no rash  neurology:  no headache, no seizure, no change in mental status  psych: no anxiety, no depression     Physical Exam:    General:    NAD, non toxic  Head: atraumatic, normocephalic  Eyes: normal sclera and conjunctiva  ENT:   no oropharyngeal lesions, no LAD, neck supple  Cardio:    regular S1,S2, no murmur  Respiratory:   clear b/l, no wheezing  abd:   soft, BS +, not tender, no hepatosplenomegaly  :     no CVAT, no suprapubic tenderness, no ryan  Musculoskeletal : spine surgical scar well healed, no fluctuation  Skin:    no rash  vascular: no lines, normal pulses  Neurologic:     no focal deficits  psych: normal affect, no suicidal ideation      Drug Dosing Weight  Height (cm): 160.02 (26 Jul 2018 12:07)  Weight (kg): 68.9 (26 Jul 2018 12:07)  BMI (kg/m2): 26.9 (26 Jul 2018 12:07)  BSA (m2): 1.72 (26 Jul 2018 12:07)    Vital Signs Last 24 Hrs  T(F): 98.1 (07-26-18 @ 12:30), Max: 98.1 (07-26-18 @ 12:30)    Vital Signs Last 24 Hrs  HR: 109 (07-26-18 @ 12:30) (109 - 115)  BP: 114/70 (07-26-18 @ 12:30) (102/59 - 114/70)  RR: 18 (07-26-18 @ 12:30)  SpO2: 96% (07-26-18 @ 12:30) (96% - 97%)  Wt(kg): --                          11.2   9.6   )-----------( 569      ( 26 Jul 2018 12:54 )             33.4       07-26    141  |  103  |  14  ----------------------------<  164<H>  4.2   |  26  |  0.58    Ca    9.3      26 Jul 2018 12:54            MICROBIOLOGY:  Culture - Surgical Swab (04.17.18 @ 17:06)    Specimen Source: .Surgical Swab L4 epidural collection #2    Culture Results:   No growth at 5 days                  RADIOLOGY:    Images below reviewed personally    MRI pending

## 2018-07-26 NOTE — H&P ADULT - ASSESSMENT
62F pmh DVT (resolved, no longer on anticoagulation), fibromyalgia, L3-L5 ?discitis/osteomyelitis with subarachnoiditis and mass vs phlegmon s/p resection of epidural mass 4/17/18, now with +MRI new osteo L3-L5.

## 2018-07-26 NOTE — CONSULT NOTE ADULT - ASSESSMENT
62F s/p L3-L5 lami (4/17) for epidural abscess with Dr. Rea presents to the ED today after being told to for new MRI showing new possible osteomyelitis of L3-L5  - No acute neurosurgical intervention at this time  - pain control  - ID consult for possible osteo  - F/u outpatient with Dr. Rea in 1-2 weeks or sooner if symptoms worsen

## 2018-07-26 NOTE — ED PROVIDER NOTE - OBJECTIVE STATEMENT
63 y/o F  with pmhx of fibromyalgia, herniated disc, spinal stenosis and pshx of spinal surgery presents s/p sent in by rheumatologist to follow up on spinal pain and rule out spinal infection. Pt had surgery performed by Dr. Rea for spine infection April 17th.  Pt has had pain and difficulty walking, suspicion that infection might be back. No drainage of spinal fluid currently. CT scan with contrast done two weeks ago showed no remarkable findings. MRI done last Thursday showed evidence of infection. Denies fever, chills, fever, sob, cp, n/v/d, etc. Pt is a smoker.   Dr. Marcela Morgan: Rheumatologist   Dr. Wang: GP

## 2018-07-26 NOTE — H&P ADULT - PROBLEM SELECTOR PLAN 2
Pt takes Vicodin at home for chronic pain. Dose checked on iSTOP Reference #: [id number].   -c/w percoset (1 for moderate pain, 2 for severe)

## 2018-07-26 NOTE — H&P ADULT - PROBLEM SELECTOR PLAN 4
Pt has lost both daughters (one to a murder and other to medical complication). She is intermittently tearful, but not expressing SI/HI. pt does not wish to speak to a psychiatrist at this time.   -pt wishes to go outside daily to clear her head

## 2018-07-26 NOTE — CONSULT NOTE ADULT - ASSESSMENT
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 Then 6/13 had another MRI which showed new posterior spinal abscess 3.2 x 2.6 x 7.2 cm, Anterior epidural collection at L3-L5 levels, with abnormal nerve root and epidural enhancement from L3-S1 levels, likely represents a complex epidural abscess with associated arachnoiditis of the cauda equina. At that time she signed out AMA and was given 10 days of PO antibiotics she went to an IR aspiration 7/12 but it was dry as the Previously seen fluid collection was markedly smaller and appeared as dense soft tissue with no aspirate obtained. Findings likely represent granulation tissue. But on 7/19 repeat MRI showed Redemonstration of anterior epidural collection from L3 through L5, likely abscess, not significantly changed. Diffuse epidural enhancement and arachnoiditis. Facetitis on the left at L4-L5 and L5-S1 and redemonstration of enhancement of the L3-L4 and L4-L5 endplates, which may represent discitis/osteomyelitis. Interval resolution of posterior subcutaneous soft tissue collection.   afebrile, normal WBC, MRI pending    L3-L5 discitis and osteomyelitis with epidural abscess, subarachnoiditis, unknown etiology, infectious vs inflammatory    * check ESR and CRP  * blood cx x 2  * f/u MRI  * f/u with neurosurgery  * monitor off antibiotics  * will need a tissue for path, bacterial, fungal and AFB culture 62 f with h/o DVT, fibromyalgia, personality, L3-L5 ?discitis/osteomyelitis with subarachnoiditis and mass vs phlegmon s/p resection of epidural mass 4/17/18, path with necrotizing granuloma and culture and PCR negative, and there was a concern for plasmacytoma and non infectious etiology. Then 6/13 had another MRI which showed new posterior spinal abscess 3.2 x 2.6 x 7.2 cm, Anterior epidural collection at L3-L5 levels, with abnormal nerve root and epidural enhancement from L3-S1 levels, likely represents a complex epidural abscess with associated arachnoiditis of the cauda equina. At that time she signed out AMA and was given 10 days of PO antibiotics she went to an IR aspiration 7/12 but it was dry as the Previously seen fluid collection was markedly smaller and appeared as dense soft tissue with no aspirate obtained. Findings likely represent granulation tissue. But on 7/19 repeat MRI showed Redemonstration of anterior epidural collection from L3 through L5, likely abscess, not significantly changed. Diffuse epidural enhancement and arachnoiditis. Facetitis on the left at L4-L5 and L5-S1 and redemonstration of enhancement of the L3-L4 and L4-L5 endplates, which may represent discitis/osteomyelitis. Interval resolution of posterior subcutaneous soft tissue collection.   afebrile, normal WBC, MRI pending    L3-L5 discitis and osteomyelitis with epidural collection, subarachnoiditis, unknown etiology, infectious, inflammatory or malignancy  the whole process does not behave like a typical bacterial pathogen, previous cultures and PCR all negative    * check ESR and CRP  * blood cx x 2  * f/u MRI  * f/u with neurosurgery and IR for drainage of epidural collection  * monitor off antibiotics  * will need a tissue for path, bacterial, fungal and AFB culture

## 2018-07-26 NOTE — ED PROVIDER NOTE - PROGRESS NOTE DETAILS
Call placed to Dr. Rea from [facility]: [phone]. Phone just rings. No answer. Call placed to MIKO Morgan from [facility]: [phone]. Not at that location. Provided [phone]. Spoke with MIKO Morgan. Call placed to Dr. Wang from : [phone]. Spoke with Dr. Wang. If admitted to  to be admitted to [facility]ist. MRI from 7/19/18 IMPRESSION:   Redemonstration of anterior epidural collection from L3 through L5,   likely abscess, not significantly changed. Diffuse epidural enhancement   and arachnoiditis.   Facetitis on the left at L4-L5 and L5-S1 and redemonstration of   enhancement of the L3-L4 and L4-L5 endplates, which may represent   discitis/osteomyelitis.   Interval resolution of posterior subcutaneous soft tissue collection. Spoke with [facility]. Non-con CT of L spine ordered.

## 2018-07-26 NOTE — H&P ADULT - PROBLEM SELECTOR PLAN 3
pt with multiple mechanical falls at home. Claims that 1st she tripped on a ball and 2nd she misjudged a distance.   -PT consult

## 2018-07-26 NOTE — ED ADULT NURSE NOTE - OBJECTIVE STATEMENT
63 y/o Female sent in by rheumatologist to follow up on spinal pain and rule out spinal infection. Pt had surgery  for spine infection on April 17th.  Pt has had pain and difficulty walking.  No drainage of spinal fluid currently.  Denies fever, chills, fever, n/v/d, chest pain, SOB.  No acuter respiratory distress noted. 63 y/o Female sent in by rheumatologist to follow up on spinal pain and rule out spinal infection. Pt had surgery  for spine infection on April 17th.  Pt has had pain and difficulty walking.  No drainage of spinal fluid currently.  Denies fever, chills, fever, n/v/d, chest pain, SOB.  No acute respiratory distress noted.

## 2018-07-27 ENCOUNTER — TRANSCRIPTION ENCOUNTER (OUTPATIENT)
Age: 63
End: 2018-07-27

## 2018-07-27 ENCOUNTER — INBOUND DOCUMENT (OUTPATIENT)
Age: 63
End: 2018-07-27

## 2018-07-27 VITALS
SYSTOLIC BLOOD PRESSURE: 152 MMHG | OXYGEN SATURATION: 96 % | TEMPERATURE: 99 F | RESPIRATION RATE: 18 BRPM | DIASTOLIC BLOOD PRESSURE: 77 MMHG | HEART RATE: 94 BPM

## 2018-07-27 LAB
ANION GAP SERPL CALC-SCNC: 12 MMOL/L — SIGNIFICANT CHANGE UP (ref 5–17)
APTT BLD: 49.2 SEC — HIGH (ref 27.5–37.4)
BASOPHILS # BLD AUTO: 0.1 K/UL — SIGNIFICANT CHANGE UP (ref 0–0.2)
BASOPHILS NFR BLD AUTO: 0.6 % — SIGNIFICANT CHANGE UP (ref 0–2)
BUN SERPL-MCNC: 15 MG/DL — SIGNIFICANT CHANGE UP (ref 7–23)
CALCIUM SERPL-MCNC: 9.6 MG/DL — SIGNIFICANT CHANGE UP (ref 8.4–10.5)
CHLORIDE SERPL-SCNC: 102 MMOL/L — SIGNIFICANT CHANGE UP (ref 96–108)
CO2 SERPL-SCNC: 24 MMOL/L — SIGNIFICANT CHANGE UP (ref 22–31)
CREAT SERPL-MCNC: 0.65 MG/DL — SIGNIFICANT CHANGE UP (ref 0.5–1.3)
CRP SERPL-MCNC: 3.98 MG/DL — HIGH (ref 0–0.4)
EOSINOPHIL # BLD AUTO: 0.4 K/UL — SIGNIFICANT CHANGE UP (ref 0–0.5)
EOSINOPHIL NFR BLD AUTO: 2.8 % — SIGNIFICANT CHANGE UP (ref 0–6)
GLUCOSE SERPL-MCNC: 138 MG/DL — HIGH (ref 70–99)
HCT VFR BLD CALC: 32.8 % — LOW (ref 34.5–45)
HGB BLD-MCNC: 10.5 G/DL — LOW (ref 11.5–15.5)
INR BLD: 0.96 RATIO — SIGNIFICANT CHANGE UP (ref 0.88–1.16)
LYMPHOCYTES # BLD AUTO: 16.8 % — SIGNIFICANT CHANGE UP (ref 13–44)
LYMPHOCYTES # BLD AUTO: 2.4 K/UL — SIGNIFICANT CHANGE UP (ref 1–3.3)
MCHC RBC-ENTMCNC: 28.2 PG — SIGNIFICANT CHANGE UP (ref 27–34)
MCHC RBC-ENTMCNC: 31.9 GM/DL — LOW (ref 32–36)
MCV RBC AUTO: 88.2 FL — SIGNIFICANT CHANGE UP (ref 80–100)
MONOCYTES # BLD AUTO: 1.1 K/UL — HIGH (ref 0–0.9)
MONOCYTES NFR BLD AUTO: 7.6 % — SIGNIFICANT CHANGE UP (ref 2–14)
NEUTROPHILS # BLD AUTO: 10.3 K/UL — HIGH (ref 1.8–7.4)
NEUTROPHILS NFR BLD AUTO: 72.2 % — SIGNIFICANT CHANGE UP (ref 43–77)
PLATELET # BLD AUTO: 580 K/UL — HIGH (ref 150–400)
POTASSIUM SERPL-MCNC: 4.5 MMOL/L — SIGNIFICANT CHANGE UP (ref 3.5–5.3)
POTASSIUM SERPL-SCNC: 4.5 MMOL/L — SIGNIFICANT CHANGE UP (ref 3.5–5.3)
PROTHROM AB SERPL-ACNC: 10.5 SEC — SIGNIFICANT CHANGE UP (ref 9.8–12.7)
RBC # BLD: 3.72 M/UL — LOW (ref 3.8–5.2)
RBC # FLD: 13.9 % — SIGNIFICANT CHANGE UP (ref 10.3–14.5)
SODIUM SERPL-SCNC: 138 MMOL/L — SIGNIFICANT CHANGE UP (ref 135–145)
WBC # BLD: 14.3 K/UL — HIGH (ref 3.8–10.5)
WBC # FLD AUTO: 14.3 K/UL — HIGH (ref 3.8–10.5)

## 2018-07-27 PROCEDURE — 85730 THROMBOPLASTIN TIME PARTIAL: CPT

## 2018-07-27 PROCEDURE — 72220 X-RAY EXAM SACRUM TAILBONE: CPT

## 2018-07-27 PROCEDURE — 73610 X-RAY EXAM OF ANKLE: CPT | Mod: 26,LT

## 2018-07-27 PROCEDURE — 73562 X-RAY EXAM OF KNEE 3: CPT | Mod: 26,LT

## 2018-07-27 PROCEDURE — 99285 EMERGENCY DEPT VISIT HI MDM: CPT

## 2018-07-27 PROCEDURE — 73110 X-RAY EXAM OF WRIST: CPT | Mod: 26,RT

## 2018-07-27 PROCEDURE — 80307 DRUG TEST PRSMV CHEM ANLYZR: CPT

## 2018-07-27 PROCEDURE — 85610 PROTHROMBIN TIME: CPT

## 2018-07-27 PROCEDURE — 72220 X-RAY EXAM SACRUM TAILBONE: CPT | Mod: 26

## 2018-07-27 PROCEDURE — 99233 SBSQ HOSP IP/OBS HIGH 50: CPT

## 2018-07-27 PROCEDURE — 72131 CT LUMBAR SPINE W/O DYE: CPT

## 2018-07-27 PROCEDURE — 73110 X-RAY EXAM OF WRIST: CPT

## 2018-07-27 PROCEDURE — 80048 BASIC METABOLIC PNL TOTAL CA: CPT

## 2018-07-27 PROCEDURE — 87040 BLOOD CULTURE FOR BACTERIA: CPT

## 2018-07-27 PROCEDURE — 85027 COMPLETE CBC AUTOMATED: CPT

## 2018-07-27 PROCEDURE — 85652 RBC SED RATE AUTOMATED: CPT

## 2018-07-27 PROCEDURE — 86140 C-REACTIVE PROTEIN: CPT

## 2018-07-27 PROCEDURE — 99233 SBSQ HOSP IP/OBS HIGH 50: CPT | Mod: GC

## 2018-07-27 PROCEDURE — 73610 X-RAY EXAM OF ANKLE: CPT

## 2018-07-27 PROCEDURE — 73562 X-RAY EXAM OF KNEE 3: CPT

## 2018-07-27 RX ADMIN — GABAPENTIN 200 MILLIGRAM(S): 400 CAPSULE ORAL at 05:27

## 2018-07-27 RX ADMIN — OXYCODONE AND ACETAMINOPHEN 2 TABLET(S): 5; 325 TABLET ORAL at 12:25

## 2018-07-27 RX ADMIN — OXYCODONE AND ACETAMINOPHEN 2 TABLET(S): 5; 325 TABLET ORAL at 12:09

## 2018-07-27 RX ADMIN — OXYCODONE AND ACETAMINOPHEN 2 TABLET(S): 5; 325 TABLET ORAL at 05:26

## 2018-07-27 RX ADMIN — OXYCODONE AND ACETAMINOPHEN 2 TABLET(S): 5; 325 TABLET ORAL at 06:00

## 2018-07-27 NOTE — PROGRESS NOTE ADULT - ASSESSMENT
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 Then 6/13 had another MRI which showed new posterior spinal abscess 3.2 x 2.6 x 7.2 cm, Anterior epidural collection at L3-L5 levels, with abnormal nerve root and epidural enhancement from L3-S1 levels, likely represents a complex epidural abscess with associated arachnoiditis of the cauda equina. At that time she signed out AMA and was given 10 days of PO antibiotics she went to an IR aspiration 7/12 but it was dry as the Previously seen fluid collection was markedly smaller and appeared as dense soft tissue with no aspirate obtained. Findings likely represent granulation tissue. But on 7/19 repeat MRI showed Redemonstration of anterior epidural collection from L3 through L5, likely abscess, not significantly changed. Diffuse epidural enhancement and arachnoiditis. Facetitis on the left at L4-L5 and L5-S1 and redemonstration of enhancement of the L3-L4 and L4-L5 endplates, which may represent discitis/osteomyelitis. Interval resolution of posterior subcutaneous soft tissue collection.   afebrile, normal WBC, CT without contrast was not significant     L3-L5 discitis and osteomyelitis with epidural collection, subarachnoiditis, unknown etiology, infectious, inflammatory or malignancy  the whole process does not behave like a typical bacterial pathogen, previous cultures and PCR all negative  ESR: 92, CRP: 3.98      * blood cx x 2  * f/u MRI  * f/u with  IR for drainage of epidural collection or bone BX  * monitor off antibiotics  * will need a tissue for path, bacterial, fungal and AFB culture
62F pmh DVT (resolved, no longer on anticoagulation), fibromyalgia, L3-L5 ?discitis/osteomyelitis with subarachnoiditis and mass vs phlegmon s/p resection of epidural mass 4/17/18, now with +MRI new osteo L3-L5. Course complicated by a fall.

## 2018-07-27 NOTE — PROVIDER CONTACT NOTE (OTHER) - ASSESSMENT
Pt alert, Did not sleep all night constantly up using restroom and restless.  VSS, In no apparent distress

## 2018-07-27 NOTE — PROGRESS NOTE ADULT - SUBJECTIVE AND OBJECTIVE BOX
Patient is a 62y old  Female who presents with a chief complaint of spinal abscess, unclear etiology (26 Jul 2018 18:28)   SOB    INTERVAL HPI/OVERNIGHT EVENTS: no acute event overnight. Witnessed fall at 11 am today. No LOC. Endorses to using cocaine last night.    MEDICATIONS  (STANDING):  gabapentin 200 milliGRAM(s) Oral two times a day    MEDICATIONS  (PRN):  acetaminophen   Tablet. 650 milliGRAM(s) Oral every 6 hours PRN Mild Pain (1 - 3)  oxyCODONE    5 mG/acetaminophen 325 mG 1 Tablet(s) Oral every 6 hours PRN Moderate Pain (4 - 6)  oxyCODONE    5 mG/acetaminophen 325 mG 2 Tablet(s) Oral every 6 hours PRN Severe Pain (7 - 10)    gabapentin 200 milliGRAM(s) Oral two times a day      Allergies    No Known Allergies    Intolerances        REVIEW OF SYSTEMS:  CONSTITUTIONAL: No fever, weight loss, or fatigue  EYES: No eye pain, visual disturbances, or discharge  ENMT:  No difficulty hearing, tinnitus, vertigo; No sinus or throat pain  NECK: No pain or stiffness  BREASTS: No pain, masses, or nipple discharge  RESPIRATORY: No cough, wheezing, chills or hemoptysis; No shortness of breath  CARDIOVASCULAR: No chest pain, palpitations, dizziness, or leg swelling  GASTROINTESTINAL: No abdominal or epigastric pain. No nausea, vomiting, or hematemesis; No diarrhea or constipation. No melena or hematochezia.  GENITOURINARY: No dysuria, frequency, hematuria, or incontinence  NEUROLOGICAL: No headaches, memory loss, loss of strength, numbness, or tremors  SKIN: No itching, burning, rashes, or lesions   LYMPH NODES: No enlarged glands  ENDOCRINE: No heat or cold intolerance; No hair loss; No polydipsia or polyuria  MUSCULOSKELETAL: No joint pain or swelling; No muscle, back, or extremity pain  PSYCHIATRIC: No depression, anxiety, mood swings, or difficulty sleeping  HEME/LYMPH: No easy bruising, or bleeding gums  ALLERGY AND IMMUNOLOGIC: No hives or eczema    Vital Signs Last 24 Hrs  T(C): 37 (27 Jul 2018 12:42), Max: 37.1 (26 Jul 2018 18:20)  T(F): 98.6 (27 Jul 2018 12:42), Max: 98.8 (26 Jul 2018 18:20)  HR: 94 (27 Jul 2018 12:42) (89 - 99)  BP: 152/77 (27 Jul 2018 12:42) (116/72 - 152/77)  BP(mean): --  RR: 18 (27 Jul 2018 12:42) (18 - 20)  SpO2: 96% (27 Jul 2018 12:42) (95% - 99%)    PHYSICAL EXAM:  GENERAL: NAD, well-groomed, well-developed  HEAD:  Atraumatic, Normocephalic  EYES: EOMI, PERRLA, conjunctiva and sclera clear  ENMT: No tonsillar erythema, exudates, or enlargement; Moist mucous membranes, Good dentition, No lesions  NECK: Supple, No JVD, Normal thyroid  NERVOUS SYSTEM:  Alert & Oriented X3, Good concentration; Motor Strength 5/5 B/L upper and lower extremities; DTRs 2+ intact and symmetric  CHEST/LUNG: Clear to auscultation bilaterally; No rales, rhonchi, wheezing, or rubs  HEART: Regular rate and rhythm; No murmurs, rubs, or gallops  ABDOMEN: Soft, Nontender, Nondistended; Bowel sounds present  EXTREMITIES:  2+ Peripheral Pulses, No clubbing, cyanosis, or edema  LYMPH: No lymphadenopathy noted  SKIN: No rashes or lesions    LABS:                        10.5   14.3  )-----------( 580      ( 27 Jul 2018 06:06 )             32.8     27 Jul 2018 06:06    138    |  102    |  15     ----------------------------<  138    4.5     |  24     |  0.65     Ca    9.6        27 Jul 2018 06:06      PT/INR - ( 27 Jul 2018 06:06 )   PT: 10.5 sec;   INR: 0.96 ratio         PTT - ( 27 Jul 2018 06:06 )  PTT:49.2 sec  CAPILLARY BLOOD GLUCOSE        BLOOD CULTURE    RADIOLOGY & ADDITIONAL TESTS:    Imaging Personally Reviewed:  [ ] YES     Consultant(s) Notes Reviewed:      Care Discussed with Consultants/Other Providers:
Follow Up:  discitis, osteomyelitis with epidural abscess    Interval History: pt stable and afebrile, used cocaine over night in the hospital    ROS:      All other systems negative    Constitutional: no fever, no chills, no weight loss, no night sweats  Eye: no eye pain, no redness, no vision changes  ENT:  no sore throat, no rhinorrhea  Cardiovascular:  no chest pain, no palpitation  Respiratory:  no SOB, no cough  GI:  no abd pain, no vomiting, no diarrhea  urinary: no dysuria, no hematuria, no flank pain  : no  discharge or bleeding  musculoskeletal: pain in the buttock area  skin:  no rash  neurology:  no headache, no seizure, no change in mental status  psych:  anxiety, no depression    Allergies  No Known Allergies        ANTIMICROBIALS:      OTHER MEDS:  acetaminophen   Tablet. 650 milliGRAM(s) Oral every 6 hours PRN  gabapentin 200 milliGRAM(s) Oral two times a day  oxyCODONE    5 mG/acetaminophen 325 mG 1 Tablet(s) Oral every 6 hours PRN  oxyCODONE    5 mG/acetaminophen 325 mG 2 Tablet(s) Oral every 6 hours PRN      Vital Signs Last 24 Hrs  T(C): 37 (27 Jul 2018 12:42), Max: 37.1 (26 Jul 2018 18:20)  T(F): 98.6 (27 Jul 2018 12:42), Max: 98.8 (26 Jul 2018 18:20)  HR: 94 (27 Jul 2018 12:42) (89 - 99)  BP: 152/77 (27 Jul 2018 12:42) (116/72 - 152/77)  BP(mean): --  RR: 18 (27 Jul 2018 12:42) (18 - 20)  SpO2: 96% (27 Jul 2018 12:42) (96% - 99%)    Physical Exam:    General:    NAD, non toxic  Head: atraumatic, normocephalic  Eyes: normal sclera and conjunctiva  ENT:   no oropharyngeal lesions, no LAD, neck supple  Cardio:    regular S1,S2, no murmur  Respiratory:   clear b/l, no wheezing  abd:   soft, BS +, not tender, no hepatosplenomegaly  :     no CVAT, no suprapubic tenderness, no ryan  Musculoskeletal : spine surgical scar well healed, no fluctuation  Skin:    no rash  vascular: no lines, normal pulses  Neurologic:     no focal deficits  psych: normal affect, no suicidal ideation                          10.5   14.3  )-----------( 580      ( 27 Jul 2018 06:06 )             32.8       07-27    138  |  102  |  15  ----------------------------<  138<H>  4.5   |  24  |  0.65    Ca    9.6      27 Jul 2018 06:06            MICROBIOLOGY:  v            RADIOLOGY:  Images below reviewed personally    < from: CT Lumbar Spine No Cont (07.26.18 @ 15:19) >  IMPRESSION: Redemonstration of laminectomy changes from L3-L5. The   previously noted noted enhancing anterior epidural collection spanning   these levels is not well evaluated on noncontrast CT modality. Previous   noted possible discitis/osteomyelitis at the L4-L5 and L5-S1 levels is   not well evaluated on CT modality.    Age indeterminate L2 spinousprocess fracture.

## 2018-07-27 NOTE — CHART NOTE - NSCHARTNOTEFT_GEN_A_CORE
Patient requesting to leave the hospital. She states that she does not want to get an MRI done, and that she just wants to go home. I explained to the patient why she needed the MRI imaging, and the need for further evaluation of any spinal process especially in light of her fall this morning. The risks of leaving without completing the workup was discussed in detail, including risk of the possible infection spreading, risk of developing neurological deficits, risk of falls at home, as well as risk of death from uncontrolled infection. She stated that she understood, but wanted to go home regardless. She would like to have everything done as an outpatient.     The patient will be signing out Against Medical Advice.    RN Manager Shelby was also present for the conversation as detailed above.

## 2018-07-27 NOTE — DISCHARGE NOTE ADULT - MEDICATION SUMMARY - MEDICATIONS TO TAKE
I will START or STAY ON the medications listed below when I get home from the hospital:    Tylenol 325 mg oral tablet  -- 2 tab(s) by mouth every 6 hours - for mild pain  -- Indication: For You left against medical advice.    Vicodin 5 mg-300 mg oral tablet  -- 2 tab(s) by mouth every 6 hours  -- Indication: For You left against medical advice.    gabapentin 100 mg oral tablet  -- 2 tab(s) by mouth 2 times a day  -- Indication: For You left against medical advice.

## 2018-07-27 NOTE — DISCHARGE NOTE ADULT - HOSPITAL COURSE
62F pmh DVT (resolved, no longer on anticoagulation), fibromyalgia, L3-L5 ?discitis/osteomyelitis with subarachnoiditis and mass vs phlegmon s/p resection of epidural mass 4/17/18, now with +MRI new osteo L3-L5.     Spinal abscess:    Falls: history of falls with one fall in the hospital on 7/27. 62F pmh DVT (resolved, no longer on anticoagulation), fibromyalgia, L3-L5 ?discitis/osteomyelitis with subarachnoiditis and mass vs phlegmon s/p resection of epidural mass 4/17/18, now with +MRI new osteo L3-L5.     Spinal abscess: came to the hospital. Patient denies fever, pain. Initially presented with plan for IR biopsy of the bone. IR said that the yield of the biopsy is low. Neurology was consulted and will see patient outpatient. Per ID recs, MRI w/ contrast of the lumbar spine, which showed  Patient kept off antibiotics due to lack of symptoms.  While inpatient, patient was witnessed of using cocaine. She admits to using it.    Falls: history of falls with one fall in the hospital on 7/27. Xray of ankle, knee, sacrum and wrist show 62F pmh DVT (resolved, no longer on anticoagulation), fibromyalgia, L3-L5 ?discitis/osteomyelitis with subarachnoiditis and mass vs phlegmon s/p resection of epidural mass 4/17/18, now with +MRI new osteo L3-L5.     Spinal abscess: came to the hospital. Patient denies fever, pain. Initially presented with plan for IR biopsy of the bone. IR said that the yield of the biopsy is low. Neurology was consulted and will see patient outpatient. Per ID recs, MRI w/ contrast of the lumbar spine, patient refuse MRI and decided to leave AMA.  Patient kept off antibiotics due to lack of symptoms while in the hospital.  While inpatient, patient was  alsowitnessed of using cocaine. She admitted to using it.    Falls: history of falls with one fall in the hospital on 7/27. Xray of ankle, knee, sacrum and wrist performed. Results pending. Patient decide to leave AMA

## 2018-07-27 NOTE — PROVIDER CONTACT NOTE (OTHER) - SITUATION
Pt noncompliant. Refusing safety measures; pt found on floor. Chair alarm and bed alarm were activated.

## 2018-07-27 NOTE — DISCHARGE NOTE ADULT - CARE PROVIDER_API CALL
Rose Rea (MD; PhD), Neurological Surgery  [address] [address]  Phone: [phone]  Fax: [phone]

## 2018-07-27 NOTE — DISCHARGE NOTE ADULT - PATIENT PORTAL LINK FT
You can access the [facility] Patient Portal, offered by [facility], by registering with the following website: [email or web address]

## 2018-07-27 NOTE — PROVIDER CONTACT NOTE (OTHER) - ACTION/TREATMENT ORDERED:
MD notified, Pt was not willing to give RN substance, upon security search nothing was found pt claims she "flushed it", 26 pills of percocet of pt own meds found on patient.  Education provided to pt

## 2018-07-27 NOTE — PROVIDER CONTACT NOTE (OTHER) - BACKGROUND
Pt admitted for subdural abscess s/p laminectomy 4/2018; Pt was in bathroom and left purse open on the bed where RN visualized a small pink bag with white powdery substance

## 2018-07-27 NOTE — PROGRESS NOTE ADULT - PROBLEM SELECTOR PLAN 1
-New abscess on MRI on 7/19 although patient is asymptomatic.  -IR refuse to biopsy/drain, Neurosurgeon will follow outpatient  -ID recs off antibiotics and observe for now -New abscess on MRI on 7/19 although patient is asymptomatic  -IR refuse to biopsy, Neurosurgeon will follow outpatient  -ID recs off antibiotics and observe for now  -MR spine with con, will call IR if abscess is drainable  -BCx NGTD

## 2018-07-27 NOTE — DISCHARGE NOTE ADULT - CARE PLAN
Principal Discharge DX:	Epidural abscess  Goal:	drain the abscess and treat it antibiotic  Assessment and plan of treatment:	you are admitted for the management of the spinal abscess. Infectious disease recommends a MRI with contrast to better assess the abscess before consulting IR for drainage, but you decide to leave against medical advice.

## 2018-07-27 NOTE — CHART NOTE - NSCHARTNOTEFT_GEN_A_CORE
Made aware that patient had a fall. Upon entering the room, the patient was lying on the ground, AAOx4. States that she was standing and walking to the window, and tripped over her L ankle and fell. Denies any LOC, head trauma, CP, SOB, NVD, neurological changes or deficits. The patient complains of L ankle, R wrist, L knee and pain in her L gluteus pedrito. States that she fell on her gluteus pedrito.     On PE, Vitals were stable.     PHYSICAL EXAM:    GENERAL: Comfortable, no acute distress   HEAD:  Normocephalic, atraumatic  EYES: EOMI, PERRLA  HEENT: Moist mucous membranes  NECK: Supple, No JVD  NERVOUS SYSTEM:  CN 2-12 intact b/l. Alert & Oriented X3, Motor Strength 5/5 B/L upper and lower extremities. Sensation intact B/L  CHEST/LUNG: Clear to auscultation bilaterally  HEART: Regular rate and rhythm, no murmur   ABDOMEN: Soft, Nontender, Nondistended, Bowel sounds present  EXTREMITIES:   No clubbing, cyanosis, or edema  MUSCULOSKELETAL: No muscle tenderness, no joint tenderness  SKIN: warm and dry, no rash       Plan for X ray of L ankle, R wrist, L knee, sacrum. Fall precautions. PT consultation.     Of note, patient endorses cocaine usage overnight. Instructed patient that this is unacceptable. Patient states she flushed it all down the toilet. Nurse manager, Pilar, made aware.    Beau Garg MD  PGY3 Internal Medicine Resident  Pager: [phone] - [place]  [phone] - [facility] Made aware that patient had a fall. Upon entering the room, the patient was lying on the ground, AAOx4. States that she was standing and walking to the window, and tripped over her L ankle and fell. Denies any LOC, head trauma, CP, SOB, NVD, neurological changes or deficits. The patient complains of L ankle, R wrist, L knee and pain in her L gluteus pedrito. States that she fell on her gluteus pedrito.     On PE, Vitals were stable.     PHYSICAL EXAM:    GENERAL: Comfortable, no acute distress   HEAD:  Normocephalic, atraumatic  EYES: EOMI, PERRLA  HEENT: Moist mucous membranes  NECK: Supple, No JVD  NERVOUS SYSTEM:  CN 2-12 intact b/l. Alert & Oriented X3, Motor Strength 5/5 B/L upper and lower extremities. Sensation intact B/L  CHEST/LUNG: Clear to auscultation bilaterally  HEART: Regular rate and rhythm, no murmur   ABDOMEN: Soft, Nontender, Nondistended, Bowel sounds present  EXTREMITIES:   No clubbing, cyanosis, or edema  MUSCULOSKELETAL: No muscle tenderness, no joint tenderness  SKIN: warm and dry, no rash       Plan for X ray of L ankle, R wrist, L knee, sacrum. Fall precautions. PT consultation.     Of note, patient endorses cocaine usage overnight. Instructed patient that this is unacceptable. Patient states she flushed it all down the toilet. Nurse manager, Pilar, made aware. Patient refusing SW intervention, rehab or psychiatric consultation.    Beau Garg MD  PGY3 Internal Medicine Resident  Pager: [phone] - [place]  [phone] - [facility]

## 2018-07-27 NOTE — DISCHARGE NOTE ADULT - PLAN OF CARE
drain the abscess and treat it antibiotic you are admitted for the management of the spinal abscess. Infectious disease recommends a MRI with contrast to better assess the abscess before consulting IR for drainage, but you decide to leave against medical advice.

## 2018-07-30 ENCOUNTER — RX RENEWAL (OUTPATIENT)
Age: 63
End: 2018-07-30

## 2018-07-30 RX ORDER — ACETAMINOPHEN AND CODEINE 300; 30 MG/1; MG/1
300-30 TABLET ORAL TWICE DAILY
Qty: 28 | Refills: 0 | Status: DISCONTINUED | COMMUNITY
Start: 2017-05-19 | End: 2018-07-30

## 2018-07-30 RX ORDER — HYDROCODONE BITARTRATE AND ACETAMINOPHEN 5; 325 MG/1; MG/1
5-325 TABLET ORAL
Qty: 28 | Refills: 0 | Status: DISCONTINUED | COMMUNITY
Start: 2018-07-06 | End: 2018-07-30

## 2018-07-31 DIAGNOSIS — D86.9 SARCOIDOSIS, UNSPECIFIED: ICD-10-CM

## 2018-07-31 LAB
CULTURE RESULTS: SIGNIFICANT CHANGE UP
CULTURE RESULTS: SIGNIFICANT CHANGE UP
DRUG SCREEN, SERUM: SIGNIFICANT CHANGE UP
SPECIMEN SOURCE: SIGNIFICANT CHANGE UP
SPECIMEN SOURCE: SIGNIFICANT CHANGE UP

## 2018-08-03 ENCOUNTER — LABORATORY RESULT (OUTPATIENT)
Age: 63
End: 2018-08-03

## 2018-08-03 ENCOUNTER — APPOINTMENT (OUTPATIENT)
Dept: INTERNAL MEDICINE | Facility: CLINIC | Age: 63
End: 2018-08-03
Payer: COMMERCIAL

## 2018-08-03 ENCOUNTER — RX RENEWAL (OUTPATIENT)
Age: 63
End: 2018-08-03

## 2018-08-03 DIAGNOSIS — M54.16 RADICULOPATHY, LUMBAR REGION: ICD-10-CM

## 2018-08-03 PROCEDURE — 99495 TRANSJ CARE MGMT MOD F2F 14D: CPT

## 2018-08-04 PROBLEM — M54.16 LUMBAR RADICULOPATHY: Status: ACTIVE | Noted: 2017-05-08

## 2018-08-04 NOTE — ASSESSMENT
[FreeTextEntry1] : Requests Rx fro antibiotics just in case spinal surgical area becomes infected. \par Instructions on on when to use, and admonishment to call spinal surgeon if uses.

## 2018-08-04 NOTE — HISTORY OF PRESENT ILLNESS
[Post-hospitalization from ___ Hospital] : Post-hospitalization from [unfilled] Hospital [Admitted on: ___] : The patient was admitted on [unfilled] [Discharged on ___] : discharged on [unfilled] [Discharge Summary] : discharge summary [Pertinent Labs] : pertinent labs [Radiology Findings] : radiology findings [Discharge Med List] : discharge medication list [Med Reconciliation] : medication reconciliation has been completed [Patient Contacted By: ____] : and contacted by [unfilled] [FreeTextEntry2] : Admitted for concern about spinal abscess vs. phlegmon after spinal surgery that occurred in April. Was seen by ID who advocated MRI, but declined.  Before leaving AMA fell due to weakness in LLE from prior spinal issue, fracturing coccyx.\par Presents now for help with pain management.  Marked pain with sitting in coccyx area.  Also has residual pain, parathesia and weakness of LLE.  Was discharged on ibuprofen, which is not helping. \par \par Denies headache, dizziness.\par Denies rash, fatigue, fever, weight loss, anorexia.\par Denies cough, wheezing.\par Denies CP, SOB, MAHMOOD, edema, palpitations.\par Denies abdominal pain, N/V/D/C. Denies BRBPR, melena\par Denies dysuria, frequency, hematuria.

## 2018-08-04 NOTE — PHYSICAL EXAM
[Uncomfortable] : uncomfortable [Incision CDI] : was clean, dry, and intact [L-Spine ___ (level)] : ~Ulevel [unfilled] lumbar spine [] : coccyx [Deferred] : Deferred [Normal (Right)] : Foot and ankle strength was normal on the right side [___(/5)] : [unfilled]/5 on the left side [Moderate Complexity requires multiple possible diagnoses and/or the management options, moderate complexity of the medical data (tests, etc.) to be reviewed, and moderate risk of significant complications, morbidity, and/or mortality as well as co-morbidi] : Moderate Complexity

## 2018-08-06 LAB
ACE BLD-CCNC: 29 U/L
ALBUMIN MFR SERPL ELPH: 51.1 %
ALBUMIN SERPL ELPH-MCNC: 4 G/DL
ALBUMIN SERPL-MCNC: 3.6 G/DL
ALBUMIN/GLOB SERPL: 1 RATIO
ALP BLD-CCNC: 140 U/L
ALPHA1 GLOB MFR SERPL ELPH: 6.3 %
ALPHA1 GLOB SERPL ELPH-MCNC: 0.4 G/DL
ALPHA2 GLOB MFR SERPL ELPH: 14.9 %
ALPHA2 GLOB SERPL ELPH-MCNC: 1.1 G/DL
ALT SERPL-CCNC: 6 U/L
ANION GAP SERPL CALC-SCNC: 20 MMOL/L
AST SERPL-CCNC: 13 U/L
B-GLOBULIN MFR SERPL ELPH: 15 %
B-GLOBULIN SERPL ELPH-MCNC: 1.1 G/DL
BASOPHILS # BLD AUTO: 0.03 K/UL
BASOPHILS NFR BLD AUTO: 0.3 %
BILIRUB SERPL-MCNC: <0.2 MG/DL
BUN SERPL-MCNC: 14 MG/DL
CALCIUM SERPL-MCNC: 9.7 MG/DL
CHLORIDE SERPL-SCNC: 101 MMOL/L
CO2 SERPL-SCNC: 22 MMOL/L
CREAT SERPL-MCNC: 0.6 MG/DL
CRP SERPL-MCNC: 7.38 MG/DL
DEPRECATED KAPPA LC FREE/LAMBDA SER: 1 RATIO
DSDNA AB SER-ACNC: <12 IU/ML
ENA RNP AB SER IA-ACNC: 0.4 AL
ENA SM AB SER IA-ACNC: <0.2 AL
ENA SS-A AB SER IA-ACNC: <0.2 AL
ENA SS-B AB SER IA-ACNC: <0.2 AL
EOSINOPHIL # BLD AUTO: 0.32 K/UL
EOSINOPHIL NFR BLD AUTO: 2.7 %
ERYTHROCYTE [SEDIMENTATION RATE] IN BLOOD BY WESTERGREN METHOD: 67 MM/HR
GAMMA GLOB FLD ELPH-MCNC: 0.9 G/DL
GAMMA GLOB MFR SERPL ELPH: 12.7 %
GLUCOSE SERPL-MCNC: 93 MG/DL
HCT VFR BLD CALC: 32.9 %
HGB BLD-MCNC: 10.7 G/DL
IGA SER QL IEP: 347 MG/DL
IGG SER QL IEP: 905 MG/DL
IGM SER QL IEP: 110 MG/DL
IMM GRANULOCYTES NFR BLD AUTO: 0.3 %
INTERPRETATION SERPL IEP-IMP: NORMAL
KAPPA LC CSF-MCNC: 2.15 MG/DL
KAPPA LC SERPL-MCNC: 2.15 MG/DL
LYMPHOCYTES # BLD AUTO: 2.24 K/UL
LYMPHOCYTES NFR BLD AUTO: 18.7 %
M PROTEIN SPEC IFE-MCNC: NORMAL
MAN DIFF?: NORMAL
MCHC RBC-ENTMCNC: 29.3 PG
MCHC RBC-ENTMCNC: 32.5 GM/DL
MCV RBC AUTO: 90.1 FL
MONOCYTES # BLD AUTO: 0.62 K/UL
MONOCYTES NFR BLD AUTO: 5.2 %
MPO AB + PR3 PNL SER: NORMAL
NEUTROPHILS # BLD AUTO: 8.75 K/UL
NEUTROPHILS NFR BLD AUTO: 72.8 %
PLATELET # BLD AUTO: 578 K/UL
POTASSIUM SERPL-SCNC: 4.8 MMOL/L
PROT SERPL-MCNC: 7.1 G/DL
RBC # BLD: 3.65 M/UL
RBC # FLD: 15.5 %
SODIUM SERPL-SCNC: 143 MMOL/L
WBC # FLD AUTO: 12 K/UL

## 2018-08-07 LAB — IGA 24H UR QL IFE: NORMAL

## 2018-08-14 LAB
AMPHET UR-MCNC: NEGATIVE
ANA SER IF-ACNC: NEGATIVE
BARBITURATES UR-MCNC: NEGATIVE
BENZODIAZ UR-MCNC: NEGATIVE
COCAINE METAB.OTHER UR-MCNC: NORMAL
CREATININE, URINE: 17.2 MG/DL
METHADONE UR-MCNC: NEGATIVE
METHAQUALONE UR-MCNC: NEGATIVE
OPIATES UR-MCNC: NEGATIVE
PCP UR-MCNC: NEGATIVE
PROPOXYPH UR QL: NEGATIVE
THC UR QL: NEGATIVE

## 2018-08-23 ENCOUNTER — RX RENEWAL (OUTPATIENT)
Age: 63
End: 2018-08-23

## 2018-08-27 ENCOUNTER — APPOINTMENT (OUTPATIENT)
Dept: INTERNAL MEDICINE | Facility: CLINIC | Age: 63
End: 2018-08-27

## 2018-11-04 ENCOUNTER — OTHER (OUTPATIENT)
Age: 63
End: 2018-11-04

## 2018-11-09 NOTE — PROGRESS NOTE ADULT - PROBLEM SELECTOR PROBLEM 2
Fibromyalgia
Arachnoiditis
Fibromyalgia
Arachnoiditis
no

## 2019-01-22 ENCOUNTER — APPOINTMENT (OUTPATIENT)
Dept: INTERNAL MEDICINE | Facility: CLINIC | Age: 64
End: 2019-01-22

## 2019-04-22 NOTE — PHYSICAL THERAPY INITIAL EVALUATION ADULT - LEVEL OF INDEPENDENCE: STAND/SIT, REHAB EVAL
POST-OP DIAGNOSIS:  Norris grade D esophagitis 22-Apr-2019 12:29:57  LALY Young  Gastric outlet obstruction 22-Apr-2019 12:29:43  LALY Young  Multiple gastric ulcers 22-Apr-2019 12:29:29  LALY Young
minimum assist (75% patients effort)
moderate assist (50% patients effort)

## 2019-05-29 NOTE — PROGRESS NOTE ADULT - ATTENDING COMMENTS
No Ms. Nguyen looks great today, mild pain: d/c opioids, NSAIDs and tylenol instead.   Await pathology.  PT.   Hold on steroids for now so as to not interfere with wound healing. Path pending.   Symptoms minimal at this point.

## 2019-10-08 ENCOUNTER — TRANSCRIPTION ENCOUNTER (OUTPATIENT)
Age: 64
End: 2019-10-08

## 2019-12-20 ENCOUNTER — APPOINTMENT (OUTPATIENT)
Dept: SURGICAL ONCOLOGY | Facility: CLINIC | Age: 64
End: 2019-12-20
Payer: COMMERCIAL

## 2019-12-20 VITALS
SYSTOLIC BLOOD PRESSURE: 148 MMHG | HEART RATE: 94 BPM | OXYGEN SATURATION: 94 % | HEIGHT: 59 IN | RESPIRATION RATE: 18 BRPM | BODY MASS INDEX: 30.84 KG/M2 | DIASTOLIC BLOOD PRESSURE: 86 MMHG | WEIGHT: 153 LBS

## 2019-12-20 DIAGNOSIS — R22.32 LOCALIZED SWELLING, MASS AND LUMP, LEFT UPPER LIMB: ICD-10-CM

## 2019-12-20 PROCEDURE — 99203 OFFICE O/P NEW LOW 30 MIN: CPT

## 2019-12-24 ENCOUNTER — OUTPATIENT (OUTPATIENT)
Dept: OUTPATIENT SERVICES | Facility: HOSPITAL | Age: 64
LOS: 1 days | End: 2019-12-24
Payer: COMMERCIAL

## 2019-12-24 ENCOUNTER — APPOINTMENT (OUTPATIENT)
Dept: MRI IMAGING | Facility: CLINIC | Age: 64
End: 2019-12-24
Payer: COMMERCIAL

## 2019-12-24 DIAGNOSIS — Z00.8 ENCOUNTER FOR OTHER GENERAL EXAMINATION: ICD-10-CM

## 2019-12-24 PROCEDURE — 70551 MRI BRAIN STEM W/O DYE: CPT | Mod: 26

## 2019-12-24 PROCEDURE — 70551 MRI BRAIN STEM W/O DYE: CPT

## 2020-01-06 ENCOUNTER — FORM ENCOUNTER (OUTPATIENT)
Age: 65
End: 2020-01-06

## 2020-01-07 ENCOUNTER — OUTPATIENT (OUTPATIENT)
Dept: OUTPATIENT SERVICES | Facility: HOSPITAL | Age: 65
LOS: 1 days | End: 2020-01-07
Payer: COMMERCIAL

## 2020-01-07 ENCOUNTER — APPOINTMENT (OUTPATIENT)
Dept: RADIOLOGY | Facility: CLINIC | Age: 65
End: 2020-01-07
Payer: COMMERCIAL

## 2020-01-07 ENCOUNTER — FORM ENCOUNTER (OUTPATIENT)
Age: 65
End: 2020-01-07

## 2020-01-07 DIAGNOSIS — R22.32 LOCALIZED SWELLING, MASS AND LUMP, LEFT UPPER LIMB: ICD-10-CM

## 2020-01-07 PROCEDURE — 73060 X-RAY EXAM OF HUMERUS: CPT | Mod: 26,LT

## 2020-01-07 PROCEDURE — 73060 X-RAY EXAM OF HUMERUS: CPT

## 2020-01-07 PROCEDURE — 71046 X-RAY EXAM CHEST 2 VIEWS: CPT | Mod: 26

## 2020-01-07 PROCEDURE — 71046 X-RAY EXAM CHEST 2 VIEWS: CPT

## 2020-01-08 ENCOUNTER — OUTPATIENT (OUTPATIENT)
Dept: OUTPATIENT SERVICES | Facility: HOSPITAL | Age: 65
LOS: 1 days | End: 2020-01-08
Payer: COMMERCIAL

## 2020-01-08 ENCOUNTER — APPOINTMENT (OUTPATIENT)
Dept: ULTRASOUND IMAGING | Facility: CLINIC | Age: 65
End: 2020-01-08
Payer: COMMERCIAL

## 2020-01-08 DIAGNOSIS — R22.32 LOCALIZED SWELLING, MASS AND LUMP, LEFT UPPER LIMB: ICD-10-CM

## 2020-01-08 PROCEDURE — 76882 US LMTD JT/FCL EVL NVASC XTR: CPT | Mod: 26,LT

## 2020-01-08 PROCEDURE — 76882 US LMTD JT/FCL EVL NVASC XTR: CPT

## 2020-01-13 ENCOUNTER — OUTPATIENT (OUTPATIENT)
Dept: OUTPATIENT SERVICES | Facility: HOSPITAL | Age: 65
LOS: 1 days | End: 2020-01-13
Payer: COMMERCIAL

## 2020-01-13 VITALS
HEART RATE: 90 BPM | SYSTOLIC BLOOD PRESSURE: 144 MMHG | HEIGHT: 59.5 IN | OXYGEN SATURATION: 96 % | DIASTOLIC BLOOD PRESSURE: 88 MMHG | TEMPERATURE: 98 F | WEIGHT: 164.91 LBS | RESPIRATION RATE: 16 BRPM

## 2020-01-13 DIAGNOSIS — R22.9 LOCALIZED SWELLING, MASS AND LUMP, UNSPECIFIED: ICD-10-CM

## 2020-01-13 DIAGNOSIS — Z01.818 ENCOUNTER FOR OTHER PREPROCEDURAL EXAMINATION: ICD-10-CM

## 2020-01-13 DIAGNOSIS — Z98.890 OTHER SPECIFIED POSTPROCEDURAL STATES: Chronic | ICD-10-CM

## 2020-01-13 DIAGNOSIS — R22.32 LOCALIZED SWELLING, MASS AND LUMP, LEFT UPPER LIMB: ICD-10-CM

## 2020-01-13 DIAGNOSIS — Z98.891 HISTORY OF UTERINE SCAR FROM PREVIOUS SURGERY: Chronic | ICD-10-CM

## 2020-01-13 DIAGNOSIS — R22.2 LOCALIZED SWELLING, MASS AND LUMP, TRUNK: ICD-10-CM

## 2020-01-13 LAB
ANION GAP SERPL CALC-SCNC: 10 MMOL/L — SIGNIFICANT CHANGE UP (ref 5–17)
BUN SERPL-MCNC: 14 MG/DL — SIGNIFICANT CHANGE UP (ref 7–23)
CALCIUM SERPL-MCNC: 9 MG/DL — SIGNIFICANT CHANGE UP (ref 8.4–10.5)
CHLORIDE SERPL-SCNC: 103 MMOL/L — SIGNIFICANT CHANGE UP (ref 96–108)
CO2 SERPL-SCNC: 27 MMOL/L — SIGNIFICANT CHANGE UP (ref 22–31)
CREAT SERPL-MCNC: 0.64 MG/DL — SIGNIFICANT CHANGE UP (ref 0.5–1.3)
GLUCOSE SERPL-MCNC: 197 MG/DL — HIGH (ref 70–99)
HBA1C BLD-MCNC: 7.8 % — HIGH (ref 4–5.6)
HCT VFR BLD CALC: 40.1 % — SIGNIFICANT CHANGE UP (ref 34.5–45)
HGB BLD-MCNC: 13 G/DL — SIGNIFICANT CHANGE UP (ref 11.5–15.5)
MCHC RBC-ENTMCNC: 30.2 PG — SIGNIFICANT CHANGE UP (ref 27–34)
MCHC RBC-ENTMCNC: 32.4 GM/DL — SIGNIFICANT CHANGE UP (ref 32–36)
MCV RBC AUTO: 93 FL — SIGNIFICANT CHANGE UP (ref 80–100)
NRBC # BLD: 0 /100 WBCS — SIGNIFICANT CHANGE UP (ref 0–0)
PLATELET # BLD AUTO: 369 K/UL — SIGNIFICANT CHANGE UP (ref 150–400)
POTASSIUM SERPL-MCNC: 4.4 MMOL/L — SIGNIFICANT CHANGE UP (ref 3.5–5.3)
POTASSIUM SERPL-SCNC: 4.4 MMOL/L — SIGNIFICANT CHANGE UP (ref 3.5–5.3)
RBC # BLD: 4.31 M/UL — SIGNIFICANT CHANGE UP (ref 3.8–5.2)
RBC # FLD: 14.6 % — HIGH (ref 10.3–14.5)
SODIUM SERPL-SCNC: 140 MMOL/L — SIGNIFICANT CHANGE UP (ref 135–145)
WBC # BLD: 11.19 K/UL — HIGH (ref 3.8–10.5)
WBC # FLD AUTO: 11.19 K/UL — HIGH (ref 3.8–10.5)

## 2020-01-13 PROCEDURE — 36415 COLL VENOUS BLD VENIPUNCTURE: CPT

## 2020-01-13 PROCEDURE — 93010 ELECTROCARDIOGRAM REPORT: CPT | Mod: NC

## 2020-01-13 PROCEDURE — 93005 ELECTROCARDIOGRAM TRACING: CPT

## 2020-01-13 PROCEDURE — 83036 HEMOGLOBIN GLYCOSYLATED A1C: CPT

## 2020-01-13 PROCEDURE — 85027 COMPLETE CBC AUTOMATED: CPT

## 2020-01-13 PROCEDURE — G0463: CPT

## 2020-01-13 PROCEDURE — 80048 BASIC METABOLIC PNL TOTAL CA: CPT

## 2020-01-13 RX ORDER — GABAPENTIN 400 MG/1
2 CAPSULE ORAL
Qty: 0 | Refills: 0 | DISCHARGE

## 2020-01-13 NOTE — H&P PST ADULT - SKIN COMMENTS
left upper arm half dollar size firm, fixed non tender mass and left upper back pea sized firm, fixed, non tender mass

## 2020-01-13 NOTE — H&P PST ADULT - NSICDXFAMILYHX_GEN_ALL_CORE_FT
FAMILY HISTORY:  Father  Still living? No  Family history of arrhythmia, Age at diagnosis: Age Unknown  Family history of gout, Age at diagnosis: Age Unknown  FHx: coronary artery disease, Age at diagnosis: Age Unknown    Mother  Still living? No  Family history of osteoarthritis, Age at diagnosis: Age Unknown  FHx: coronary artery disease, Age at diagnosis: Age Unknown    Child  Still living? No  Family history of DVT, Age at diagnosis: Age Unknown  FHx: coronary artery disease, Age at diagnosis: Age Unknown

## 2020-01-13 NOTE — H&P PST ADULT - NSICDXPASTSURGICALHX_GEN_ALL_CORE_FT
PAST SURGICAL HISTORY:  H/O  section x 2 refused to say when    H/O excision of mass unsure what part of body or when surgery was done    History of lumbar laminectomy 2017 L4-5, L5-S1

## 2020-01-13 NOTE — H&P PST ADULT - ATTENDING COMMENTS
64-year-old lady with soft tissue masses of the left arm and left upper back schedule for excisions, with reconstructions by Dr. Sundar Barbour.    We reviewed her diagnosis in my office, and again the morning of operation.    All questions answered, consent on chart

## 2020-01-13 NOTE — H&P PST ADULT - ITE SK HX ROS MEA POS PC
Date of Service: 06/29/2018    PREOPERATIVE DIAGNOSIS:  The patient desires permanent sterilization by bilateral salpingectomies.    POSTOPERATIVE DIAGNOSIS:  The patient desires permanent sterilization by bilateral salpingectomies.    PROCEDURE:  Laparoscopic bilateral salpingectomies.    SURGEON:  Dinorah Jimenez MD.    ASSISTANT:  Yuli Matson SA.    ANESTHESIA:  General anesthesia with Dr. Larry Rollins.    FINDINGS:  Normal bilateral tubes and ovaries were noted.  The uterus was noted to be normal in size, retroverted.  Bladder was noted to be adhered to the lower uterine segment.  Total estimated blood loss was minimal.  There were no complications.    SPECIMEN:    Including bilateral tubes.    DESCRIPTION OF PROCEDURE:  The patient was taken to the operating room where she was given general anesthesia.  Then, she was placed in the dorsal lithotomy position and was prepped and draped in usual sterile fashion.  A Diallo catheter together with a nondisposable uterine manipulator was placed and then I  changed my gloves and a periumbilical incision was made with a knife and the fascia was grasped with a Kocher and then it was cut with the Metzenbaum scissors.  The peritoneal cavity was entered with a Pinky without any difficulty.  A 12 mm Mary was placed through the periumbilical incision and entrance into the peritoneal cavity was confirmed with the camera.  The patient was insufflated with carbon dioxide.  Checking the bowel, there was no sign of any injury.  The patient was placed in Trendelenburg position and a right 5-mm trocar was placed in the right lower quadrant under direct visualization.  The same thing was performed using a 5-mm trocar in the lower pelvis around 2 cm above the pubic symphysis.  There was no sign of any injury to the intestine or to the blood vessels.  The pelvis was explored with a probe and the above findings were noted.  Bilateral salpingectomies were performed using the LigaSure  and this was done from the fimbrial end to around 1 cm distal to the junction of the uterus and tubes.  Good hemostasis was achieved.  We checked the pelvis and no signs of any scarring except for the bladder to the lower uterine segment.  We were not able to visualize the appendix.  The upper abdomen looks normal.  We terminated the procedure.  We removed all the trocars under direct visualization.  There was no sign of any bleeding.  Then, we removed as much carbon dioxide from the abdomen as possible and then we removed the 12 mm trocar.  The periumbilical incision fascia was then closed with 2 [id number] using 1-0 Vicryl and the skin incision was closed with 4-0 Vicryl.  At the end of the procedure, removed the Diallo and also the nondisposable uterine manipulator.  Then, the patient was extubated and was taken to the recovery room in stable condition.  Sponge, needle and instrument counts were correct.      Dictated By: Dinorah Jimenez MD  Signing Provider: Dinorah Jimenez MD    BL/SP2 ([id number])  DD: 06/29/2018 12:54:35 TD: 06/29/2018 13:15:46    Copy Sent To:      left arm and back/masses

## 2020-01-13 NOTE — H&P PST ADULT - NSICDXPASTMEDICALHX_GEN_ALL_CORE_FT
PAST MEDICAL HISTORY:  Anemia     Cervical spinal stenosis     Fibromyalgia     Herniated disc, cervical unsure levels    Lumbar herniated disc unsure levels    Lumbar spinal stenosis     Mass of skin left arm and back    Obesity, Class I, BMI 30-34.9     Osteoarthritis     Pollen allergy     Seasonal allergies     Stuffy nose     Thoracic spinal stenosis     Urinary frequency PAST MEDICAL HISTORY:  Anemia     Cervical spinal stenosis     Fibromyalgia     Herniated disc, cervical unsure levels    Lumbar herniated disc unsure levels    Lumbar spinal stenosis     Mass of arm, left     Mass on back left upper    Obesity, Class I, BMI 30-34.9     Osteoarthritis     Pollen allergy     Seasonal allergies     Stuffy nose     Tendonitis right shoulder    Thoracic spinal stenosis     Urinary frequency

## 2020-01-13 NOTE — H&P PST ADULT - NSICDXPROBLEM_GEN_ALL_CORE_FT
PROBLEM DIAGNOSES  Problem: Mass on back  Assessment and Plan:     Problem: Arm mass, left  Assessment and Plan: resection left arm masswith complex closure. medical clearance requested. stop omega 3, vitamin E and milk thistle. pecid and surgical wash instructions reviewed and verbalized understanding.

## 2020-01-13 NOTE — H&P PST ADULT - PSYCHIATRIC COMMENTS
inappropriately alternating between crying and laughing. Rambling conversations and needed to be redirected frequently to answers questions. Agitated over being in the hospital and stated that she will never see a doctor again after this surgery is completed. During health maintainence questioning became agitated that she was being told to have testing done (ie. mammo and PAP) did not want any "of that testing done".

## 2020-01-13 NOTE — H&P PST ADULT - NSANTHOSAYNRD_GEN_A_CORE
No. BRAYDEN screening performed.  STOP BANG Legend: 0-2 = LOW Risk; 3-4 = INTERMEDIATE Risk; 5-8 = HIGH Risk/neck 15.5 inches

## 2020-01-13 NOTE — H&P PST ADULT - HISTORY OF PRESENT ILLNESS
65 yo female presents with mass to the left arm and left back for the last 4-6 months. She reports that the area initially looked like a pimple and she squeezed it and got some purulent drainage out and then the areas got bigger. Denies pain.

## 2020-01-23 ENCOUNTER — TRANSCRIPTION ENCOUNTER (OUTPATIENT)
Age: 65
End: 2020-01-23

## 2020-01-23 NOTE — ASU DISCHARGE PLAN (ADULT/PEDIATRIC) - PROCEDURE
Excision of lumps from the left arm and left back with reconstructions Excision of soft tissues masses from the left posterior upper arm and left back with immediate reconstruction of surgical defects

## 2020-01-23 NOTE — ASU DISCHARGE PLAN (ADULT/PEDIATRIC) - NURSING INSTRUCTIONS
all discharge safety follow up care to MD. Take any pain medication with a food item and not on an empty stomach. Rest at home eat lightly today avoid heavy and spicy foods. Practice good hand washinh

## 2020-01-23 NOTE — ASU DISCHARGE PLAN (ADULT/PEDIATRIC) - CARE PROVIDER_API CALL
Sundar Barbour (DO)  Surgery  [address] [place] [id number]  Phone: [phone]  Fax: [phone]  Follow Up Time: 2 weeks

## 2020-01-23 NOTE — ASU DISCHARGE PLAN (ADULT/PEDIATRIC) - CALL YOUR DOCTOR IF YOU HAVE ANY OF THE FOLLOWING:
Inability to tolerate liquids or foods/Swelling that gets worse/Nausea and vomiting that does not stop/Unable to urinate/Bleeding that does not stop/Excessive diarrhea/Pain not relieved by Medications/Wound/Surgical Site with redness, or foul smelling discharge or pus/Numbness, tingling, color or temperature change to extremity/Increased irritability or sluggishness Numbness, tingling, color or temperature change to extremity/Increased irritability or sluggishness/Bleeding that does not stop/Nausea and vomiting that does not stop/Unable to urinate/Swelling that gets worse/Fever greater than (need to indicate Fahrenheit or Celsius)/Wound/Surgical Site with redness, or foul smelling discharge or pus/Excessive diarrhea/Inability to tolerate liquids or foods/Pain not relieved by Medications

## 2020-01-23 NOTE — ASU DISCHARGE PLAN (ADULT/PEDIATRIC) - ASU DC SPECIAL INSTRUCTIONSFT
Initial followup with plastic surgery within the next 5-15 days.    Dr. Hutchinson will call with pathology report in approximately 2 weeks, that conversation will determine further management Follow up with Dr. Barbour in 2 weeks    Dr. Hutchinson will call with pathology report in approximately 2 weeks, that conversation will determine further management

## 2020-01-24 ENCOUNTER — RESULT REVIEW (OUTPATIENT)
Age: 65
End: 2020-01-24

## 2020-01-24 ENCOUNTER — OUTPATIENT (OUTPATIENT)
Dept: OUTPATIENT SERVICES | Facility: HOSPITAL | Age: 65
LOS: 1 days | End: 2020-01-24
Payer: COMMERCIAL

## 2020-01-24 ENCOUNTER — APPOINTMENT (OUTPATIENT)
Dept: SURGICAL ONCOLOGY | Facility: HOSPITAL | Age: 65
End: 2020-01-24

## 2020-01-24 VITALS
OXYGEN SATURATION: 99 % | DIASTOLIC BLOOD PRESSURE: 76 MMHG | SYSTOLIC BLOOD PRESSURE: 132 MMHG | RESPIRATION RATE: 16 BRPM | TEMPERATURE: 98 F | HEART RATE: 79 BPM

## 2020-01-24 VITALS
RESPIRATION RATE: 16 BRPM | TEMPERATURE: 98 F | OXYGEN SATURATION: 96 % | HEART RATE: 95 BPM | SYSTOLIC BLOOD PRESSURE: 121 MMHG | HEIGHT: 59.5 IN | DIASTOLIC BLOOD PRESSURE: 78 MMHG | WEIGHT: 164.91 LBS

## 2020-01-24 DIAGNOSIS — Z98.890 OTHER SPECIFIED POSTPROCEDURAL STATES: Chronic | ICD-10-CM

## 2020-01-24 DIAGNOSIS — R22.9 LOCALIZED SWELLING, MASS AND LUMP, UNSPECIFIED: ICD-10-CM

## 2020-01-24 DIAGNOSIS — Z98.891 HISTORY OF UTERINE SCAR FROM PREVIOUS SURGERY: Chronic | ICD-10-CM

## 2020-01-24 DIAGNOSIS — R22.32 LOCALIZED SWELLING, MASS AND LUMP, LEFT UPPER LIMB: ICD-10-CM

## 2020-01-24 LAB
BASOPHILS # BLD AUTO: 0.07 K/UL — SIGNIFICANT CHANGE UP (ref 0–0.2)
BASOPHILS NFR BLD AUTO: 0.7 % — SIGNIFICANT CHANGE UP (ref 0–2)
EOSINOPHIL # BLD AUTO: 0.32 K/UL — SIGNIFICANT CHANGE UP (ref 0–0.5)
EOSINOPHIL NFR BLD AUTO: 3.4 % — SIGNIFICANT CHANGE UP (ref 0–6)
GLUCOSE BLDC GLUCOMTR-MCNC: 127 MG/DL — HIGH (ref 70–99)
HCT VFR BLD CALC: 40.5 % — SIGNIFICANT CHANGE UP (ref 34.5–45)
HGB BLD-MCNC: 13.1 G/DL — SIGNIFICANT CHANGE UP (ref 11.5–15.5)
IMM GRANULOCYTES NFR BLD AUTO: 0.3 % — SIGNIFICANT CHANGE UP (ref 0–1.5)
LYMPHOCYTES # BLD AUTO: 2.25 K/UL — SIGNIFICANT CHANGE UP (ref 1–3.3)
LYMPHOCYTES # BLD AUTO: 23.8 % — SIGNIFICANT CHANGE UP (ref 13–44)
MCHC RBC-ENTMCNC: 30 PG — SIGNIFICANT CHANGE UP (ref 27–34)
MCHC RBC-ENTMCNC: 32.3 GM/DL — SIGNIFICANT CHANGE UP (ref 32–36)
MCV RBC AUTO: 92.9 FL — SIGNIFICANT CHANGE UP (ref 80–100)
MONOCYTES # BLD AUTO: 0.7 K/UL — SIGNIFICANT CHANGE UP (ref 0–0.9)
MONOCYTES NFR BLD AUTO: 7.4 % — SIGNIFICANT CHANGE UP (ref 2–14)
NEUTROPHILS # BLD AUTO: 6.07 K/UL — SIGNIFICANT CHANGE UP (ref 1.8–7.4)
NEUTROPHILS NFR BLD AUTO: 64.4 % — SIGNIFICANT CHANGE UP (ref 43–77)
NRBC # BLD: 0 /100 WBCS — SIGNIFICANT CHANGE UP (ref 0–0)
PLATELET # BLD AUTO: 387 K/UL — SIGNIFICANT CHANGE UP (ref 150–400)
RBC # BLD: 4.36 M/UL — SIGNIFICANT CHANGE UP (ref 3.8–5.2)
RBC # FLD: 14.4 % — SIGNIFICANT CHANGE UP (ref 10.3–14.5)
WBC # BLD: 9.44 K/UL — SIGNIFICANT CHANGE UP (ref 3.8–10.5)
WBC # FLD AUTO: 9.44 K/UL — SIGNIFICANT CHANGE UP (ref 3.8–10.5)

## 2020-01-24 PROCEDURE — 82962 GLUCOSE BLOOD TEST: CPT

## 2020-01-24 PROCEDURE — 14021 TIS TRNFR S/A/L 10.1-30 SQCM: CPT | Mod: XS

## 2020-01-24 PROCEDURE — 36415 COLL VENOUS BLD VENIPUNCTURE: CPT

## 2020-01-24 PROCEDURE — 88312 SPECIAL STAINS GROUP 1: CPT | Mod: 26

## 2020-01-24 PROCEDURE — 24071 EXC ARM/ELBOW LES SC 3 CM/>: CPT | Mod: AS

## 2020-01-24 PROCEDURE — 24071 EXC ARM/ELBOW LES SC 3 CM/>: CPT

## 2020-01-24 PROCEDURE — 88305 TISSUE EXAM BY PATHOLOGIST: CPT

## 2020-01-24 PROCEDURE — 21931 EXC BACK LES SC 3 CM/>: CPT

## 2020-01-24 PROCEDURE — 11406 EXC TR-EXT B9+MARG >4.0 CM: CPT

## 2020-01-24 PROCEDURE — 21931 EXC BACK LES SC 3 CM/>: CPT | Mod: AS

## 2020-01-24 PROCEDURE — 85027 COMPLETE CBC AUTOMATED: CPT

## 2020-01-24 PROCEDURE — 88312 SPECIAL STAINS GROUP 1: CPT

## 2020-01-24 PROCEDURE — 88305 TISSUE EXAM BY PATHOLOGIST: CPT | Mod: 26

## 2020-01-24 RX ORDER — OXYCODONE HYDROCHLORIDE 5 MG/1
5 TABLET ORAL ONCE
Refills: 0 | Status: DISCONTINUED | OUTPATIENT
Start: 2020-01-24 | End: 2020-01-24

## 2020-01-24 RX ORDER — HEPARIN SODIUM 5000 [USP'U]/ML
5000 INJECTION INTRAVENOUS; SUBCUTANEOUS ONCE
Refills: 0 | Status: COMPLETED | OUTPATIENT
Start: 2020-01-24 | End: 2020-01-24

## 2020-01-24 RX ORDER — MILK THISTLE 180 MG
1 CAPSULE ORAL
Qty: 0 | Refills: 0 | DISCHARGE

## 2020-01-24 RX ORDER — ONDANSETRON 8 MG/1
4 TABLET, FILM COATED ORAL ONCE
Refills: 0 | Status: DISCONTINUED | OUTPATIENT
Start: 2020-01-24 | End: 2020-01-24

## 2020-01-24 RX ORDER — SODIUM CHLORIDE 9 MG/ML
1000 INJECTION, SOLUTION INTRAVENOUS
Refills: 0 | Status: DISCONTINUED | OUTPATIENT
Start: 2020-01-24 | End: 2020-01-24

## 2020-01-24 RX ORDER — HYDROMORPHONE HYDROCHLORIDE 2 MG/ML
0.5 INJECTION INTRAMUSCULAR; INTRAVENOUS; SUBCUTANEOUS
Refills: 0 | Status: DISCONTINUED | OUTPATIENT
Start: 2020-01-24 | End: 2020-01-24

## 2020-01-24 RX ADMIN — SODIUM CHLORIDE 50 MILLILITER(S): 9 INJECTION, SOLUTION INTRAVENOUS at 07:25

## 2020-01-24 RX ADMIN — HEPARIN SODIUM 5000 UNIT(S): 5000 INJECTION INTRAVENOUS; SUBCUTANEOUS at 07:31

## 2020-01-24 NOTE — ASU PATIENT PROFILE, ADULT - PSH
H/O  section  x 2 refused to say when  H/O excision of mass  unsure what part of body or when surgery was done  History of bunionectomy  right foot  History of lumbar laminectomy  2017 L4-5, L5-S1

## 2020-01-24 NOTE — ASU PATIENT PROFILE, ADULT - VISION (WITH CORRECTIVE LENSES IF THE PATIENT USUALLY WEARS THEM):
Partially impaired: cannot see medication labels or newsprint, but can see obstacles in path, and the surrounding layout; can count fingers at arm's length Normal vision: sees adequately in most situations; can see medication labels, newsprint/driving glasses

## 2020-01-24 NOTE — ASU PATIENT PROFILE, ADULT - PMH
Anemia    Cervical spinal stenosis    Fibromyalgia    Herniated disc, cervical  unsure levels  Lumbar herniated disc  unsure levels  Lumbar spinal stenosis    Mass of arm, left    Mass on back  left upper  Obesity, Class I, BMI 30-34.9    Osteoarthritis    Pollen allergy    Seasonal allergies    Stuffy nose    Tendonitis  right shoulder  Thoracic spinal stenosis    Urinary frequency

## 2020-01-24 NOTE — ASU PREOP CHECKLIST - PATIENT'S PERSONAL PROPERTY REMOVED
jewelry jewelry/patient unable to remove 2 rings OR aware jayeljojo/patient unable to remove 2 rings OR aware. 2 rings removed by Dr. Barbour and given to patient's spouse.

## 2020-02-03 LAB — SURGICAL PATHOLOGY STUDY: SIGNIFICANT CHANGE UP

## 2020-02-26 NOTE — REVIEW OF SYSTEMS
[Negative] : Heme/Lymph [de-identified] : Soft tissue mass of the posterior left arm and left back [de-identified] : Soft tissue mass of the posterior left arm and left back

## 2020-02-26 NOTE — ASSESSMENT
[FreeTextEntry1] : Clinically, the larger mass on the left arm, and a smaller lesion on the left back both appear to be epidermal inclusion cyst.\par \par X-ray and sonogram of the left arm mass are being arranged.\par Included a request for the preoperative chest x-ray.\par \par If unremarkable, at her request, we'll schedule excisions of both the left arm and left back, and coordinated with plastic surgery (Dr. Sundar Barbour).\par \par Reviewed in detail, all questions answered.\par \par Note dictated\par \par \par 01-09-20.\par I called her but had to be the voicemail.\par January 8, 2020, left arm x-ray identified no bony abnormality.\par Ultrasound of the mass on the left arm describes a 3.7 x 2.3 x 3.3 cm large complex collection.\par Differential diagnosis includes large sebaceous cyst or hematoma.\par Chest x-ray, clear lungs\par \par \par 01-10-20.\par We spoke.\par I reviewed the above information.\par Surgery is presently scheduled for January 24, 2020.\par \par \par 02-26-20.\par I called her, there was no answer, nor an answering machine.\par On January 24, 2020, she underwent excisions of masses from the left posterior arm,  and left upper back, with reconstructions by Dr. 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

## 2020-02-26 NOTE — REASON FOR VISIT
[Initial Consultation] : an initial consultation for [Other: _____] : [unfilled] [FreeTextEntry2] : Left arm mass

## 2020-02-26 NOTE — PHYSICAL EXAM
[Normal] : supple, no neck mass and thyroid not enlarged [Normal] : normal respiratory effort, chest percussion and palpation [Normal Neck Lymph Nodes] : normal neck lymph nodes  [Normal Groin Lymph Nodes] : normal groin lymph nodes [Normal Supraclavicular Lymph Nodes] : normal supraclavicular lymph nodes [Normal Axillary Lymph Nodes] : normal axillary lymph nodes [de-identified] : Above [de-identified] : Above

## 2020-05-07 PROBLEM — D64.9 ANEMIA, UNSPECIFIED: Chronic | Status: ACTIVE | Noted: 2020-01-13

## 2020-05-07 PROBLEM — R22.32 LOCALIZED SWELLING, MASS AND LUMP, LEFT UPPER LIMB: Chronic | Status: ACTIVE | Noted: 2020-01-13

## 2020-05-07 PROBLEM — M77.9 ENTHESOPATHY, UNSPECIFIED: Chronic | Status: ACTIVE | Noted: 2020-01-13

## 2020-05-07 PROBLEM — M19.90 UNSPECIFIED OSTEOARTHRITIS, UNSPECIFIED SITE: Chronic | Status: ACTIVE | Noted: 2020-01-13

## 2020-05-07 PROBLEM — M51.26 OTHER INTERVERTEBRAL DISC DISPLACEMENT, LUMBAR REGION: Chronic | Status: ACTIVE | Noted: 2020-01-13

## 2020-05-07 PROBLEM — M48.061 SPINAL STENOSIS, LUMBAR REGION WITHOUT NEUROGENIC CLAUDICATION: Chronic | Status: ACTIVE | Noted: 2020-01-13

## 2020-05-07 PROBLEM — E66.9 OBESITY, UNSPECIFIED: Chronic | Status: ACTIVE | Noted: 2020-01-13

## 2020-05-07 PROBLEM — R35.0 FREQUENCY OF MICTURITION: Chronic | Status: ACTIVE | Noted: 2020-01-13

## 2020-05-07 PROBLEM — R22.2 LOCALIZED SWELLING, MASS AND LUMP, TRUNK: Chronic | Status: ACTIVE | Noted: 2020-01-13

## 2020-05-08 ENCOUNTER — APPOINTMENT (OUTPATIENT)
Dept: ORTHOPEDIC SURGERY | Facility: CLINIC | Age: 65
End: 2020-05-08
Payer: COMMERCIAL

## 2020-05-08 VITALS
WEIGHT: 145 LBS | DIASTOLIC BLOOD PRESSURE: 85 MMHG | SYSTOLIC BLOOD PRESSURE: 139 MMHG | HEIGHT: 59 IN | HEART RATE: 102 BPM | TEMPERATURE: 96.5 F | BODY MASS INDEX: 29.23 KG/M2

## 2020-05-08 DIAGNOSIS — M25.562 PAIN IN LEFT KNEE: ICD-10-CM

## 2020-05-08 DIAGNOSIS — M17.12 UNILATERAL PRIMARY OSTEOARTHRITIS, LEFT KNEE: ICD-10-CM

## 2020-05-08 PROCEDURE — 99214 OFFICE O/P EST MOD 30 MIN: CPT

## 2020-05-08 PROCEDURE — 73564 X-RAY EXAM KNEE 4 OR MORE: CPT | Mod: LT

## 2020-05-08 NOTE — PHYSICAL EXAM
[de-identified] : Physical examination of the left knee discloses mild effusion with a valgus deformity.  Range of motion between 0 to 115 degrees with terminal tenderness [de-identified] : X-rays taken of the left knee and AP, lateral, skyline and open notch views disclose mild to moderate degenerative space narrowing with valgus deformity

## 2020-05-08 NOTE — DISCUSSION/SUMMARY
[de-identified] : Patient was informed of her findings and shown her x-rays.  She will be referred to physical therapy and she should be considered for Visco supplementation if her symptoms persist or worsen

## 2020-05-08 NOTE — HISTORY OF PRESENT ILLNESS
[Worsening] : worsening [___ wks] : [unfilled] week(s) ago [4] : a minimum pain level of 4/10 [10] : a maximum pain level of 10/10 [Walking] : walking [Constant] : ~He/She~ states the symptoms seem to be constant [Knee Flexion] : worsened with knee flexion [Ice] : relieved by ice [Rest] : relieved by rest [de-identified] : Pt returns for follow up with pain in her left knee. Pt states there is buckling, she is taking Advil for pain with no relief.  Pt states there is no known  injury.  Pt is taking advil for pain with no relief. Family hx arthritis DJD. [de-identified] : certain movements

## 2021-07-22 NOTE — HISTORY OF PRESENT ILLNESS
[de-identified] : 64-year-old lady referred by Dr. Sundar VAZQUEZ (plastic surgery) with a soft tissue mass of the posterior left arm which she first noticed ~July 2019.\par It is been slowly growing.\par She reports a similar, smaller lesion, over the same duration, on the left back.\par \par No other lumps or bumps presently.\par \par No personal history of malignancy.\par \par No relatives with cancer.\par \par Her internist is Dr. Ford BOBBY.\par \par No pacemaker or defibrillator.\par No anticoagulants.\par \par No prescription medications.\par \par She has not see a gynecologist since at least 2004.\par \par Last mammogram was also approximately 2004.\par \par She's never had a colonoscopy
pacemaker visualized, no atelectasis or focal consolidation, no pleural eff.

## 2021-09-22 NOTE — DISCHARGE NOTE ADULT - PROVIDER RX CONTACT NUMBER
"Shift [id number]    /77 (BP Location: Right arm)   Pulse 88   Temp 97.7  F (36.5  C) (Oral)   Resp 20   Ht 1.702 m (5' 7\")   Wt 59 kg (130 lb)   SpO2 98%   BMI 20.36 kg/m      Reason for admission: Abd pain   Activity: Independent.   Pain: Pt reports 8/10 abd pain. Pt receiving PO and IV dilaudid with relief.   Neuro: A&Ox4.   Cardiac: WDL. Denies c/p.  Respiratory: WDL. Denies SOB.   GI/: Pain at G tube, on int suction. Procedure today at 1530.   Extremities: WDL.   Diet: NPO.   Lines: PIV L, infusing LR 75.   Labs/imaging/Consults: Gold 10.   Discharge Plan: In progress.         " [phone]

## 2021-11-08 NOTE — H&P PST ADULT - NS SC CAGE ALCOHOL GUILTY ABOUT
Transcranial doppler exam #1 (11/8/21) 11am  mean velocity  cm/sec                              Left         Right  RAYNA                    91            77  MCA                   91           100   PCA                   50,37        38,45    VERT                   45             x  BA                              49  Official report to follow.  John no

## 2021-11-19 NOTE — ED PROVIDER NOTE - CARE PLAN
[facility]  Hospitalist Progress Note        Derick Guerin MD   11/19/2021        Interval History:        - reports generalized bodyache  - remains afebrile; leukocytosis improved---wbc 9.5   - blood cultures from 11/18 and 11/19 with no growth so far   - evaluated by cardiology and CT surgery: rec to continue medical management for now and follow clinical progress; plan to consider surgical options if clinically worsening         Assessment and Plan:        Bc German is a 40 year old male with history of chronic opioid dependence, history of polysubstance IV drug abuse, history of MSSA sepsis with pulmonary valve endocarditis (2015), history of lumbar spine L3 osteomyelitis with epidural abscess (requiring surgical intervention 2017) presented to the ED after an episode of syncope along with cough, fevers chills, noted to be tachycardic in the emergency room had a left-sided scrotal laceration which needed to be sutured in the emergency room.  His blood cultures 2 out of 2 returned positive for gram-positive cocci in clusters in the setting of IV drug use.      CXR on admission noted with small somewhat nodular and patchy opacity at the lateral right base may be explained by infectious/inflammatory pneumonitis      MSSA and MRSA Sepsis with Tricuspid valve endocarditis  Severe TR secondary to above  H/o pulmonary valve endocarditis (2015), history of lumbar spine L3 osteomyelitis with epidural abscess (requiring surgical intervention 2017)  Hyponatremia  - febrile to 101.9 on presentation, has been afebrile since; hypotension improved; IVF d/john 11/18  - evaluated and followed by ID, B Cx growing MSSA and MRSA; continue Vancomycin, pharmacy to dose; discontinued nafcillin (11/18); blood cultures from 11/18 and 11/19 with no growth so far ; will repeat daily cultures; urine Cx from 11/16 with no growth  - completed EDUARDO 11/18 with note of large tricuspid valve vegetation with severe  regurgitation  - evaluated by cardiology and CT surgery: rec to continue medical management for now and follow clinical progress; plan to consider surgical options if clinically worsening  - Na 128---130---140, improved; normal lactic acid 1.8; pro nino 1.03; wbc 13---14---12, ; CXR as above; IVF d/john     History of opiate dependence  History of active IV drug use  Depression  Smoker  - per report last IVDU 1 week PTA; on 11/16 in ED, per nursing he was briefly nonresponsive and apneic, was evaluated by ED provider, narcan was given with improvement in mentation  - has multiple areas of skin excoriations from picking of skin as well as from IV drug use which could be the source of infection as well  - will have CD evaluation when clinically more stable and nearing discharge from the endocarditis perspective  - takes Suboxone only prn and refusing here, will have dilaudid prn for generalized bodyache   - continue PTA Fetzima; prn ativan  - he is on a taper of Soma (listed at 87.5 mg daily, 1/4th of 350 mg tablet but reports was taking bid)-- resumed 11/17; pharmacy assisting in figuring out the prescribing provider to figure out the taper plan  - continue nicotine patch, counselled on smoking and substance use cessation     History of HIV positive status and hepatitis C;  - phil continue PTA Triumeq  - CD4> 200, per ID no need for PJP prophylaxis     Left scrotal laceration secondary to fall and possible syncope;  - Status post suturing on 11/14/2021; needs suture removal in 1 week (around 11/21/21)  - US scrotum noted with small right hydrocele otherwise normal. No evidence for testicular fracture.     DVT prophylaxis; PCD's and ambulation as tolerated     COVID status: asymptomatic screening negative on 11/16    Clinically Significant Risk Factors Present on Admission                Care plan discussed with patient, infectious disease and pharmacy; >35 minutes spent today     Page Me (7 am to 6 pm)               Physical Exam:      Blood pressure (!) 129/93, pulse 77, temperature 98.2  F (36.8  C), resp. rate 15, height 1.829 m (6'), weight 56.2 kg (124 lb), SpO2 98 %.  Vitals:    11/15/21 2141   Weight: 56.2 kg (124 lb)     Vital Signs with Ranges  Temp:  [98.2  F (36.8  C)-98.8  F (37.1  C)] 98.2  F (36.8  C)  Pulse:  [74-98] 77  Resp:  [15-22] 15  BP: (112-137)/() 129/93  SpO2:  [98 %-100 %] 98 %  I/O's Last 24 hours  I/O last 3 completed shifts:  In: 1875 [P.O.:840; I.V.:1035]  Out: 3100 [Urine:3100]    Constitutional: awake and oriented X 3; resting comfortably in no apparent distress   HEENT: Pupils equal and reactive to light and accomodation, neck supple    Oral cavity: Moist mucosa   Cardiovascular: Normal s1 s2, regular rate and rhythm, no murmur   Lungs: B/l clear to auscultation, no wheezes or crepitations   Abdomen: Soft, nt, nd, no guarding, rigidity or rebound; BS +   LE : No edema   Musculoskeletal: Power 5/5 in all extremities   Neuro: No focal neurological deficits noted   Psychiatry: normal mood and affect  Skin : noted multiple skin excoriations and injection marks; sutured scrotal laceration                 Medications:          abacavir-dolutegravir-LamiVUDine  1 tablet Oral Daily     aspirin  81 mg Oral Daily     carisoprodol  87.5 mg Oral BID     levomilnacipran  120 mg Oral Daily     nicotine  1 patch Transdermal QPM     nicotine   Transdermal Q8H     pregabalin  150 mg Oral BID     sodium chloride (PF)  3 mL Intravenous Q8H     vancomycin  1,000 mg Intravenous Q12H     PRN Meds: acetaminophen **OR** acetaminophen, alum & mag hydroxide-simethicone, bisacodyl, calcium carbonate, diazepam, - MEDICATION INSTRUCTIONS -, HOLD MEDICATION, HOLD MEDICATION, HOLD MEDICATION, lidocaine 4%, lidocaine 4%, lidocaine (buffered or not buffered), lidocaine (buffered or not buffered), LORazepam, - MEDICATION INSTRUCTIONS -, melatonin, nitroGLYcerin, ondansetron **OR** ondansetron, oxyCODONE, polyethylene  glycol, prochlorperazine **OR** prochlorperazine **OR** prochlorperazine, sodium chloride (PF), sodium chloride (PF), sodium chloride (PF), sodium chloride (PF)         Data:      All new lab and imaging data was reviewed.   Recent Labs   Lab Test 11/19/21  0557 11/18/21  0535 11/16/21  0014 07/20/21  0128 05/09/21  1450 10/09/17  2149 10/09/17  2015 03/13/15  0900 03/12/15  1620   WBC 9.5 12.9* 14.7*   < > 5.5   < > KUMAR UER 2115 BY M[facility]   < > 11.8*   HGB 12.2* 11.0* 11.4*   < > 13.3   < > KUMAR UER 2115 BY [facility]   < > 10.5*   MCV 83 84 84   < > 87   < > KUMAR UER 2115 BY [facility]   < > 81    270 187   < > 354   < > KUMAR UER 2115 BY [facility]   < > 515*   INR  --   --   --   --  0.96  --  1.01  --  1.92*    < > = values in this interval not displayed.      Recent Labs   Lab Test 11/19/21 0557 11/18/21  0535 11/16/21 2049 11/16/21  0014    140  --  130*   POTASSIUM 3.4 3.5  --  3.5   CHLORIDE 110* 108  --  97   CO2 27 29  --  29   BUN 7 5*  --  11   CR 0.76 0.81  --  0.71   ANIONGAP 6 3  --  4   SANDRA 8.5 8.5  --  8.5   * 106* 94 104*     Recent Labs   Lab Test 05/09/21  1450 10/09/17  2015   TROPI <0.015 <0.015         Principal Discharge DX:	Abscess

## 2021-12-29 NOTE — H&P PST ADULT - NS MD HP HEP C STATUS
Prep Survey      Responses   [facility] facility patient discharged from? [place]   Is LACE score < 7 ? No   Emergency Room discharge w/ pulse ox? No   Eligibility Readm Mgmt   Discharge diagnosis Laparoscopic placement of new peritoneal dialysis catheter   Does the patient have one of the following disease processes/diagnoses(primary or secondary)? General Surgery   Does the patient have Home health ordered? No   Is there a DME ordered? No   Prep survey completed? Yes          Dyan Timmons RN        
Negative

## 2022-04-12 ENCOUNTER — OUTPATIENT (OUTPATIENT)
Dept: OUTPATIENT SERVICES | Facility: HOSPITAL | Age: 67
LOS: 1 days | End: 2022-04-12
Payer: COMMERCIAL

## 2022-04-12 ENCOUNTER — APPOINTMENT (OUTPATIENT)
Dept: MRI IMAGING | Facility: CLINIC | Age: 67
End: 2022-04-12
Payer: COMMERCIAL

## 2022-04-12 DIAGNOSIS — Z98.891 HISTORY OF UTERINE SCAR FROM PREVIOUS SURGERY: Chronic | ICD-10-CM

## 2022-04-12 DIAGNOSIS — Z00.8 ENCOUNTER FOR OTHER GENERAL EXAMINATION: ICD-10-CM

## 2022-04-12 DIAGNOSIS — R42 DIZZINESS AND GIDDINESS: ICD-10-CM

## 2022-04-12 DIAGNOSIS — Z98.890 OTHER SPECIFIED POSTPROCEDURAL STATES: Chronic | ICD-10-CM

## 2022-04-12 DIAGNOSIS — R26.81 UNSTEADINESS ON FEET: ICD-10-CM

## 2022-04-12 PROCEDURE — 70551 MRI BRAIN STEM W/O DYE: CPT

## 2022-04-12 PROCEDURE — 70551 MRI BRAIN STEM W/O DYE: CPT | Mod: 26

## 2022-05-03 ENCOUNTER — APPOINTMENT (OUTPATIENT)
Dept: MRI IMAGING | Facility: CLINIC | Age: 67
End: 2022-05-03

## 2022-05-17 ENCOUNTER — OUTPATIENT (OUTPATIENT)
Dept: OUTPATIENT SERVICES | Facility: HOSPITAL | Age: 67
LOS: 1 days | End: 2022-05-17
Payer: COMMERCIAL

## 2022-05-17 ENCOUNTER — APPOINTMENT (OUTPATIENT)
Dept: MRI IMAGING | Facility: HOSPITAL | Age: 67
End: 2022-05-17
Payer: COMMERCIAL

## 2022-05-17 DIAGNOSIS — Z98.890 OTHER SPECIFIED POSTPROCEDURAL STATES: Chronic | ICD-10-CM

## 2022-05-17 DIAGNOSIS — D35.2 BENIGN NEOPLASM OF PITUITARY GLAND: ICD-10-CM

## 2022-05-17 DIAGNOSIS — Z98.891 HISTORY OF UTERINE SCAR FROM PREVIOUS SURGERY: Chronic | ICD-10-CM

## 2022-05-17 PROCEDURE — A9579: CPT

## 2022-05-17 PROCEDURE — 70553 MRI BRAIN STEM W/O & W/DYE: CPT | Mod: 26

## 2022-05-17 PROCEDURE — 70553 MRI BRAIN STEM W/O & W/DYE: CPT

## 2022-06-01 ENCOUNTER — APPOINTMENT (OUTPATIENT)
Dept: OPHTHALMOLOGY | Facility: CLINIC | Age: 67
End: 2022-06-01
Payer: COMMERCIAL

## 2022-06-01 ENCOUNTER — NON-APPOINTMENT (OUTPATIENT)
Age: 67
End: 2022-06-01

## 2022-06-01 ENCOUNTER — APPOINTMENT (OUTPATIENT)
Dept: SPINE | Facility: CLINIC | Age: 67
End: 2022-06-01
Payer: COMMERCIAL

## 2022-06-01 VITALS
DIASTOLIC BLOOD PRESSURE: 90 MMHG | HEART RATE: 74 BPM | OXYGEN SATURATION: 98 % | WEIGHT: 140 LBS | HEIGHT: 61 IN | BODY MASS INDEX: 26.43 KG/M2 | SYSTOLIC BLOOD PRESSURE: 135 MMHG

## 2022-06-01 PROCEDURE — 99204 OFFICE O/P NEW MOD 45 MIN: CPT

## 2022-06-01 PROCEDURE — 92004 COMPRE OPH EXAM NEW PT 1/>: CPT

## 2022-06-01 PROCEDURE — 92133 CPTRZD OPH DX IMG PST SGM ON: CPT

## 2022-06-01 RX ORDER — GABAPENTIN 100 MG/1
100 CAPSULE ORAL
Qty: 120 | Refills: 1 | Status: DISCONTINUED | COMMUNITY
Start: 2018-05-30 | End: 2022-06-01

## 2022-06-01 RX ORDER — HYLAN G-F 20 16MG/2ML
16 SYRINGE (ML) INTRAARTICULAR
Qty: 1 | Refills: 0 | Status: DISCONTINUED | OUTPATIENT
Start: 2020-05-08 | End: 2022-06-01

## 2022-06-01 RX ORDER — HYDROCHLOROTHIAZIDE 12.5 MG/1
12.5 TABLET ORAL
Qty: 30 | Refills: 0 | Status: DISCONTINUED | COMMUNITY
Start: 2018-05-22 | End: 2022-06-01

## 2022-06-01 RX ORDER — OXYCODONE 10 MG/1
10 TABLET ORAL 4 TIMES DAILY
Qty: 28 | Refills: 0 | Status: DISCONTINUED | COMMUNITY
Start: 2018-08-03 | End: 2022-06-01

## 2022-06-01 RX ORDER — IBUPROFEN 600 MG/1
600 TABLET, FILM COATED ORAL 3 TIMES DAILY
Qty: 90 | Refills: 0 | Status: DISCONTINUED | COMMUNITY
Start: 2018-05-22 | End: 2022-06-01

## 2022-06-01 RX ORDER — AMOXICILLIN AND CLAVULANATE POTASSIUM 875; 125 MG/1; MG/1
875-125 TABLET, COATED ORAL
Qty: 14 | Refills: 0 | Status: DISCONTINUED | COMMUNITY
Start: 2018-08-03 | End: 2022-06-01

## 2022-06-01 RX ORDER — HYALURONATE SODIUM 20 MG/2 ML
20 SYRINGE (ML) INTRAARTICULAR
Qty: 1 | Refills: 0 | Status: DISCONTINUED | OUTPATIENT
Start: 2020-05-12 | End: 2022-06-01

## 2022-07-12 NOTE — ED PROVIDER NOTE - NS_ATTENDINGSCRIBE_ED_ALL_ED
Home DME needs new Shower Chair order .  Can't use the order from 12/03/2021  Order pended for you I personally performed the service described in the documentation recorded by the scribe in my presence, and it accurately and completely records my words and actions.

## 2022-10-04 NOTE — H&P ADULT - ATTENDING COMMENTS
None
MRI pending, eval for abscess.  IR consult for drainage pending MRI.  Seen by neurosurgery, no immediate plans for surgery.   Observe off abx and f/u cultures.

## 2022-12-14 ENCOUNTER — APPOINTMENT (OUTPATIENT)
Dept: SPINE | Facility: CLINIC | Age: 67
End: 2022-12-14

## 2022-12-14 PROCEDURE — 99213 OFFICE O/P EST LOW 20 MIN: CPT

## 2023-01-11 NOTE — PROGRESS NOTE ADULT - PROBLEM SELECTOR PLAN 3
-c/w percoset
Detail Level: Zone
Samples Given: Reviewed ways to disperse pressure — avoid lying on area. Recommend Vaseline and a band-aid

## 2023-02-15 ENCOUNTER — APPOINTMENT (OUTPATIENT)
Dept: SPINE | Facility: CLINIC | Age: 68
End: 2023-02-15

## 2023-03-07 ENCOUNTER — NON-APPOINTMENT (OUTPATIENT)
Age: 68
End: 2023-03-07

## 2023-03-08 ENCOUNTER — APPOINTMENT (OUTPATIENT)
Dept: SPINE | Facility: CLINIC | Age: 68
End: 2023-03-08
Payer: COMMERCIAL

## 2023-03-08 ENCOUNTER — APPOINTMENT (OUTPATIENT)
Dept: SPINE | Facility: CLINIC | Age: 68
End: 2023-03-08

## 2023-03-08 VITALS
DIASTOLIC BLOOD PRESSURE: 75 MMHG | SYSTOLIC BLOOD PRESSURE: 125 MMHG | OXYGEN SATURATION: 98 % | WEIGHT: 118 LBS | BODY MASS INDEX: 22.28 KG/M2 | HEART RATE: 75 BPM | HEIGHT: 61 IN

## 2023-03-08 PROCEDURE — 99213 OFFICE O/P EST LOW 20 MIN: CPT

## 2023-03-08 RX ORDER — LEVOTHYROXINE SODIUM 0.03 MG/1
25 TABLET ORAL
Refills: 0 | Status: ACTIVE | COMMUNITY

## 2023-03-17 ENCOUNTER — APPOINTMENT (OUTPATIENT)
Dept: MRI IMAGING | Facility: CLINIC | Age: 68
End: 2023-03-17

## 2023-03-17 ENCOUNTER — OUTPATIENT (OUTPATIENT)
Dept: OUTPATIENT SERVICES | Facility: HOSPITAL | Age: 68
LOS: 1 days | End: 2023-03-17

## 2023-03-17 ENCOUNTER — APPOINTMENT (OUTPATIENT)
Dept: CT IMAGING | Facility: CLINIC | Age: 68
End: 2023-03-17

## 2023-03-17 DIAGNOSIS — Z00.8 ENCOUNTER FOR OTHER GENERAL EXAMINATION: ICD-10-CM

## 2023-03-17 DIAGNOSIS — Z98.890 OTHER SPECIFIED POSTPROCEDURAL STATES: Chronic | ICD-10-CM

## 2023-03-17 DIAGNOSIS — Z98.891 HISTORY OF UTERINE SCAR FROM PREVIOUS SURGERY: Chronic | ICD-10-CM

## 2023-03-24 ENCOUNTER — NON-APPOINTMENT (OUTPATIENT)
Age: 68
End: 2023-03-24

## 2023-03-24 ENCOUNTER — APPOINTMENT (OUTPATIENT)
Dept: OPHTHALMOLOGY | Facility: CLINIC | Age: 68
End: 2023-03-24
Payer: COMMERCIAL

## 2023-03-24 PROCEDURE — 92012 INTRM OPH EXAM EST PATIENT: CPT

## 2023-03-27 ENCOUNTER — APPOINTMENT (OUTPATIENT)
Dept: OTOLARYNGOLOGY | Facility: CLINIC | Age: 68
End: 2023-03-27
Payer: COMMERCIAL

## 2023-03-27 VITALS
HEIGHT: 61 IN | DIASTOLIC BLOOD PRESSURE: 81 MMHG | BODY MASS INDEX: 55.32 KG/M2 | WEIGHT: 293 LBS | HEART RATE: 76 BPM | SYSTOLIC BLOOD PRESSURE: 122 MMHG

## 2023-03-27 PROCEDURE — 99204 OFFICE O/P NEW MOD 45 MIN: CPT | Mod: 25

## 2023-03-27 PROCEDURE — 31231 NASAL ENDOSCOPY DX: CPT

## 2023-03-27 NOTE — END OF VISIT
[FreeTextEntry3] : I personally saw and examined DEV KNIGHT in detail.  I spoke to ZECHARIAH Lauren regarding the assessment and plan of care. I performed the procedures and relevant physical exam.  I have reviewed the above assessment and plan of care and I agree.  I have made changes to the body of the note wherever necessary and appropriate.\par

## 2023-03-27 NOTE — PHYSICAL EXAM
[Nasal Endoscopy Performed] : nasal endoscopy was performed, see procedure section for findings [de-identified] : septal perforation [de-identified] : extensive crusting [Normal] : mucosa is normal [Midline] : trachea located in midline position

## 2023-03-27 NOTE — ASSESSMENT
[FreeTextEntry1] : scheduled for TSRP with Dr. Rust and Dr. Hernadez 5/4/23\par - Augmentin twice daily x 10 days\par - start sinus rinse twice daily with added Budesonide\par - f/u with Dr. Hernadez 1 week prior to surgery to recheck sinuses\par - will add FESS to surgery booking\par - discussed my role in the surgery including the nasal and sinus procedures that I will be performing in order to provide the neurosurgeon access to the pituitary tumor\par - expected post-op hospital course discussed including need for possible repair of spinal fluid leak and close observation for 2-3 days\par - post-op care consisting of nasal saline irrigations was reviewed, and [facility] sinus pamphlet was given to patient to obtain prior to surgery so they have ready to use on discharge home\par - discussed post-operative issues including nasal congestion, loss of sense of smell which should be temporary but can be permanent, bloody nasal drainage, facial pressure/headaches, bleeding, and infection\par - all questions answered and patient will call if any further concerns\par - plan to see patient on AM of surgery

## 2023-03-27 NOTE — HISTORY OF PRESENT ILLNESS
[de-identified] : Ms. KNIGHT is a 67 year female with pituitary tumor.\par - h/o sinus surgery and known septal perforation. Chronic sinus/nasal issues.\par - scheduled for TSRP with Dr. Rust and Dr. Hernadez 5/4/23

## 2023-03-27 NOTE — PROCEDURE
[FreeTextEntry6] : Flexible scope #210 was used. Right nasal passage with partially absent inferior, middle and superior turbinates. large anterior septal perforation with significant crusting. Scarred choana and significant mucosa swelling. + diffuse mucopurulence.  Left nasal passage with partially absent inferior, middle and superior turbinates. large anterior septal perforation with significant crusting. Scarred choana and significant mucosa swelling. + diffuse mucopurulence.  Nasopharynx not visualized.\par

## 2023-04-12 ENCOUNTER — NON-APPOINTMENT (OUTPATIENT)
Age: 68
End: 2023-04-12

## 2023-04-13 ENCOUNTER — NON-APPOINTMENT (OUTPATIENT)
Age: 68
End: 2023-04-13

## 2023-04-19 ENCOUNTER — OUTPATIENT (OUTPATIENT)
Dept: OUTPATIENT SERVICES | Facility: HOSPITAL | Age: 68
LOS: 1 days | End: 2023-04-19
Payer: COMMERCIAL

## 2023-04-19 VITALS
SYSTOLIC BLOOD PRESSURE: 117 MMHG | HEIGHT: 63 IN | RESPIRATION RATE: 16 BRPM | DIASTOLIC BLOOD PRESSURE: 70 MMHG | WEIGHT: 134.92 LBS | HEART RATE: 90 BPM | OXYGEN SATURATION: 99 % | TEMPERATURE: 99 F

## 2023-04-19 DIAGNOSIS — Z86.59 PERSONAL HISTORY OF OTHER MENTAL AND BEHAVIORAL DISORDERS: ICD-10-CM

## 2023-04-19 DIAGNOSIS — Z98.890 OTHER SPECIFIED POSTPROCEDURAL STATES: Chronic | ICD-10-CM

## 2023-04-19 DIAGNOSIS — E23.6 OTHER DISORDERS OF PITUITARY GLAND: ICD-10-CM

## 2023-04-19 DIAGNOSIS — Z98.891 HISTORY OF UTERINE SCAR FROM PREVIOUS SURGERY: Chronic | ICD-10-CM

## 2023-04-19 DIAGNOSIS — D35.2 BENIGN NEOPLASM OF PITUITARY GLAND: ICD-10-CM

## 2023-04-19 DIAGNOSIS — Z01.818 ENCOUNTER FOR OTHER PREPROCEDURAL EXAMINATION: ICD-10-CM

## 2023-04-19 LAB
ANION GAP SERPL CALC-SCNC: 14 MMOL/L — SIGNIFICANT CHANGE UP (ref 5–17)
BLD GP AB SCN SERPL QL: NEGATIVE — SIGNIFICANT CHANGE UP
BUN SERPL-MCNC: 22 MG/DL — SIGNIFICANT CHANGE UP (ref 7–23)
CALCIUM SERPL-MCNC: 9.2 MG/DL — SIGNIFICANT CHANGE UP (ref 8.4–10.5)
CHLORIDE SERPL-SCNC: 108 MMOL/L — SIGNIFICANT CHANGE UP (ref 96–108)
CO2 SERPL-SCNC: 21 MMOL/L — LOW (ref 22–31)
CREAT SERPL-MCNC: 0.88 MG/DL — SIGNIFICANT CHANGE UP (ref 0.5–1.3)
EGFR: 72 ML/MIN/1.73M2 — SIGNIFICANT CHANGE UP
GLUCOSE SERPL-MCNC: 118 MG/DL — HIGH (ref 70–99)
HCT VFR BLD CALC: 32.8 % — LOW (ref 34.5–45)
HGB BLD-MCNC: 10.7 G/DL — LOW (ref 11.5–15.5)
MCHC RBC-ENTMCNC: 31.9 PG — SIGNIFICANT CHANGE UP (ref 27–34)
MCHC RBC-ENTMCNC: 32.6 GM/DL — SIGNIFICANT CHANGE UP (ref 32–36)
MCV RBC AUTO: 97.9 FL — SIGNIFICANT CHANGE UP (ref 80–100)
NRBC # BLD: 0 /100 WBCS — SIGNIFICANT CHANGE UP (ref 0–0)
PLATELET # BLD AUTO: 482 K/UL — HIGH (ref 150–400)
POTASSIUM SERPL-MCNC: 4.2 MMOL/L — SIGNIFICANT CHANGE UP (ref 3.5–5.3)
POTASSIUM SERPL-SCNC: 4.2 MMOL/L — SIGNIFICANT CHANGE UP (ref 3.5–5.3)
RBC # BLD: 3.35 M/UL — LOW (ref 3.8–5.2)
RBC # FLD: 16.3 % — HIGH (ref 10.3–14.5)
RH IG SCN BLD-IMP: POSITIVE — SIGNIFICANT CHANGE UP
SODIUM SERPL-SCNC: 143 MMOL/L — SIGNIFICANT CHANGE UP (ref 135–145)
WBC # BLD: 13.82 K/UL — HIGH (ref 3.8–10.5)
WBC # FLD AUTO: 13.82 K/UL — HIGH (ref 3.8–10.5)

## 2023-04-19 PROCEDURE — 86900 BLOOD TYPING SEROLOGIC ABO: CPT

## 2023-04-19 PROCEDURE — 80048 BASIC METABOLIC PNL TOTAL CA: CPT

## 2023-04-19 PROCEDURE — 85027 COMPLETE CBC AUTOMATED: CPT

## 2023-04-19 PROCEDURE — G0463: CPT

## 2023-04-19 PROCEDURE — 86850 RBC ANTIBODY SCREEN: CPT

## 2023-04-19 PROCEDURE — 86901 BLOOD TYPING SEROLOGIC RH(D): CPT

## 2023-04-19 RX ORDER — ASCORBIC ACID 60 MG
1 TABLET,CHEWABLE ORAL
Qty: 0 | Refills: 0 | DISCHARGE

## 2023-04-19 RX ORDER — MILK THISTLE 180 MG
1 CAPSULE ORAL
Qty: 0 | Refills: 0 | DISCHARGE

## 2023-04-19 RX ORDER — PREGABALIN 225 MG/1
1 CAPSULE ORAL
Qty: 0 | Refills: 0 | DISCHARGE

## 2023-04-19 RX ORDER — IBUPROFEN 200 MG
1 TABLET ORAL
Qty: 0 | Refills: 0 | DISCHARGE

## 2023-04-19 RX ORDER — CHOLECALCIFEROL (VITAMIN D3) 125 MCG
1 CAPSULE ORAL
Qty: 0 | Refills: 0 | DISCHARGE

## 2023-04-19 RX ORDER — VITAMIN E 100 UNIT
1 CAPSULE ORAL
Qty: 0 | Refills: 0 | DISCHARGE

## 2023-04-19 RX ORDER — CHLORHEXIDINE GLUCONATE 213 G/1000ML
1 SOLUTION TOPICAL ONCE
Refills: 0 | Status: DISCONTINUED | OUTPATIENT
Start: 2023-05-04 | End: 2023-05-05

## 2023-04-19 RX ORDER — SODIUM CHLORIDE 9 MG/ML
3 INJECTION INTRAMUSCULAR; INTRAVENOUS; SUBCUTANEOUS EVERY 8 HOURS
Refills: 0 | Status: DISCONTINUED | OUTPATIENT
Start: 2023-05-04 | End: 2023-05-05

## 2023-04-19 RX ORDER — NIACIN 50 MG
1 TABLET ORAL
Qty: 0 | Refills: 0 | DISCHARGE

## 2023-04-19 RX ORDER — OMEGA-3 ACID ETHYL ESTERS 1 G
1 CAPSULE ORAL
Qty: 0 | Refills: 0 | DISCHARGE

## 2023-04-19 NOTE — H&P PST ADULT - NSICDXPASTMEDICALHX_GEN_ALL_CORE_FT
PAST MEDICAL HISTORY:  Anemia     Cervical spinal stenosis     Fibromyalgia     Herniated disc, cervical unsure levels    Lumbar herniated disc unsure levels    Lumbar spinal stenosis     Mass of arm, left     Mass on back left upper    Obesity, Class I, BMI 30-34.9     Osteoarthritis     Pollen allergy     Seasonal allergies     Stuffy nose     Tendonitis right shoulder    Thoracic spinal stenosis     Urinary frequency PAST MEDICAL HISTORY:  Anemia     H/O chronic sinusitis     H/O psychosis     Hypothyroid     Lumbar herniated disc unsure levels    Lumbar spinal stenosis     Mass of arm, left     Mass on back left upper    Nasal septal perforation     Obesity, Class I, BMI 30-34.9     Osteoarthritis     Pituitary mass     Tendonitis right shoulder    Urinary frequency

## 2023-04-19 NOTE — H&P PST ADULT - PSYCHIATRIC COMMENTS
inappropriately alternating between crying and laughing. Rambling conversations and needed to be redirected frequently to answers questions. Agitated over being in the hospital and stated that she will never see a doctor again after this surgery is completed. During health maintainence questioning became agitated that she was being told to have testing done (ie. mammo and PAP) did not want any "of that testing done". Pt. constantly getting annoyed at being asked questions during H&P. Pt. allowed to verbalize and explained everything that was being done.

## 2023-04-19 NOTE — H&P PST ADULT - PROBLEM SELECTOR PLAN 1
Endoscopic Transphenoidal Removal of Pituitary Tumor, Functional Endoscopic Sinus Surgery with Image Guidance on 5/4/23.   Pre-op instructions provided - all questions answered  Labs done at PST  M/E with PCP on 4/26

## 2023-04-19 NOTE — H&P PST ADULT - PSY GEN HX ROS MEA POS PC
depression/anxiety controlled with psych medication/depression/anxiety/mood swings/visual hallucinations

## 2023-04-19 NOTE — H&P PST ADULT - HISTORY OF PRESENT ILLNESS
66 yo female, PMH psychiatric disorders with psychosis and hallucination f/u with psychologist sinus surgery, septal perforation, chronic sinusitis, pituitary tumor since             . Pt. presents to PST for scheduled Endoscopic  visual disturbances - over a year ago, shadows,   cyst from arm and back  COVID-19 infection 66 yo female, PMH hypothyroid, psychiatric disorders with psychosis and hallucination f/u with psychologist and stable on medication,  sinus surgery, septal perforation, chronic sinusitis, lumbar spine stenosis s/p lumbar laminectomy. Pt. reports that she has been losing her vision due to a  pituitary tumor discovered over a year ago. Pt. presents to PST for scheduled Endoscopic Transphenoidal Removal of Pituitary Tumor, Functional endoscopic Sinus Surgery with Image Guidance on 5/4/23. Denies recent fever, chills, chest pain, SOB, changes in bowel/urinary habits or recent exposure to COVID-19 - pt. states she never had COVID infection    Pt. will have pre-op medical clearance on 4/27  Appointment with Dr. Hernadez, ENT on 5/1  Brief neurosurgery note stating that they have called Dr. Clark and that she is stable on psych medication (placed in chart)

## 2023-04-19 NOTE — H&P PST ADULT - ASSESSMENT
Activity: PT once or twice a week, does situps and leg lifts at home    DASI scale:    Mallampati:    Dentition: Has not teeth, does not wear dentures Activity: PT once or twice a week, does daily sit-ups and leg lifts at home    DASI scale: 4.40 - decreased due to partial blindness    Mallampati: 3    Dentition: Has not teeth, does not wear dentures

## 2023-04-19 NOTE — H&P PST ADULT - NSICDXFAMILYHX_GEN_ALL_CORE_FT
FAMILY HISTORY:  Father  Still living? No  Family history of arrhythmia, Age at diagnosis: Age Unknown  Family history of gout, Age at diagnosis: Age Unknown  FHx: coronary artery disease, Age at diagnosis: Age Unknown    Mother  Still living? No  Family history of osteoarthritis, Age at diagnosis: Age Unknown  FHx: coronary artery disease, Age at diagnosis: Age Unknown    Child  Still living? No  Family history of DVT, Age at diagnosis: Age Unknown  FHx: coronary artery disease, Age at diagnosis: Age Unknown FAMILY HISTORY:  Father  Still living? No  Family history of arrhythmia, Age at diagnosis: Age Unknown  Family history of gout, Age at diagnosis: Age Unknown  FHx: coronary artery disease, Age at diagnosis: Age Unknown    Mother  Still living? No  Family history of osteoarthritis, Age at diagnosis: Age Unknown  FHx: coronary artery disease, Age at diagnosis: Age Unknown    Child  Still living? No  Family history of DVT, Age at diagnosis: Age Unknown  FHx: coronary artery disease, Age at diagnosis: Age Unknown

## 2023-04-19 NOTE — H&P PST ADULT - NSICDXPASTSURGICALHX_GEN_ALL_CORE_FT
PAST SURGICAL HISTORY:  H/O  section x 2 refused to say when    H/O excision of mass unsure what part of body or when surgery was done    History of lumbar laminectomy 2017 L4-5, L5-S1 PAST SURGICAL HISTORY:  H/O  section x 2 refused to say when    H/O excision of mass unsure what part of body or when surgery was done    History of bunionectomy right foot    History of lumbar laminectomy 2017 L4-5, L5-S1

## 2023-04-19 NOTE — H&P PST ADULT - PATIENT ON (OXYGEN DELIVERY METHOD)
Pt was at work and lifting patient into bed and "pulled" left shoulder 2 days ago. Pt c/o intermittent pain to left shoulder and neck. room air

## 2023-04-19 NOTE — H&P PST ADULT - OTHER CARE PROVIDERS
Dr. Clark, psychiatrist, [phone]; Dr. Hernadez, ENT, [phone]; (appnt 5/1)  Dr. YOLIS Land, ophthalmology, [phone] Dr. Clark, psychiatrist, [phone]; Dr. Hernadez, ENT, [phone]; (appt 5/1)  Dr. YOLIS Land, ophthalmology, [phone]; Dr. Blanco, endocrinology,  [phone]

## 2023-04-19 NOTE — H&P PST ADULT - NEGATIVE NEUROLOGICAL SYMPTOMS
no weakness/no syncope/no vertigo/no difficulty walking/no headache no weakness/no generalized seizures/no syncope/no tremors/no vertigo/no difficulty walking/no headache

## 2023-04-19 NOTE — H&P PST ADULT - NSANTHOSAYNRD_GEN_A_CORE
No. BRAYDEN screening performed.  STOP BANG Legend: 0-2 = LOW Risk; 3-4 = INTERMEDIATE Risk; 5-8 = HIGH Risk

## 2023-04-21 PROBLEM — J34.89 OTHER SPECIFIED DISORDERS OF NOSE AND NASAL SINUSES: Chronic | Status: ACTIVE | Noted: 2023-04-19

## 2023-04-21 PROBLEM — Z87.09 PERSONAL HISTORY OF OTHER DISEASES OF THE RESPIRATORY SYSTEM: Chronic | Status: ACTIVE | Noted: 2023-04-19

## 2023-04-21 PROBLEM — E23.6 OTHER DISORDERS OF PITUITARY GLAND: Chronic | Status: ACTIVE | Noted: 2023-04-19

## 2023-04-21 PROBLEM — E03.9 HYPOTHYROIDISM, UNSPECIFIED: Chronic | Status: ACTIVE | Noted: 2023-04-19

## 2023-04-21 PROBLEM — Z86.59 PERSONAL HISTORY OF OTHER MENTAL AND BEHAVIORAL DISORDERS: Chronic | Status: ACTIVE | Noted: 2023-04-19

## 2023-04-25 ENCOUNTER — NON-APPOINTMENT (OUTPATIENT)
Age: 68
End: 2023-04-25

## 2023-04-26 ENCOUNTER — APPOINTMENT (OUTPATIENT)
Dept: MRI IMAGING | Facility: IMAGING CENTER | Age: 68
End: 2023-04-26
Payer: COMMERCIAL

## 2023-04-26 ENCOUNTER — APPOINTMENT (OUTPATIENT)
Dept: CT IMAGING | Facility: IMAGING CENTER | Age: 68
End: 2023-04-26
Payer: COMMERCIAL

## 2023-04-26 ENCOUNTER — OUTPATIENT (OUTPATIENT)
Dept: OUTPATIENT SERVICES | Facility: HOSPITAL | Age: 68
LOS: 1 days | End: 2023-04-26
Payer: COMMERCIAL

## 2023-04-26 DIAGNOSIS — J32.4 CHRONIC PANSINUSITIS: ICD-10-CM

## 2023-04-26 DIAGNOSIS — Z98.890 OTHER SPECIFIED POSTPROCEDURAL STATES: Chronic | ICD-10-CM

## 2023-04-26 DIAGNOSIS — Z98.891 HISTORY OF UTERINE SCAR FROM PREVIOUS SURGERY: Chronic | ICD-10-CM

## 2023-04-26 PROCEDURE — 70553 MRI BRAIN STEM W/O & W/DYE: CPT

## 2023-04-26 PROCEDURE — 70486 CT MAXILLOFACIAL W/O DYE: CPT | Mod: 26

## 2023-04-26 PROCEDURE — 70553 MRI BRAIN STEM W/O & W/DYE: CPT | Mod: 26

## 2023-04-26 PROCEDURE — 70450 CT HEAD/BRAIN W/O DYE: CPT | Mod: 26

## 2023-04-26 PROCEDURE — A9585: CPT

## 2023-04-26 PROCEDURE — 70450 CT HEAD/BRAIN W/O DYE: CPT

## 2023-04-26 PROCEDURE — 70486 CT MAXILLOFACIAL W/O DYE: CPT

## 2023-04-28 ENCOUNTER — NON-APPOINTMENT (OUTPATIENT)
Age: 68
End: 2023-04-28

## 2023-05-01 ENCOUNTER — NON-APPOINTMENT (OUTPATIENT)
Age: 68
End: 2023-05-01

## 2023-05-01 ENCOUNTER — APPOINTMENT (OUTPATIENT)
Dept: OTOLARYNGOLOGY | Facility: CLINIC | Age: 68
End: 2023-05-01
Payer: COMMERCIAL

## 2023-05-01 VITALS
DIASTOLIC BLOOD PRESSURE: 65 MMHG | SYSTOLIC BLOOD PRESSURE: 99 MMHG | HEART RATE: 94 BPM | WEIGHT: 125 LBS | BODY MASS INDEX: 23.6 KG/M2 | HEIGHT: 61 IN

## 2023-05-01 PROCEDURE — 99213 OFFICE O/P EST LOW 20 MIN: CPT | Mod: 25

## 2023-05-01 PROCEDURE — 31231 NASAL ENDOSCOPY DX: CPT

## 2023-05-01 RX ORDER — AMOXICILLIN AND CLAVULANATE POTASSIUM 875; 125 MG/1; MG/1
875-125 TABLET, COATED ORAL
Qty: 20 | Refills: 0 | Status: COMPLETED | COMMUNITY
Start: 2023-03-27 | End: 2023-05-01

## 2023-05-01 NOTE — ASSESSMENT
[FreeTextEntry1] : Pituitary adenoma, with plan for surgery Thursday, underlying septal perforation and chronic sinusitis:\par – The acute component of her infection appears to be resolved after completing the Augmentin\par – She is not really compliant with the saline and budesonide irrigations, however we will debride her at the time of surgery\par – Discussed need for sinus surgery at the time of the transsphenoidal approach in order to access the sinuses and also expected postoperative care\par – We will follow-up for surgery on Thursday, all questions answered from  and patient

## 2023-05-01 NOTE — PROCEDURE
[FreeTextEntry6] : Flexible scope #210 was used. Right nasal passage with partially absent inferior, middle and superior turbinates. large anterior septal perforation with significant crusting. Scarred choana and significant mucosa swelling. Left nasal passage with partially absent inferior, middle and superior turbinates. large anterior septal perforation with significant crusting. Scarred choana and significant mucosa swelling. no mucopurulence. Nasopharynx not visualized.

## 2023-05-01 NOTE — CONSULT LETTER
[Dear  ___] : Dear  [unfilled], [Consult Letter:] : I had the pleasure of evaluating your patient, [unfilled]. [Please see my note below.] : Please see my note below. [Consult Closing:] : Thank you very much for allowing me to participate in the care of this patient.  If you have any questions, please do not hesitate to contact me. [Sincerely,] : Sincerely, [DrQue  ___] : Dr. CUEVA [FreeTextEntry2] : Dr. Zane Marcum [FreeTextEntry3] : Leonora Hernadez MD\par Otolaryngology and Cranial Base Surgery\par Attending Physician - Department of Otolaryngology and Head & Neck Surgery\par [facility]\par \par [employer] [facility]

## 2023-05-01 NOTE — HISTORY OF PRESENT ILLNESS
[de-identified] : 67 year female with pituitary tumor. History of sinus surgery and known septal perforation. Scheduled for TSRP with Dr. Rust and Dr. Hernadez 5/4/23. Here today for pre operative evaluation. Using saline rinses about 2x a week.  notes she is much better as he notes she is doing a lot less nose blowing.

## 2023-05-03 ENCOUNTER — TRANSCRIPTION ENCOUNTER (OUTPATIENT)
Age: 68
End: 2023-05-03

## 2023-05-04 ENCOUNTER — INPATIENT (INPATIENT)
Facility: HOSPITAL | Age: 68
LOS: 1 days | Discharge: HOME | DRG: 614 | End: 2023-05-06
Attending: NEUROLOGICAL SURGERY | Admitting: NEUROLOGICAL SURGERY
Payer: COMMERCIAL

## 2023-05-04 ENCOUNTER — RESULT REVIEW (OUTPATIENT)
Age: 68
End: 2023-05-04

## 2023-05-04 ENCOUNTER — APPOINTMENT (OUTPATIENT)
Dept: OTOLARYNGOLOGY | Facility: HOSPITAL | Age: 68
End: 2023-05-04

## 2023-05-04 ENCOUNTER — APPOINTMENT (OUTPATIENT)
Dept: SPINE | Facility: HOSPITAL | Age: 68
End: 2023-05-04

## 2023-05-04 VITALS
OXYGEN SATURATION: 98 % | WEIGHT: 134.92 LBS | HEIGHT: 62.99 IN | DIASTOLIC BLOOD PRESSURE: 73 MMHG | SYSTOLIC BLOOD PRESSURE: 124 MMHG | TEMPERATURE: 98 F | RESPIRATION RATE: 18 BRPM | HEART RATE: 73 BPM

## 2023-05-04 DIAGNOSIS — Z98.890 OTHER SPECIFIED POSTPROCEDURAL STATES: Chronic | ICD-10-CM

## 2023-05-04 DIAGNOSIS — E03.8 OTHER SPECIFIED HYPOTHYROIDISM: ICD-10-CM

## 2023-05-04 DIAGNOSIS — E27.49 OTHER ADRENOCORTICAL INSUFFICIENCY: ICD-10-CM

## 2023-05-04 DIAGNOSIS — E23.6 OTHER DISORDERS OF PITUITARY GLAND: ICD-10-CM

## 2023-05-04 DIAGNOSIS — Z98.891 HISTORY OF UTERINE SCAR FROM PREVIOUS SURGERY: Chronic | ICD-10-CM

## 2023-05-04 DIAGNOSIS — D35.2 BENIGN NEOPLASM OF PITUITARY GLAND: ICD-10-CM

## 2023-05-04 LAB
ANION GAP SERPL CALC-SCNC: 18 MMOL/L — HIGH (ref 5–17)
BUN SERPL-MCNC: 31 MG/DL — HIGH (ref 7–23)
CALCIUM SERPL-MCNC: 8.7 MG/DL — SIGNIFICANT CHANGE UP (ref 8.4–10.5)
CHLORIDE SERPL-SCNC: 106 MMOL/L — SIGNIFICANT CHANGE UP (ref 96–108)
CO2 SERPL-SCNC: 20 MMOL/L — LOW (ref 22–31)
CREAT SERPL-MCNC: 1.41 MG/DL — HIGH (ref 0.5–1.3)
EGFR: 41 ML/MIN/1.73M2 — LOW
GAS PNL BLDA: SIGNIFICANT CHANGE UP
GLUCOSE SERPL-MCNC: 173 MG/DL — HIGH (ref 70–99)
GRAM STN FLD: SIGNIFICANT CHANGE UP
HCT VFR BLD CALC: 32.3 % — LOW (ref 34.5–45)
HGB BLD-MCNC: 10.5 G/DL — LOW (ref 11.5–15.5)
MAGNESIUM SERPL-MCNC: 2 MG/DL — SIGNIFICANT CHANGE UP (ref 1.6–2.6)
MCHC RBC-ENTMCNC: 31.3 PG — SIGNIFICANT CHANGE UP (ref 27–34)
MCHC RBC-ENTMCNC: 32.5 GM/DL — SIGNIFICANT CHANGE UP (ref 32–36)
MCV RBC AUTO: 96.4 FL — SIGNIFICANT CHANGE UP (ref 80–100)
NRBC # BLD: 0 /100 WBCS — SIGNIFICANT CHANGE UP (ref 0–0)
PHOSPHATE SERPL-MCNC: 4.1 MG/DL — SIGNIFICANT CHANGE UP (ref 2.5–4.5)
PLATELET # BLD AUTO: 325 K/UL — SIGNIFICANT CHANGE UP (ref 150–400)
POTASSIUM SERPL-MCNC: 4.6 MMOL/L — SIGNIFICANT CHANGE UP (ref 3.5–5.3)
POTASSIUM SERPL-SCNC: 4.6 MMOL/L — SIGNIFICANT CHANGE UP (ref 3.5–5.3)
RBC # BLD: 3.35 M/UL — LOW (ref 3.8–5.2)
RBC # FLD: 15.9 % — HIGH (ref 10.3–14.5)
SODIUM SERPL-SCNC: 144 MMOL/L — SIGNIFICANT CHANGE UP (ref 135–145)
SPECIMEN SOURCE: SIGNIFICANT CHANGE UP
WBC # BLD: 15.12 K/UL — HIGH (ref 3.8–10.5)
WBC # FLD AUTO: 15.12 K/UL — HIGH (ref 3.8–10.5)

## 2023-05-04 PROCEDURE — 61781 SCAN PROC CRANIAL INTRA: CPT

## 2023-05-04 PROCEDURE — 88305 TISSUE EXAM BY PATHOLOGIST: CPT | Mod: 26

## 2023-05-04 PROCEDURE — 31267 ENDOSCOPY MAXILLARY SINUS: CPT | Mod: 50

## 2023-05-04 PROCEDURE — 99291 CRITICAL CARE FIRST HOUR: CPT

## 2023-05-04 PROCEDURE — 88313 SPECIAL STAINS GROUP 2: CPT | Mod: 26

## 2023-05-04 PROCEDURE — 88341 IMHCHEM/IMCYTCHM EA ADD ANTB: CPT | Mod: 26

## 2023-05-04 PROCEDURE — 99222 1ST HOSP IP/OBS MODERATE 55: CPT

## 2023-05-04 PROCEDURE — 62165 REMOVE PITUIT TUMOR W/SCOPE: CPT | Mod: 62

## 2023-05-04 PROCEDURE — 88312 SPECIAL STAINS GROUP 1: CPT | Mod: 26

## 2023-05-04 PROCEDURE — 88360 TUMOR IMMUNOHISTOCHEM/MANUAL: CPT | Mod: 26

## 2023-05-04 PROCEDURE — 88342 IMHCHEM/IMCYTCHM 1ST ANTB: CPT | Mod: 26,59

## 2023-05-04 PROCEDURE — 99222 1ST HOSP IP/OBS MODERATE 55: CPT | Mod: GC

## 2023-05-04 PROCEDURE — 31255 NSL/SINS NDSC W/TOT ETHMDCT: CPT | Mod: 50

## 2023-05-04 PROCEDURE — 61782 SCAN PROC CRANIAL EXTRA: CPT

## 2023-05-04 DEVICE — SURGIFLO MATRIX WITH THROMBIN KIT: Type: IMPLANTABLE DEVICE | Status: FUNCTIONAL

## 2023-05-04 DEVICE — SURGIFOAM PAD 8CM X 12.5CM X 10MM (100): Type: IMPLANTABLE DEVICE | Status: FUNCTIONAL

## 2023-05-04 DEVICE — SURGICEL 2 X 14": Type: IMPLANTABLE DEVICE | Status: FUNCTIONAL

## 2023-05-04 DEVICE — MAYFIELD SKULL PIN ADULT PLASTIC: Type: IMPLANTABLE DEVICE | Status: FUNCTIONAL

## 2023-05-04 RX ORDER — LEVOTHYROXINE SODIUM 125 MCG
50 TABLET ORAL DAILY
Refills: 0 | Status: DISCONTINUED | OUTPATIENT
Start: 2023-05-04 | End: 2023-05-06

## 2023-05-04 RX ORDER — SENNA PLUS 8.6 MG/1
2 TABLET ORAL AT BEDTIME
Refills: 0 | Status: DISCONTINUED | OUTPATIENT
Start: 2023-05-04 | End: 2023-05-06

## 2023-05-04 RX ORDER — TRAMADOL HYDROCHLORIDE 50 MG/1
25 TABLET ORAL ONCE
Refills: 0 | Status: DISCONTINUED | OUTPATIENT
Start: 2023-05-04 | End: 2023-05-04

## 2023-05-04 RX ORDER — SODIUM CHLORIDE 9 MG/ML
1000 INJECTION, SOLUTION INTRAVENOUS
Refills: 0 | Status: DISCONTINUED | OUTPATIENT
Start: 2023-05-04 | End: 2023-05-06

## 2023-05-04 RX ORDER — ZIPRASIDONE HYDROCHLORIDE 20 MG/1
40 CAPSULE ORAL
Refills: 0 | Status: DISCONTINUED | OUTPATIENT
Start: 2023-05-04 | End: 2023-05-06

## 2023-05-04 RX ORDER — HYDROCORTISONE 20 MG
20 TABLET ORAL
Refills: 0 | Status: DISCONTINUED | OUTPATIENT
Start: 2023-05-04 | End: 2023-05-06

## 2023-05-04 RX ORDER — LEVOTHYROXINE SODIUM 125 MCG
1 TABLET ORAL
Refills: 0 | DISCHARGE

## 2023-05-04 RX ORDER — ZIPRASIDONE HYDROCHLORIDE 20 MG/1
1 CAPSULE ORAL
Refills: 0 | DISCHARGE

## 2023-05-04 RX ORDER — HYDROCORTISONE 20 MG
20 TABLET ORAL DAILY
Refills: 0 | Status: DISCONTINUED | OUTPATIENT
Start: 2023-05-04 | End: 2023-05-04

## 2023-05-04 RX ORDER — SODIUM CHLORIDE 9 MG/ML
1000 INJECTION INTRAMUSCULAR; INTRAVENOUS; SUBCUTANEOUS
Refills: 0 | Status: DISCONTINUED | OUTPATIENT
Start: 2023-05-04 | End: 2023-05-04

## 2023-05-04 RX ORDER — SODIUM CHLORIDE 9 MG/ML
1000 INJECTION, SOLUTION INTRAVENOUS
Refills: 0 | Status: DISCONTINUED | OUTPATIENT
Start: 2023-05-04 | End: 2023-05-04

## 2023-05-04 RX ORDER — POLYETHYLENE GLYCOL 3350 17 G/17G
17 POWDER, FOR SOLUTION ORAL DAILY
Refills: 0 | Status: DISCONTINUED | OUTPATIENT
Start: 2023-05-04 | End: 2023-05-05

## 2023-05-04 RX ORDER — MULTIVIT-MIN/FERROUS GLUCONATE 9 MG/15 ML
1 LIQUID (ML) ORAL DAILY
Refills: 0 | Status: DISCONTINUED | OUTPATIENT
Start: 2023-05-04 | End: 2023-05-06

## 2023-05-04 RX ORDER — ACETAMINOPHEN 500 MG
1000 TABLET ORAL ONCE
Refills: 0 | Status: COMPLETED | OUTPATIENT
Start: 2023-05-04 | End: 2023-05-04

## 2023-05-04 RX ORDER — CEFAZOLIN SODIUM 1 G
2000 VIAL (EA) INJECTION ONCE
Refills: 0 | Status: COMPLETED | OUTPATIENT
Start: 2023-05-04 | End: 2023-05-04

## 2023-05-04 RX ORDER — HYDROMORPHONE HYDROCHLORIDE 2 MG/ML
0.5 INJECTION INTRAMUSCULAR; INTRAVENOUS; SUBCUTANEOUS
Refills: 0 | Status: DISCONTINUED | OUTPATIENT
Start: 2023-05-04 | End: 2023-05-04

## 2023-05-04 RX ORDER — HYDROCORTISONE 20 MG
10 TABLET ORAL
Refills: 0 | Status: DISCONTINUED | OUTPATIENT
Start: 2023-05-04 | End: 2023-05-06

## 2023-05-04 RX ORDER — HYDROMORPHONE HYDROCHLORIDE 2 MG/ML
1 INJECTION INTRAMUSCULAR; INTRAVENOUS; SUBCUTANEOUS
Refills: 0 | Status: DISCONTINUED | OUTPATIENT
Start: 2023-05-04 | End: 2023-05-04

## 2023-05-04 RX ORDER — LIDOCAINE HCL 20 MG/ML
0.2 VIAL (ML) INJECTION ONCE
Refills: 0 | Status: DISCONTINUED | OUTPATIENT
Start: 2023-05-04 | End: 2023-05-04

## 2023-05-04 RX ORDER — SODIUM CHLORIDE 0.65 %
1 AEROSOL, SPRAY (ML) NASAL EVERY 4 HOURS
Refills: 0 | Status: DISCONTINUED | OUTPATIENT
Start: 2023-05-04 | End: 2023-05-05

## 2023-05-04 RX ORDER — MULTIVIT-MIN/FERROUS GLUCONATE 9 MG/15 ML
1 LIQUID (ML) ORAL
Refills: 0 | DISCHARGE

## 2023-05-04 RX ORDER — ONDANSETRON 8 MG/1
8 TABLET, FILM COATED ORAL ONCE
Refills: 0 | Status: DISCONTINUED | OUTPATIENT
Start: 2023-05-04 | End: 2023-05-06

## 2023-05-04 RX ADMIN — Medication 1000 MILLIGRAM(S): at 12:00

## 2023-05-04 RX ADMIN — SODIUM CHLORIDE 3 MILLILITER(S): 9 INJECTION INTRAMUSCULAR; INTRAVENOUS; SUBCUTANEOUS at 14:00

## 2023-05-04 RX ADMIN — TRAMADOL HYDROCHLORIDE 25 MILLIGRAM(S): 50 TABLET ORAL at 15:30

## 2023-05-04 RX ADMIN — Medication 1 SPRAY(S): at 14:49

## 2023-05-04 RX ADMIN — Medication 1 TABLET(S): at 14:48

## 2023-05-04 RX ADMIN — TRAMADOL HYDROCHLORIDE 25 MILLIGRAM(S): 50 TABLET ORAL at 14:48

## 2023-05-04 RX ADMIN — Medication 400 MILLIGRAM(S): at 11:45

## 2023-05-04 RX ADMIN — ZIPRASIDONE HYDROCHLORIDE 40 MILLIGRAM(S): 20 CAPSULE ORAL at 17:11

## 2023-05-04 RX ADMIN — Medication 1 SPRAY(S): at 17:13

## 2023-05-04 NOTE — BRIEF OPERATIVE NOTE - NSICDXBRIEFPROCEDURE_GEN_ALL_CORE_FT
PROCEDURES:  Endoscopic transphenoidal or transnasal resection of pituitary tumor 04-May-2023 11:03:25  Roxana Roman

## 2023-05-04 NOTE — BH CONSULTATION LIAISON ASSESSMENT NOTE - RISK ASSESSMENT
Risk factors: h/o psychosis, acute/chronic medical conditions    Protective factors: no current SIIP/HIIP, no h/o SA/SIB, no h/o psych admissions, no active substance abuse, domiciled, intact marriage, social supports, positive therapeutic relationship, engaged in treatment, compliant with treatment, help-seeking behaviors, future-orientation    Overall, pt is at low acute risk of harm and does not meet criteria for psychiatric admission at this time.

## 2023-05-04 NOTE — PRE-OP CHECKLIST - PATIENT'S PERSONAL PROPERTY GIVEN TO
c/o lower back pain x 2 days. 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 pt describes pain as spasms, non-radiating, worse with movement, and currently 8/10. pt reports she took Flexeril from her  last night in which did not work. Plan includes dc with rx for robaxin. pt declined meds in ED, declined Toradol or Valium. reports she has Ibuprofen at home.
family member

## 2023-05-04 NOTE — CHART NOTE - NSCHARTNOTEFT_GEN_A_CORE
CAPRINI SCORE [CLOT] Score on Admission for     AGE RELATED RISK FACTORS                                                       MOBILITY RELATED FACTORS  [ ] Age 41-60 years                                            (1 Point)                  [ x] Bed rest                                                        (1 Point)  [ x] Age 61-74 years                                           (2 Points)                 [ ] Plaster cast                                                   (2 Points)  [ ] Age > 75 years                                              (3 Points)                 [ ] Bed bound for more than 72 hours         (2 Points)    DISEASE RELATED RISK FACTORS                                               GENDER SPECIFIC FACTORS  [ ] Edema in the lower extremities                       (1 Point)           [ ] Pregnancy                                                          (1 Point)  [ ] Varicose veins                                               (1 Point)                  [ ] Post-partum < 6 weeks                                   (1 Point)             [ ] BMI > 25 Kg/m2                                            (1 Point)                  [ ] Hormonal therapy  or oral contraception   (1 Point)                 [ ] Sepsis (in the previous month)                        (1 Point)            [ ] History of pregnancy complications             (1 point)  [ ] Pneumonia or serious lung disease                                            [ ] Unexplained or recurrent               (1 Point)           (in the previous month)                               (1 Point)  [ ] Abnormal pulmonary function test                     (1 Point)                 SURGERY RELATED RISK FACTORS (include planned surgeries)  [ ] Acute myocardial infarction                              (1 Point)                 [ ]  Section                                             (1 Point)  [ ] Congestive heart failure (in the previous month)  (1 Point)        [ ] Minor surgery                                                  (1 Point)   [ ] Inflammatory bowel disease                             (1 Point)                 [ ] Arthroscopic surgery                                        (2 Points)  [ ] Central venous access                                      (2 Points)  [x ] History / presence of malignancy                   (2 points)   [ ] General surgery lasting more than 45 minutes   (2 Points)       [ ] Stroke (in the previous month)                          (5 Points)               [ ] Elective arthroplasty                                         (5 Points)                                                                                                                                               HEMATOLOGY RELATED FACTORS                                                 TRAUMA RELATED RISK FACTORS  [ ] Prior episodes of VTE                                     (3 Points)                [ ] Fracture of the hip, pelvis, or leg                       (5 Points)  [ ] Positive family history for VTE                         (3 Points)             [ ] Acute spinal cord injury (in the previous month)  (5 Points)  [ ] Prothrombin 13812 A                                     (3 Points)                [ ] Paralysis  (less than 1 month)                             (5 Points)  [ ] Factor V Leiden                                             (3 Points)                   [ ] Multiple Trauma within 1 month                        (5 Points)  [ ] Lupus anticoagulants                                     (3 Points)                                                           [ ] Anticardiolipin antibodies                               (3 Points)                                                       [ ] High homocysteine in the blood                      (3 Points)                                             [ ] Other congenital or acquired thrombophilia      (3 Points)                                                [ ] Heparin induced thrombocytopenia                  (3 Points)                                          Total Score [     5     ]    Risk:  Very low 0   Low 1 to 2   Moderate 3 to 4   High =5       VTE Prophylaxis Recommendations:  [x ] mechanical pneumatic compression devices                                      [ ] contraindicated: _____________________  [ ] chemoprophylaxis                                                                                    [x ] contraindicated: _fresh post op ________    **** HIGH LIKELIHOOD DVT PRESENT ON ADMISSION  [ ] (please order LE dopplers within 24 hours of admission)
CAPRINI SCORE [CLOT]    AGE RELATED RISK FACTORS                                                       MOBILITY RELATED FACTORS  [ ] Age 41-60 years                                            (1 Point)                  [ ] Bed rest                                                        (1 Point)  [x ] Age: 61-74 years                                           (2 Points)                 [ ] Plaster cast                                                   (2 Points)  [ ] Age= 75 years                                              (3 Points)                 [ ] Bed bound for more than 72 hours                 (2 Points)    DISEASE RELATED RISK FACTORS                                               GENDER SPECIFIC FACTORS  [ ] Edema in the lower extremities                       (1 Point)                  [ ] Pregnancy                                                     (1 Point)  [ ] Varicose veins                                               (1 Point)                  [ ] Post-partum < 6 weeks                                   (1 Point)             [ ] BMI > 25 Kg/m2                                            (1 Point)                  [ ] Hormonal therapy  or oral contraception          (1 Point)                 [ ] Sepsis (in the previous month)                        (1 Point)                  [ ] History of pregnancy complications                 (1 point)  [ ] Pneumonia or serious lung disease                                               [ ] Unexplained or recurrent                     (1 Point)           (in the previous month)                               (1 Point)  [ ] Abnormal pulmonary function test                     (1 Point)                 SURGERY RELATED RISK FACTORS  [ ] Acute myocardial infarction                              (1 Point)                 [ ]  Section                                             (1 Point)  [ ] Congestive heart failure (in the previous month)  (1 Point)               [ ] Minor surgery                                                  (1 Point)   [ ] Inflammatory bowel disease                             (1 Point)                 [ ] Arthroscopic surgery                                        (2 Points)  [ ] Central venous access                                      (2 Points)                [ x] General surgery lasting more than 45 minutes   (2 Points)       [ ] Stroke (in the previous month)                          (5 Points)               [ ] Elective arthroplasty                                         (5 Points)                                                                                                                                               HEMATOLOGY RELATED FACTORS                                                 TRAUMA RELATED RISK FACTORS  [ ] Prior episodes of VTE                                     (3 Points)                [ ] Fracture of the hip, pelvis, or leg                       (5 Points)  [ ] Positive family history for VTE                         (3 Points)                 [ ] Acute spinal cord injury (in the previous month)  (5 Points)  [ ] Prothrombin 22505 A                                     (3 Points)                 [ ] Paralysis  (less than 1 month)                             (5 Points)  [ ] Factor V Leiden                                             (3 Points)                  [ ] Multiple Trauma within 1 month                        (5 Points)  [ ] Lupus anticoagulants                                     (3 Points)                                                           [ ] Anticardiolipin antibodies                               (3 Points)                                                       [ ] High homocysteine in the blood                      (3 Points)                                             [ ] Other congenital or acquired thrombophilia      (3 Points)                                                [ ] Heparin induced thrombocytopenia                  (3 Points)                                          Total Score [       4   ]    Caprini Score 0 - 2:  Low Risk, No VTE Prophylaxis required for most patients, encourage ambulation  Caprini Score 3 - 6:  At Risk, pharmacologic VTE prophylaxis is indicated for most patients (in the absence of a contraindication)  Caprini Score Greater than or = 7:  High Risk, pharmacologic VTE prophylaxis is indicated for most patients (in the absence of a contraindication)

## 2023-05-04 NOTE — BH CONSULTATION LIAISON ASSESSMENT NOTE - NSBHCONSULTRECOMMENDOTHER_PSY_A_CORE FT
If QTc<500 can c/w home dose Geodon and utilize PRN: Zyprexa 2.5mg PO/IM q6h PRN agitation    OP psych f/u with own psychiatrist Dr. Jeffries after discharge

## 2023-05-04 NOTE — BH CONSULTATION LIAISON ASSESSMENT NOTE - CURRENT MEDICATION
MEDICATIONS  (STANDING):  acetaminophen   IVPB .. 1000 milliGRAM(s) IV Intermittent once  chlorhexidine 2% Cloths 1 Application(s) Topical once  hydrocortisone 20 milliGRAM(s) Oral daily  levothyroxine 50 MICROGram(s) Oral daily  multivitamin/minerals 1 Tablet(s) Oral daily  polyethylene glycol 3350 17 Gram(s) Oral daily  senna 2 Tablet(s) Oral at bedtime  sodium chloride 0.65% Nasal 1 Spray(s) Both Nostrils every 4 hours  sodium chloride 0.9% lock flush 3 milliLiter(s) IV Push every 8 hours  sodium chloride 0.9%. 1000 milliLiter(s) (75 mL/Hr) IV Continuous <Continuous>  ziprasidone 40 milliGRAM(s) Oral two times a day    MEDICATIONS  (PRN):  ondansetron Injectable 8 milliGRAM(s) IV Push once PRN Nausea and/or Vomiting

## 2023-05-04 NOTE — CONSULT NOTE ADULT - SUBJECTIVE AND OBJECTIVE BOX
ENDOCRINE INITIAL CONSULT - Pit macroadenoma s/p TSP     HPI:  68 yo female, PMH hypothyroid, psychiatric disorders with psychosis and hallucination f/u with psychologist and stable on medication,  sinus surgery, septal perforation, chronic sinusitis, lumbar spine stenosis s/p lumbar laminectomy. Pt. reports that she has been losing her vision due to a  pituitary tumor discovered over a year ago. Pt. presents to PST for scheduled Endoscopic Transphenoidal Removal of Pituitary Tumor, Functional endoscopic Sinus Surgery with Image Guidance on 23. Denies recent fever, chills, chest pain, SOB, changes in bowel/urinary habits or recent exposure to COVID-19 - pt. states she never had COVID infection    Pt. will have pre-op medical clearance on   Appointment with Dr. Hernadez, ENT on   Brief neurosurgery note stating that they have called Dr. Clark and that she is stable on psych medication (placed in chart) (2023 18:12)      ENDOCRINE HISTORY   Patient with hx of pituitary lesion since ~  per patient/spouse & chart review. Incidentally noted on imaging done with neurology for HA/psychiatric reasons/hallucinations?   Per review of outpatient [employer] [facility] documentation, MRI in May 2022 had shown a sellar mass with suprasellar extension for which she was referred to see Dr. Rust.   Per pt/spouse, they were also referred to see ENT & established care with endocrinology Dr. Astrid Boyd on [facility].   After blood work and testing, patient was started on hydrocortisone 10mg in the morning and 10mg around bedtime as well as levothyroxine 75mcg? daily. Outpatient med list has recorded: HC 10mg daily and LT4 25mcg daily in 2023. Med rec here shows LT4 50mcg daily & HC 20mg daily. Other than these medications, she takes only 2 other medications per spouse - one that starts with a Z for psych reasons and turmeric tablets.   No prior known hx of thyroid disease or other hormonal issues prior to evaluation with Dr. Boyd earlier this year.     MRI Brain from 23 shows 1.3 x 1.5 x 1.9cm pituitary gland with findings possibly concerning for apoplexy    Patient reports recent worsening of vision which she says is now better after surgery. Outpatient opthalmology note documents prior known hx of b/l optic atrophy as well.     Now POD #0 after TSP. Does not report increased thirst or urination.   Urine in catheter currently appears very dilute.   Per covering RN has been putting out around 300cc/hr the last 1-2 hours.     PAST MEDICAL & SURGICAL HISTORY:  Lumbar spinal stenosis      Lumbar herniated disc  unsure levels      Urinary frequency      Anemia      Osteoarthritis      Obesity, Class I, BMI 30-34.9      Tendonitis  right shoulder      Mass on back  left upper      Mass of arm, left      H/O chronic sinusitis      Nasal septal perforation      Pituitary mass      H/O psychosis      Hypothyroid      H/O  section  x 2 refused to say when      History of lumbar laminectomy  2017 L4-5, L5-S1      H/O excision of mass  unsure what part of body or when surgery was done      History of bunionectomy  right foot          FAMILY HISTORY:  Family history of DVT (Child)    FHx: coronary artery disease (Child, Father, Mother)    Family history of osteoarthritis (Mother)    Family history of gout (Father)    Family history of arrhythmia (Father)        Social History:      Home Medications:  hydrocortisone 20 mg oral tablet: 1 orally once a day (04 May 2023 07:12)  levothyroxine 50 mcg (0.05 mg) oral tablet: 1 tab(s) orally once a day (04 May 2023 07:12)  Ocuvite oral tablet: 1 tab(s) orally once a day (04 May 2023 07:12)  Tumeric: 1 tab oral daily (04 May 2023 07:12)  ziprasidone 40 mg oral capsule: 1 orally 2 times a day (04 May 2023 07:12)      MEDICATIONS  (STANDING):  chlorhexidine 2% Cloths 1 Application(s) Topical once  hydrocortisone 20 milliGRAM(s) Oral daily  levothyroxine 50 MICROGram(s) Oral daily  multivitamin/minerals 1 Tablet(s) Oral daily  polyethylene glycol 3350 17 Gram(s) Oral daily  senna 2 Tablet(s) Oral at bedtime  sodium chloride 0.65% Nasal 1 Spray(s) Both Nostrils every 4 hours  sodium chloride 0.9% lock flush 3 milliLiter(s) IV Push every 8 hours  sodium chloride 0.9%. 1000 milliLiter(s) (75 mL/Hr) IV Continuous <Continuous>  ziprasidone 40 milliGRAM(s) Oral two times a day    MEDICATIONS  (PRN):  ondansetron Injectable 8 milliGRAM(s) IV Push once PRN Nausea and/or Vomiting      Allergies    No Known Drug Allergies  TSP** SKULL BASE PRECAUTIONS*** (Unknown)  seasonal allergies (Sneezing)  dust (Sneezing)  citrus (Anaphylaxis)    Intolerances        REVIEW OF SYSTEMS  Constitutional: No fever  Eyes: No blurry vision  Neuro: No tremors, +headaches   HEENT: No pain  Cardiovascular: No chest pain, palpitations  Respiratory: No SOB, no cough  GI: No nausea, vomiting, abdominal pain  : No dysuria  Skin: no rash  Psych: no depression  Endocrine: no polyuria, polydipsia  Hem/lymph: no swelling  Osteoporosis: no fractures  ALL OTHER SYSTEMS REVIEWED AND NEGATIVE     PHYSICAL EXAM   Vital Signs Last 24 Hrs  T(C): 36.6 (04 May 2023 11:15), Max: 36.6 (04 May 2023 11:15)  T(F): 97.9 (04 May 2023 11:15), Max: 97.9 (04 May 2023 11:15)  HR: 93 (04 May 2023 14:30) (72 - 93)  BP: 144/73 (04 May 2023 11:20) (124/73 - 144/73)  BP(mean): 92 (04 May 2023 11:20) (92 - 92)  RR: 20 (04 May 2023 14:30) (12 - 20)  SpO2: 97% (04 May 2023 14:30) (93% - 99%)    Parameters below as of 04 May 2023 11:15  Patient On (Oxygen Delivery Method): mask, Venturi  O2 Flow (L/min): 50    GENERAL: NAD, well-groomed, well-developed  EYES: No proptosis, no lid lag, anicteric  HEENT:  Atraumatic, Normocephalic, moist mucous membranes  THYROID: Normal size, no palpable nodules  RESPIRATORY: Nonlabored respirations, normal rate/effort   CARDIOVASCULAR: Regular rate and rhythm; No murmurs; no peripheral edema  GI: Soft, nontender, non distended, normal bowel sounds  : urine in ryan appears dilute   SKIN: Dry, intact, No rashes or lesions  MUSCULOSKELETAL: Full range of motion, normal strength  NEURO: sensation intact, extraocular movements intact, no tremor  PSYCH: Alert and oriented x 3, reactive affect/mood   CUSHING'S SIGNS: no striae    CAPILLARY BLOOD GLUCOSE                              LIPIDS    RADIOLOGY

## 2023-05-04 NOTE — PROGRESS NOTE ADULT - SUBJECTIVE AND OBJECTIVE BOX
NSCU Progress Note    Assessment/Hospital Course:    68 yo female, PMH hypothyroid, psychiatric disorders with psychosis and hallucination f/u with psychologist and stable on medication,  sinus surgery, septal perforation, chronic sinusitis, lumbar spine stenosis s/p lumbar laminectomy. Pt. reports that she has been losing her vision due to a  pituitary tumor discovered over a year ago. Pt. presents to PST for scheduled Endoscopic Transphenoidal Removal of Pituitary Tumor, Functional endoscopic Sinus Surgery with Image Guidance on 5/4/23. Denies recent fever, chills, chest pain, SOB, changes in bowel/urinary habits or recent exposure to COVID-19 - pt. states she never had COVID infection      24 Hour Events/Subjective:  - POD0 s/p TSP for pituitary tumor, saw what looked like inflammed gland, send for path and cultures, hypophysitis  -       REVIEW OF SYSTEMS:  - negative except as above    VITALS:   -reviewed      IMAGING/DATA:   - Reviewed          PHYSICAL EXAM:    General: calm  CVS: RRR  Pulm: CTAB  GI: Soft, NTND  Extremities: No LE Edema  Neuro: AOx3, PERRL, non-copmpliant with vsual exam though appears to attend to both sides, +BTT "Sees shadows and intermittently counts fingers, + light perception in Lt,, facial symmetrical, fluent speech, motor 5/5 throughout, no PND

## 2023-05-04 NOTE — BH CONSULTATION LIAISON ASSESSMENT NOTE - NSICDXPASTSURGICALHX_GEN_ALL_CORE_FT
PAST SURGICAL HISTORY:  H/O  section x 2 refused to say when    H/O excision of mass unsure what part of body or when surgery was done    History of bunionectomy right foot    History of lumbar laminectomy 2017 L4-5, L5-S1

## 2023-05-04 NOTE — CONSULT NOTE ADULT - ASSESSMENT
68 yo female with hx of psychiatric disorders with psychosis and hallucination f/u with psychologist and stable on medication, sinus surgery, septal perforation, chronic sinusitis, lumbar spine stenosis s/p lumbar laminectomy, b/l optic atrophy, pituitary tumor found in 2022 now s/p  TSP on 5/4. Endocrinology consulted for assistance and management of hypophysitis.     #Enlarged pituitary gland s/p TSP   #secondary hypothyroidism   #secondary AI   #DI watch   MR Brain Stereotactic w/wo IV Cont (04.26.23 @ 20:55: The pituitary gland and the infundibulum are enlarged, in total measuring   1.3 x 1.5 x 1.9 cm; Heterogeneous, predominantly peripheral enhancement of the pituitary gland, with central region of nonenhancing T2 hyperintense signal. Findings could represent the presence of pituitary apoplexy. No optic chiasm compression. No cavernous sinus invasion.  Following with Dr. Astrid Boyd as outpatient - on LT4 daily- 75mcg? and HC 10mg BID at home, started after preop hormonal evaluation   Per review of anesthesia record, received HC 100mg x 1 dose 5/4 AM perioperatively   Recommendations:   - f/u surgical path report   - check free T4 with am labs   - Continue LT4 50mcg PO daily, to be taken on an empty stomach at least 30 mins prior to food/other meds   - Continue HC 20mg PO q8AM, 10mg PO q3PM   - repeat BMP this afternoon   - DI Watch: Please monitor strict I/O, sodium levels q8hrs (monitor for hypo or hypernatremia). Goal Na 140-145  If urine output more than 500ml/h or 250ml/h for 2 consecutive hours get stat Na, if Na is increasing again compared to prior than bolus 1/2 NS to match half the output (for example, if net negative 2Liters can give 1Liter 1/2NS if she is unable to keep up with output with PO intake)  - Please inform endocrine if DI watch is positive and Na is increasing   - If Na > increases to 140 or higher would dose DDAVP 0.05mg and continue DI watch as above   - Note if Na continues to increase despite a a DDAVP dose please inform endocrine   - Please make sure pt has access to water and encourage her to drink to thirst   DC Planning: Likely on HC, LT4. TBD if patient will require DDAVP. Outpatient endocrinology follow up with Dr. Astrid Blanco on discharge. Patient should also follow up with Neurosurgery & neuroophthalmology after discharge.     Zahida Leone,    Endocrine Fellow  For follow up questions, discharge recommendations, or new consults please call answering service at [phone] (weekdays), [phone] (nights/weekends). For nonurgent matters, please email [email or web address] or [email or web address]        68 yo female with hx of psychiatric disorders with psychosis and hallucination f/u with psychologist and stable on medication, sinus surgery, septal perforation, chronic sinusitis, lumbar spine stenosis s/p lumbar laminectomy, b/l optic atrophy, pituitary tumor found in 2022 now s/p  TSP on 5/4. Endocrinology consulted for assistance and management of hypophysitis.     #Enlarged pituitary gland s/p TSP  macroadenoma/tumor? 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 concern for inflamed gland?  #secondary hypothyroidism   #secondary AI   #DI watch   MR Brain Stereotactic w/wo IV Cont (04.26.23 @ 20:55: The pituitary gland and the infundibulum are enlarged, in total measuring   1.3 x 1.5 x 1.9 cm; Heterogeneous, predominantly peripheral enhancement of the pituitary gland, with central region of nonenhancing T2 hyperintense signal. Findings could represent the presence of pituitary apoplexy. No optic chiasm compression. No cavernous sinus invasion.  Following with Dr. Astrid Boyd as outpatient - on LT4 daily- 75mcg? and HC 10mg BID at home, started after preop hormonal evaluation   Per review of anesthesia record, received HC 100mg x 1 dose 5/4 AM perioperatively   Recommendations:   - f/u surgical path report   - Continue LT4 50mcg PO daily, to be taken on an empty stomach at least 30 mins prior to food/other meds   - Start HC 20mg PO q8AM, 10mg PO q3PM   - repeat BMP this afternoon   - DI Watch: Please monitor strict I/O, sodium levels q8hrs (monitor for hypo or hypernatremia). Goal Na 140-145  If urine output more than 500ml/h or 250ml/h for 2 consecutive hours get stat Na, if Na is increasing again compared to prior than bolus 1/2 NS to match half the output (for example, if net negative 2Liters can give 1Liter 1/2NS if she is unable to keep up with output with PO intake)  - Please inform endocrine if DI watch is positive and Na is increasing   - If Na > increases to 140 or higher would dose DDAVP 0.05mg and continue DI watch as above   - Note if Na continues to increase despite a a DDAVP dose please inform endocrine   - Please make sure pt has access to water and encourage her to drink to thirst   DC Planning: Likely on HC, LT4. TBD if patient will require DDAVP. Outpatient endocrinology follow up with Dr. Astrid Blanco on discharge. Patient should also follow up with Neurosurgery & neuroophthalmology after discharge.     recs discussed with [facility] TAYLOR Mg     Case discussed with Dr. Brittany Leone, DO   Endocrine Fellow  For follow up questions, discharge recommendations, or new consults please call answering service at [phone] (weekdays), [phone] (nights/weekends). For nonurgent matters, please email [email or web address] or [email or web address]

## 2023-05-04 NOTE — CONSULT NOTE ADULT - ATTENDING COMMENTS
Agree with assessment and plan as above by Dr. Leone. Reviewed all pertinent labs, glucose values, and imaging studies. Modifications made as indicated above. Pt. with pituitary mass with possible apoplexy s/p resection with hx of secondary hypothyroidism and secondary AI prior to surgery. Restart replacement doses of levothyroxine and hydrocortisone as above. Maybe able to reassess axes as outpatient and d/c based on levels and extent of resection. Monitor for risk of post-op DI. Monitor urine output, urine osm, and serum, sodium. Follow-up path Outpatient optho follow-up. Can follow-up with Dr. Astrid Blanco upon discharge.    Les Soto D.O  [phone]

## 2023-05-04 NOTE — BH CONSULTATION LIAISON ASSESSMENT NOTE - NSBHCHARTREVIEWINVESTIGATE_PSY_A_CORE FT
< from: 12 Lead ECG (01.13.20 @ 11:05) >      Ventricular Rate 90 BPM    Atrial Rate 90 BPM    P-R Interval 158 ms    QRS Duration 82 ms    Q-T Interval 372 ms    QTC Calculation(Bezet) 455 ms    P Axis 50 degrees    R Axis -29 degrees    T Axis 48 degrees    Diagnosis Line Normal sinus rhythm  Low voltage in limb leads.  Low voltage QRS  Normal ECG  No previous ECGs available    Confirmed by RAMIRO DIXON MD ([id number]) on 1/14/2020 9:02:19 AM    < end of copied text >

## 2023-05-04 NOTE — BH CONSULTATION LIAISON ASSESSMENT NOTE - NSBHCHARTREVIEWVS_PSY_A_CORE FT
Vital Signs Last 24 Hrs  T(C): 36.6 (04 May 2023 11:15), Max: 36.6 (04 May 2023 11:15)  T(F): 97.9 (04 May 2023 11:15), Max: 97.9 (04 May 2023 11:15)  HR: 72 (04 May 2023 14:00) (72 - 84)  BP: 144/73 (04 May 2023 11:20) (124/73 - 144/73)  BP(mean): 92 (04 May 2023 11:20) (92 - 92)  RR: 13 (04 May 2023 14:00) (12 - 18)  SpO2: 96% (04 May 2023 14:00) (93% - 99%)    Parameters below as of 04 May 2023 11:15  Patient On (Oxygen Delivery Method): mask, Venturi  O2 Flow (L/min): 50

## 2023-05-04 NOTE — PROGRESS NOTE ADULT - ASSESSMENT
s/p TSP for pituitary tumor, saw what looked like inflammed gland, send for path and cultures, hypophysitis    NEURO:  - neuro checks q1h  - repeat CTH AM  - MRI WWO brain  - DI watch, endocrine labs, Cortisol x3d (5/4 - )  - pain control  - pituitary precautions  - c/w psych meds  - behavioral consult    CVS:  - SBP goal 100 -160  - cardene PRN to meet SBP goal    PULM:  - RA, maintain sats >92%  - no IS, pituitary precautions    RENAL:  - Fluids: NS 75cc/hr  - strict IOs    GI:  - Diet: CLD, ADAT  - GI prophylaxis: N/A  - Bowel regimen: colace, senna    ENDO:   - FS goal 120-180  - AM cortisol x3 days (5/4 - )  - endo labs am  - DI watch  - resume home hydrocort  - c/w synthroid  - endo c/s    HEME/ONC:  - SCDs  - Chemoppx: hold due to fresh post op      ID:  - monitor for fevers

## 2023-05-04 NOTE — BH CONSULTATION LIAISON ASSESSMENT NOTE - NSICDXPASTMEDICALHX_GEN_ALL_CORE_FT
PAST MEDICAL HISTORY:  Anemia     H/O chronic sinusitis     H/O psychosis     Hypothyroid     Lumbar herniated disc unsure levels    Lumbar spinal stenosis     Mass of arm, left     Mass on back left upper    Nasal septal perforation     Obesity, Class I, BMI 30-34.9     Osteoarthritis     Pituitary mass     Tendonitis right shoulder    Urinary frequency

## 2023-05-04 NOTE — BH CONSULTATION LIAISON ASSESSMENT NOTE - HPI (INCLUDE ILLNESS QUALITY, SEVERITY, DURATION, TIMING, CONTEXT, MODIFYING FACTORS, ASSOCIATED SIGNS AND SYMPTOMS)
67F domiciled at home with , on disability (former , realtor, and had own chocolate business), noncaregiver, with PPHx psychosis NOS x1 year on Geodon followed by OP psychiatrist Dr. Eduin Jeffries, no prior SA or psychiatric admissions, no active substance abuse, with PMH significant for hypothyroid, sinus surgery, septal perforation, chronic sinusitis, lumbar spine stenosis s/p lumbar laminectomy, presents to PST for scheduled endoscopic transphenoidal removal of pituitary tumor, psychiatry consulted for medication management of her psychiatric medication.     Pt seen at bedside, friend visiting pt and helping her eat lunch.  Pt AOx4, states she is in the hospital for pituitary mass removal.  States she developed V/H of snakes and animals jumping on her about 1 year ago which were very distressing at the time.  Saw Dr. Jeffries in the community who started her on Geodon with complete resolution of sx.  States she has not had V/H in a long time, fearful they will return off her medications.  Denies SI/HI, AVH, paranoia, depression, anxiety, or manic sx.  Denies substance abuse.  Shares a supportive relationship with her . States her  manages their finances, she does the cooking.  Intends to continue to f/u with her own psychiatrist as OP.

## 2023-05-04 NOTE — PATIENT PROFILE ADULT - PATIENT'S GENDER IDENTITY
1. Return to clinic in 6 months with CT scan. Sooner with any concerns.     2. Continue to try to be as active as you can be    4. Nothing additional to add to plan of care with regards to elevated CRP, SED rate.    Female

## 2023-05-04 NOTE — BH CONSULTATION LIAISON ASSESSMENT NOTE - DESCRIPTION
, had a daughter who was murdered in 2017 per chart, lives with her  with whom she has a supportive relationship  currently disabled, previously worked as a realtor, a  in the city, and had her own chocolate business
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
## 2023-05-05 LAB
ANION GAP SERPL CALC-SCNC: 13 MMOL/L — SIGNIFICANT CHANGE UP (ref 5–17)
B-OH-BUTYR SERPL-SCNC: 0.1 MMOL/L — SIGNIFICANT CHANGE UP
BUN SERPL-MCNC: 24 MG/DL — HIGH (ref 7–23)
CALCIUM SERPL-MCNC: 8.9 MG/DL — SIGNIFICANT CHANGE UP (ref 8.4–10.5)
CHLORIDE SERPL-SCNC: 108 MMOL/L — SIGNIFICANT CHANGE UP (ref 96–108)
CO2 SERPL-SCNC: 23 MMOL/L — SIGNIFICANT CHANGE UP (ref 22–31)
CREAT ?TM UR-MCNC: 17 MG/DL — SIGNIFICANT CHANGE UP
CREAT SERPL-MCNC: 0.7 MG/DL — SIGNIFICANT CHANGE UP (ref 0.5–1.3)
EGFR: 95 ML/MIN/1.73M2 — SIGNIFICANT CHANGE UP
GLUCOSE SERPL-MCNC: 105 MG/DL — HIGH (ref 70–99)
MAGNESIUM SERPL-MCNC: 2.2 MG/DL — SIGNIFICANT CHANGE UP (ref 1.6–2.6)
PHOSPHATE SERPL-MCNC: 3.5 MG/DL — SIGNIFICANT CHANGE UP (ref 2.5–4.5)
POTASSIUM SERPL-MCNC: 3.9 MMOL/L — SIGNIFICANT CHANGE UP (ref 3.5–5.3)
POTASSIUM SERPL-SCNC: 3.9 MMOL/L — SIGNIFICANT CHANGE UP (ref 3.5–5.3)
SODIUM SERPL-SCNC: 144 MMOL/L — SIGNIFICANT CHANGE UP (ref 135–145)
SODIUM UR-SCNC: 38 MMOL/L — SIGNIFICANT CHANGE UP

## 2023-05-05 PROCEDURE — 70553 MRI BRAIN STEM W/O & W/DYE: CPT | Mod: 26

## 2023-05-05 PROCEDURE — 93010 ELECTROCARDIOGRAM REPORT: CPT

## 2023-05-05 PROCEDURE — 93970 EXTREMITY STUDY: CPT | Mod: 26

## 2023-05-05 PROCEDURE — 99232 SBSQ HOSP IP/OBS MODERATE 35: CPT

## 2023-05-05 RX ORDER — ENOXAPARIN SODIUM 100 MG/ML
40 INJECTION SUBCUTANEOUS
Refills: 0 | Status: DISCONTINUED | OUTPATIENT
Start: 2023-05-05 | End: 2023-05-06

## 2023-05-05 RX ORDER — POLYETHYLENE GLYCOL 3350 17 G/17G
17 POWDER, FOR SOLUTION ORAL
Refills: 0 | Status: DISCONTINUED | OUTPATIENT
Start: 2023-05-05 | End: 2023-05-06

## 2023-05-05 RX ORDER — CHLORHEXIDINE GLUCONATE 213 G/1000ML
1 SOLUTION TOPICAL DAILY
Refills: 0 | Status: DISCONTINUED | OUTPATIENT
Start: 2023-05-05 | End: 2023-05-05

## 2023-05-05 RX ORDER — SODIUM CHLORIDE 0.65 %
1 AEROSOL, SPRAY (ML) NASAL EVERY 4 HOURS
Refills: 0 | Status: DISCONTINUED | OUTPATIENT
Start: 2023-05-05 | End: 2023-05-06

## 2023-05-05 RX ORDER — OLANZAPINE 15 MG/1
2.5 TABLET, FILM COATED ORAL EVERY 6 HOURS
Refills: 0 | Status: DISCONTINUED | OUTPATIENT
Start: 2023-05-05 | End: 2023-05-06

## 2023-05-05 RX ADMIN — CHLORHEXIDINE GLUCONATE 1 APPLICATION(S): 213 SOLUTION TOPICAL at 11:50

## 2023-05-05 RX ADMIN — SODIUM CHLORIDE 60 MILLILITER(S): 9 INJECTION, SOLUTION INTRAVENOUS at 21:57

## 2023-05-05 RX ADMIN — Medication 1 SPRAY(S): at 17:46

## 2023-05-05 RX ADMIN — Medication 10 MILLIGRAM(S): at 16:24

## 2023-05-05 RX ADMIN — Medication 50 MICROGRAM(S): at 05:49

## 2023-05-05 RX ADMIN — Medication 20 MILLIGRAM(S): at 07:29

## 2023-05-05 RX ADMIN — SODIUM CHLORIDE 60 MILLILITER(S): 9 INJECTION, SOLUTION INTRAVENOUS at 07:29

## 2023-05-05 RX ADMIN — POLYETHYLENE GLYCOL 3350 17 GRAM(S): 17 POWDER, FOR SOLUTION ORAL at 17:46

## 2023-05-05 RX ADMIN — Medication 1 TABLET(S): at 11:50

## 2023-05-05 RX ADMIN — ZIPRASIDONE HYDROCHLORIDE 40 MILLIGRAM(S): 20 CAPSULE ORAL at 18:22

## 2023-05-05 RX ADMIN — ZIPRASIDONE HYDROCHLORIDE 40 MILLIGRAM(S): 20 CAPSULE ORAL at 05:49

## 2023-05-05 RX ADMIN — ENOXAPARIN SODIUM 40 MILLIGRAM(S): 100 INJECTION SUBCUTANEOUS at 17:47

## 2023-05-05 RX ADMIN — SODIUM CHLORIDE 60 MILLILITER(S): 9 INJECTION, SOLUTION INTRAVENOUS at 17:47

## 2023-05-05 RX ADMIN — SODIUM CHLORIDE 60 MILLILITER(S): 9 INJECTION, SOLUTION INTRAVENOUS at 05:50

## 2023-05-05 NOTE — PROVIDER CONTACT NOTE (OTHER) - ASSESSMENT
Pt A&Ox4, restless@times, on room air, w/ VS as charted. Pt denies any pain/discomfort. IV access assessed Q2hr, remains CDI, running plasmalyte@60ml/hr. Intact ryan.

## 2023-05-05 NOTE — OCCUPATIONAL THERAPY INITIAL EVALUATION ADULT - TRANSFER TRAINING, PT EVAL
show GOAL: Patient will perform functional transfer independently with use of rolling walker in 4 weeks.

## 2023-05-05 NOTE — PROGRESS NOTE ADULT - SUBJECTIVE AND OBJECTIVE BOX
ENDOCRINE FOLLOW UP     Chief Complaint: s/p TSP    History: UOP over the last couple of hours stable, Na yesterday evening stable. No repeat Na/labs this morning thus far.     MEDICATIONS  (STANDING):  chlorhexidine 4% Liquid 1 Application(s) Topical daily  enoxaparin Injectable 40 milliGRAM(s) SubCutaneous <User Schedule>  hydrocortisone 20 milliGRAM(s) Oral <User Schedule>  hydrocortisone 10 milliGRAM(s) Oral <User Schedule>  levothyroxine 50 MICROGram(s) Oral daily  multiple electrolytes Injection Type 1 1000 milliLiter(s) (60 mL/Hr) IV Continuous <Continuous>  multivitamin/minerals 1 Tablet(s) Oral daily  polyethylene glycol 3350 17 Gram(s) Oral two times a day  senna 2 Tablet(s) Oral at bedtime  ziprasidone 40 milliGRAM(s) Oral two times a day    MEDICATIONS  (PRN):  ondansetron Injectable 8 milliGRAM(s) IV Push once PRN Nausea and/or Vomiting  sodium chloride 0.65% Nasal 1 Spray(s) Both Nostrils every 4 hours PRN Congestion      Allergies    No Known Drug Allergies  TSP** SKULL BASE PRECAUTIONS*** (Unknown)  seasonal allergies (Sneezing)  dust (Sneezing)  citrus (Anaphylaxis)    Intolerances        ROS: All other systems reviewed and negative    PHYSICAL EXAM:  VITALS: T(C): 36.3 (05-05-23 @ 07:00)  T(F): 97.4 (05-05-23 @ 07:00), Max: 99.3 (05-05-23 @ 03:00)  HR: 76 (05-05-23 @ 10:00) (62 - 105)  BP: 101/62 (05-05-23 @ 10:00) (101/62 - 122/91)  RR:  (11 - 20)  SpO2:  (91% - 100%)  Wt(kg): --  GENERAL: NAD, resting comfortably   HEENT:  Atraumatic, Normocephalic, moist mucous membranes  RESPIRATORY: Nonlabored respirations on room air, normal rate/effort   CARDIOVASCULAR: Regular rate and rhythm  GI: Soft, nontender, non distended  NEURO: AOx3, moves all extremities spontaneously   PSYCH:  reactive affect, euthymic mood        05-04    144  |  106  |  31<H>  ----------------------------<  173<H>  4.6   |  20<L>  |  1.41<H>    eGFR: 41<L>    Ca    8.7      05-04  Mg     2.0     05-04  Phos  4.1     05-04             ENDOCRINE FOLLOW UP     Chief Complaint: s/p TSP    History: UOP over the last couple of hours stable, Na yesterday evening stable. No repeat Na/labs this morning thus far.   Patient reports she is feeling okay, does not report excessive urination or thirst. Na stable. On NS IVF at 60cc/hr, also drinking to thirst. Urine appears pale yellow.     MEDICATIONS  (STANDING):  chlorhexidine 4% Liquid 1 Application(s) Topical daily  enoxaparin Injectable 40 milliGRAM(s) SubCutaneous <User Schedule>  hydrocortisone 20 milliGRAM(s) Oral <User Schedule>  hydrocortisone 10 milliGRAM(s) Oral <User Schedule>  levothyroxine 50 MICROGram(s) Oral daily  multiple electrolytes Injection Type 1 1000 milliLiter(s) (60 mL/Hr) IV Continuous <Continuous>  multivitamin/minerals 1 Tablet(s) Oral daily  polyethylene glycol 3350 17 Gram(s) Oral two times a day  senna 2 Tablet(s) Oral at bedtime  ziprasidone 40 milliGRAM(s) Oral two times a day    MEDICATIONS  (PRN):  ondansetron Injectable 8 milliGRAM(s) IV Push once PRN Nausea and/or Vomiting  sodium chloride 0.65% Nasal 1 Spray(s) Both Nostrils every 4 hours PRN Congestion      Allergies    No Known Drug Allergies  TSP** SKULL BASE PRECAUTIONS*** (Unknown)  seasonal allergies (Sneezing)  dust (Sneezing)  citrus (Anaphylaxis)    Intolerances        ROS: All other systems reviewed and negative    PHYSICAL EXAM:  VITALS: T(C): 36.3 (05-05-23 @ 07:00)  T(F): 97.4 (05-05-23 @ 07:00), Max: 99.3 (05-05-23 @ 03:00)  HR: 76 (05-05-23 @ 10:00) (62 - 105)  BP: 101/62 (05-05-23 @ 10:00) (101/62 - 122/91)  RR:  (11 - 20)  SpO2:  (91% - 100%)  Wt(kg): --  GENERAL: NAD, resting comfortably   HEENT:  Atraumatic, Normocephalic, moist mucous membranes  RESPIRATORY: Nonlabored respirations on room air, normal rate/effort   CARDIOVASCULAR: Regular rate and rhythm  GI: Soft, nontender, non distended  NEURO: AOx3, moves all extremities spontaneously   PSYCH:  reactive affect, euthymic mood        05-04    144  |  106  |  31<H>  ----------------------------<  173<H>  4.6   |  20<L>  |  1.41<H>    eGFR: 41<L>    Ca    8.7      05-04  Mg     2.0     05-04  Phos  4.1     05-04
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
## 2023-05-05 NOTE — PROVIDER CONTACT NOTE (OTHER) - ACTION/TREATMENT ORDERED:
Place gauze under nose, no packing, look out for CSF leak. Assessed patient at bedside. Gauze checked later and no CSF leak or no complaints of metallic taste in back of mouth.

## 2023-05-05 NOTE — PROVIDER CONTACT NOTE (OTHER) - BACKGROUND
Pt 67yoF, c/o recent visual loss 2/2 pituitary tumor from >1 year ago, s/p 5/4/23 endoscopic transphenoidal resection of tumor. PMH hypothyroidism, psych disorders, lumbar spine stenosis s/p lami.

## 2023-05-05 NOTE — PROGRESS NOTE ADULT - ATTENDING COMMENTS
Agree with above.
Agree with assessment and plan as above by Dr. Leone. Reviewed all pertinent labs, glucose values, and imaging studies. Modifications made as indicated above. 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 Continue with replacement doses of levothyroxine and hydrocortisone as above. Check FT4. Monitor for risk of post-op DI. Monitor urine output, urine osm, and serum, sodium. Follow-up path Outpatient optho follow-up. Can follow-up with Dr. Astrid Blanco upon discharge.
Agree with above.

## 2023-05-05 NOTE — PHYSICAL THERAPY INITIAL EVALUATION ADULT - GAIT DEVIATIONS NOTED, PT EVAL
decreased igor/increased time in double stance/decreased velocity of limb motion/decreased step length

## 2023-05-05 NOTE — PROGRESS NOTE ADULT - ASSESSMENT
s/p TSP for pituitary tumor, saw what looked like inflammed gland, send for path and cultures, hypophysitis    NEURO:  - neuro checks q1h  - repeat CTH AM  - MRI WWO brain  - DI watch, endocrine labs, Cortisol x3d (5/4 - )  - pain control  - pituitary precautions  - c/w psych meds  - behavioral consult    CVS:  - SBP goal 100 -160  - cardene PRN to meet SBP goal    PULM:  - RA, maintain sats >92%  - no IS, pituitary precautions    RENAL:  - Fluids: NS 75cc/hr  - strict IOs    GI:  - Diet: CLD, ADAT  - GI prophylaxis: N/A  - Bowel regimen: colace, senna    ENDO:   - FS goal 120-180  - AM cortisol x3 days (5/4 - )  - endo labs am  - DI watch  - resume home hydrocort  - c/w synthroid  - endo c/s    HEME/ONC:  - SCDs  - Chemoppx: hold due to fresh post op      ID:  - monitor for fevers   s/p TSP for pituitary tumor, saw what looked like inflammed gland, send for path and cultures, hypophysitis    NEURO:  - neuro checks q4h  - repeat CTH today  - MRI WWO brain  - DI watch, endocrine labs, Cortisol x3d (5/4 - )  - pain control  - pituitary precautions  - c/w psych meds  - behavioral consult    CVS:  - SBP goal 100 -160  - cardene PRN to meet SBP goal    PULM:  - RA, maintain sats >92%  - no IS, pituitary precautions    RENAL:  - Fluids: NS 75cc/hr until repeat BMP  - strict IOs  - GENA, trend cr    GI:  - Diet: regular  - GI prophylaxis: pepcid while on ccs  - Bowel regimen: colace, senna    ENDO:   - FS goal 120-180  - AM cortisol x3 days (5/4 - )  - endo labs am  - DI watch  - resume home hydrocort  - c/w synthroid  - endo c/s    HEME/ONC:  - SCDs  - Chemoppx: hold pending imaging      ID:  - monitor for fevers   s/p TSP for pituitary tumor, saw what looked like inflammed gland, send for path and cultures, hypophysitis    NEURO:  - neuro checks q4h  - repeat CTH today  - MRI WWO brain  - DI watch, endocrine labs, Cortisol x3d (5/4 - )  - pain control  - pituitary precautions  - c/w psych meds  - behavioral consult    CVS:  - SBP goal 100 -160      PULM:  - RA, maintain sats >92%  - no IS, pituitary precautions    RENAL:  - Fluids: NS 75cc/hr until repeat BMP  - strict IOs  - GENA, trend cr, repeat bmp this afternoon    GI:  - Diet: regular  - Bowel regimen  - last bm 5/5    ENDO:   - FS goal 120-180  - AM cortisol x3 days (5/4 - )  - DI watch  - resume home hydrocort 20/10 adrenal insuff  - c/w synthroid for hypothyroidism  - endo following    HEME/ONC:  - SCDs  - Chemoppx: restart tonight      ID:  - monitor for fevers    Dispo: floor

## 2023-05-05 NOTE — PHYSICAL THERAPY INITIAL EVALUATION ADULT - BALANCE TRAINING, PT EVAL
GOAL: Pt will demonstrate improved static/dynamic balance to _ in order to improve stability, decrease fall risk, and increase independence in ADLs within 4 weeks.

## 2023-05-05 NOTE — OCCUPATIONAL THERAPY INITIAL EVALUATION ADULT - PERTINENT HX OF CURRENT PROBLEM, REHAB EVAL
68 yo female, PMH hypothyroid, psychiatric disorders with psychosis and hallucination f/u with psychologist and stable on medication,  sinus surgery, septal perforation, chronic sinusitis, lumbar spine stenosis s/p lumbar laminectomy. Pt. reports that she has been losing her vision due to a  pituitary tumor discovered over a year ago. Pt. presents to PST for scheduled Endoscopic Transphenoidal Removal of Pituitary Tumor, Functional endoscopic Sinus Surgery with Image Guidance on 5/4/23. Denies recent fever, chills, chest pain, SOB, changes in bowel/urinary habits or recent exposure to COVID-19 - pt. states she never had COVID infection  POD1 s/p TSP for pituitary tumor, saw what looked like inflamed gland,

## 2023-05-05 NOTE — PHYSICAL THERAPY INITIAL EVALUATION ADULT - PERTINENT HX OF CURRENT PROBLEM, REHAB EVAL
66 yo female, PMH hypothyroid, psychiatric disorders with psychosis and hallucination f/u with psychologist and stable on medication,  sinus surgery, septal perforation, chronic sinusitis, lumbar spine stenosis s/p lumbar laminectomy.   Pt. reports that she has been losing her vision due to a  pituitary tumor discovered over a year ago. Pt. presents for scheduled Endoscopic Transphenoidal Removal of Pituitary Tumor, Functional endoscopic Sinus Surgery with Image Guidance on 5/4/23.

## 2023-05-05 NOTE — OCCUPATIONAL THERAPY INITIAL EVALUATION ADULT - ADDITIONAL COMMENTS
Pt lives in a private house w/ . HHA from 8-5 5 days/week to assist in mobility and ADLs. 1 steps to enter, 1 flight within. Pt ambulates w/ rolling walker and assist. +walk in shower +shower chair. Owns cane.

## 2023-05-05 NOTE — PROGRESS NOTE ADULT - ASSESSMENT
Patient:   ELSA PADILLA Jr            MRN: [id number]            FIN: [id number]               Age:   67 years     Sex:  Male     :  51   Associated Diagnoses:   None   Author:   Jerel Cedeno MD, Raiza Paulson      Basic Information   Time seen: Date & time 17 11:10:00.   History source: Son, EMS.   Arrival mode: Ambulance.   History limitation: None.   Additional information: Chief Complaint from Nursing Triage Note : Chief Complaint   17 11:10           Chief Complaint           resp distress  .      History of Present Illness     Patient is a 67-year-old male with a history of metastatic lung cancer who presents with dyspnea and hypoxia.  Patient was at the rehabilitation facility when they noticed his oxygen was dropping.  He is normally on 2 L nasal cannula.  However they had to bump him up to 5 L nasal cannula.  He remained hypoxic in the low 90s.  His mental status has been diminished and he seems at times difficult to arouse.  Son at the bedside states this has been ongoing for the last 2 weeks.  History is somewhat limited given patient's current mental status.  He will awake and make eye contact but does not answer any questions.  He is denying any chest pain.  Review of systems obtained per son.      Review of Systems     General: No fever.  Skin: No rash.  Eyes: No changes.  ENT: No congestion.  Neck: No swelling.  Respiratory: + Dyspnea.  Cardiac: No chest pain.  Gastrointestinal: No nausea, vomiting.  Urinary: No hematuria.  Musculoskeletal: No deformity.  Neurologic: + Lethargy.         Health Status   Allergies:    Allergic Reactions (Selected)  NKA.      Past Medical/ Family/ Social History     Medical History:   Hypertension  Lung cancer with metastases    Surgical History:  Lung biopsy    Family Hisory:  Noncontributory    Social History:  Illicit drugs: denies  Tobacco: Prior history  Alcohol: denies  Lives in a rehabilitation facility  Son at the bedside  Unemployed          Physical Examination               Vital Signs   Vital Signs   07/17/17 11:45           Temperature Tympanic      99.1 F                             Heart Rate Monitored      123 bpm  HI                             Respiratory Rate          35 br/min  HI                             Systolic Blood Pressure   105 mmHg                             Diastolic Blood Pressure  50 mmHg  LOW                             Mean Arterial Pressure    68 mmHg                             Oxygen Saturation         97 %    07/17/17 11:16           Peripheral Pulse Rate     119 bpm  HI                             Oxygen Saturation         99 %  .     General Appearance: Lethargic, opens eyes to verbal  Skin: No rash, no diaphoresis  HEENT: Normocephalic/atraumatic, sclera anicteric, mucous membranes moist  Neck: Normal range of motion  Chest and Lungs: Respiratory rate 8, poor respiratory effort, diminished air movement diffusely but worse to the left side, no wheezing or crackles  Cardiovascular: Tachycardic, regular rhythm, no murmur  Abdomen: Soft, non-tender  Musculoskeletal: No unilateral edema, erythema, or notable tenderness to palpation  Neurologic: Lethargic, opens eyes to verbal  Psychiatric: Unable to fully assess, cooperative with treatment    Pulse Oximetry Interpretation: 99% on BiPAP which is abnormal for this patient.      Medical Decision Making     Cardiac Monitor Interpretation  Medical indication: To monitor for arrhythmia that may develop during the ED course of stay.   Cardiac monitor: Sinus tachycardia, no ST changes, no ectopy.    EKG Interpretation  Medical indication: Hypotension, hypoxia   12 lead EKG, as interpreted by me, shows sinus tachycardia, rate 118, 1 PAC, no acute ischemic ST or T wave changes, normal intervals. Comparison to prior EKG: Unchanged.     Chest X-Ray Interpretation  Interpretation: One view portable chest x-ray film interpreted by me reveals complete opacification of the left side  of the lungs.  Performed by: Raiza Cedeno MD      Results review:  Lab results : Lab View   07/17/17 12:01           SpO2                      93 %    07/17/17 11:35           Glucose Lvl               187 mg/dL  HI                             BUN                       41 mg/dL  HI                             Creatinine                0.83 mg/dL                             eGFR AfrAmer              >60 mL/min/1.73m2  NA                             eGFR NonAfrAmer           >60 mL/min/1.73m2  NA                             Sodium                    137 mEq/L                             Potassium                 4.7 mEq/L                             Chloride                  101 mEq/L                             TCO2                      27 mEq/L                             AGAP                      14 mEq/L                             Calcium                   8.9 mg/dL                             Alk Phos                  296 unit/L  HI                             Bili Total                1.1 mg/dL  HI                             Total Protein             5.9 g/dL  LOW                             Albumin                   1.6 g/dL  LOW                             Globulin_                 4.3 g/dL  HI                             A/G Ratio_                0.4  NA                             AST/GOT                   40 unit/L  HI                             ALT/GPT                   18 unit/L                             Magnesium Level           1.9 mg/dL                             Lactic Acid               2.1 mmol/L                             Troponin I                0.02 ng/mL                             BNP                       131 pg/mL  HI                             TSH                       1.721 mIU/mL                             WBC                       46.2 K/cumm  HI                             RBC                       2.74 M/cumm  LOW                             Hgb                        7.4 g/dL  LOW                             Hct                       24 %  LOW                             MCV                       86 FL                             MCH                       27 pg                             MCHC                      31 g/dL  LOW                             RDW                       18.9 %  HI                             Platelet                  214 K/cumm                             NRBC                      0.0 %                             Segs Man                  74 %  NA                             Band Man                  17 %  HI                             Lymph Man                 3 %  NA                             Total Lymphs Manual       3 %  NA                             Monocyte Man              6 %  NA                             Eos Man                   0 %                             Basophil Man              0 %                             Abs Neut Man              42.0 K/cumm  HI                             Abs Lymph Man             1.4 K/cumm                             Abs Mono Man              2.8 K/cumm  HI                             RBC Morph                 Anisocytosis                             RBC Morph2                Polychromasia                             WBC Morph                 Toxic Gran 1+                             Plt Estimate              Adequate                             PT                        14.1 seconds  HI                             INR                       1.3  HI                             PTT                       25 seconds                             pH                        7.435                             pCO2                      39.1 mmHg                             pO2                       318.1 mmHg  CRIT                             HCO3                      25.7 mmol/L                             BEvt                      1.4 mmol/L                             ABG Na                    133.6 mmol/L  LOW                              ABG K                     4.47 mmol/L                             ABG Ion Calcium           4.8 mg/dL                             ABG Glu                   185 mg/g  HI                             tHb                       8.0 g/dL  LOW                             O2 Sat(est)               99.3 %                             O2Hb                      98.2 %  HI                             COHb                      0.9 %                             MetHb                     0.2 %                             ABG Hct                   24 %  LOW                             Anam's Test              Positive                             Draw Site                 R Radial                             Sample Type               Arterial                             Mode                      BiPap/CPAP                             FiO2                      100.0  NA                             Set Resp Rate             10 br/min  NA                             PS                        12 cmH20  NA                             CPAP                      6.0 cmH20  NA                             Patient Resp Rate         23 br/min  NA    07/16/17 17:05           WBC                       46.0 K/cumm  HI                             RBC                       2.67 M/cumm  LOW                             Hgb                       7.2 g/dL  LOW                             Hct                       24 %  LOW                             MCV                       88 FL                             MCH                       27 pg                             MCHC                      31 g/dL  LOW                             RDW                       18.7 %  HI                             Platelet                  229 K/cumm                             NRBC                      0.0 %                             Segs Man                  79 %  NA                             Band Man                  16 %  HI             66 yo female with hx of psychiatric disorders with psychosis and hallucination f/u with psychologist and stable on medication, sinus surgery, septal perforation, chronic sinusitis, lumbar spine stenosis s/p lumbar laminectomy, b/l optic atrophy, pituitary tumor found in 2022 now s/p  TSP on 5/4. Endocrinology consulted for assistance and management of hypophysitis.     #Enlarged pituitary gland s/p TSP  macroadenoma/tumor? 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 concern for inflamed gland?  #secondary hypothyroidism   #secondary AI   #DI watch   MR Brain Stereotactic w/wo IV Cont (04.26.23 @ 20:55: The pituitary gland and the infundibulum are enlarged, in total measuring   1.3 x 1.5 x 1.9 cm; Heterogeneous, predominantly peripheral enhancement of the pituitary gland, with central region of nonenhancing T2 hyperintense signal. Findings could represent the presence of pituitary apoplexy. No optic chiasm compression. No cavernous sinus invasion.  Following with Dr. Astrid Boyd as outpatient - on LT4 daily- 75mcg? and HC 10mg BID at home, started after preop hormonal evaluation   Per review of anesthesia record, received HC 100mg x 1 dose 5/4 AM perioperatively   Recommendations:   - f/u surgical path report   - Continue LT4 50mcg PO daily, to be taken on an empty stomach at least 30 mins prior to food/other meds   - Continue HC 20mg PO q8AM, 10mg PO q3PM   - repeat BMP at least daily   - DI Watch: Please monitor strict I/O, sodium levels q8hrs (monitor for hypo or hypernatremia). Goal Na 140-145  If urine output more than 500ml/h or 250ml/h for 2 consecutive hours get stat Na, if Na is increasing again compared to prior than bolus 1/2 NS to match half the output (for example, if net negative 2Liters can give 1Liter 1/2NS if she is unable to keep up with output with PO intake)  - Please inform endocrine if DI watch is positive and Na is increasing   - If Na > increases to 140 or higher would dose DDAVP 0.05mg and continue DI watch as above   - Note if Na continues to increase despite a a DDAVP dose please inform endocrine   - Please make sure pt has access to water and encourage her to drink to thirst   DC Planning: Likely on HC, LT4. TBD if patient will require DDAVP. Outpatient endocrinology follow up with Dr. Astrid Blanco on discharge. Patient should also follow up with Neurosurgery & neuroophthalmology after discharge.       Zahida Leone DO   Endocrine Fellow  For follow up questions, discharge recommendations, or new consults please call answering service at [phone] (weekdays), [phone] (nights/weekends). For nonurgent matters, please email [email or web address] or [email or web address]                         Lymph Man                 3 %  NA                             Total Lymphs Manual       3 %  NA                             Monocyte Man              2 %  NA                             Eos Man                   0 %                             Basophil Man              0 %                             Abs Neut Man              43.7 K/cumm  HI                             Abs Lymph Man             1.4 K/cumm                             Abs Mono Man              0.9 K/cumm  HI                             RBC Morph                 Anisocytosis                             RBC Morph2                Poikilocytosis                             RBC Morph3                Hypochromasia                             RBC Morph4                Polychromasia                             WBC Morph                 Toxic Gran 2+                             Plt Estimate              Adequate  .      Reexamination/ Reevaluation       Patient is a 67-year-old male with a history of metastatic lung cancer who presents with dyspnea, and hypoxia.  Patient was referred from the rehabilitation facility after he was found to be lethargic and hypoxic on his normal 2 L nasal cannula.  He is placed on 5 L nasal cannula in route and was satting low 90s.  Upon arrival to the emergency department patient was very lethargic, breathing at a very low respiratory rate with poor effort and oxygen of 89% on room air.  The patient was placed on BiPAP to assist in his respirations and ventilation.  Son is at the bedside.  Discussion was had with the son regarding the patient's end-of-life wishes given his metastatic cancer concerning appearance on arrival.  They initially stated that he was at this time a full code.  Later after further discussion with the  and myself, they subsequently decided to change his status to DNR.  Patient improved on the BiPAP and his mental status also subsequently improved.  He did request to have the  BiPAP removed.  He still remains intermittently lethargic and there is a concern for aspiration.  His chest x-ray shows complete opacification of the left side of his lungs consistent with prior imaging given his history of cancer.  No acute consolidations are present, he has no fevers, chronic unchanged leukocytosis.  Discussed the case with the admitting physician and agreed to hold off on antibiotics at this time.  Patient is also at high risk for pulmonary embolism given his active cancer.  However given his respiratory status and mental status at this time, and after discussion with the admitting physician, was also agreed to hold off on further evaluation for pulmonary embolism at this time.  Discussed the case with Dr. Kovacs at the request of the admitting physician.  He was also discussed that given his waxing and waning mental status, patient will likely need CT head imaging however will hold off given his current status.  They state that given the patient's DNR status, he does not need ICU at this time.  Admitting doctor was notified of his opinion.  Patient to be admitted to the oncology floor with tele monitoring in guarded condition.     Raiza Cedeno MD         Procedure     Critical Care Documentation  I spent this amount of total time engaged in work directly related to patient care not including any separately billable procedures: 30 minutes.  Critical condition(s) addressed for impending deterioration: lung CA, altered mental status  Associated risk factors: airway protection, potential aspiration, ventilatory failure, hemodynamic instability  Management: Bedside assessment, interpretation of imaging and laboratory studies (EKG, telemetry, pulse oximetry, chest x-ray, labs), interventions (hemodyamic monitoring and support, intravenous medications), review of available records and discussion with consultants.  This is a critically ill or injured patient with impairment of one or more vital  organ systems. This patient has a high probability of life-threatening deterioration and requires complex decision making to assess, manipulate and support vital organ system functions.    End-of-life discussion  A lengthy discussion was had with the patient and/or family members at bedside regarding the patient's end-of-life wishes.  Total time: 20 Minutes  Patient and/or family at bedside were given the opportunity to decline discussion  Preference for chest compressions/CPR, endotracheal intubation and mechanical ventilation, and other invasive and/or resuscitative measures.  Appropriate DNR/LET documentations were completed for documentation if applicable.         Impression and Plan   Diagnosis   1. Hypoxia  2. Poor respiratory effort  3. Altered mental status   Plan   Condition: Guarded.    Disposition: Admit to Inpatient Telemetry Unit.    Counseled: Patient, Regarding diagnosis, Regarding diagnostic results, Regarding treatment plan.         66 yo female with hx of psychiatric disorders with psychosis and hallucination f/u with psychologist and stable on medication, sinus surgery, septal perforation, chronic sinusitis, lumbar spine stenosis s/p lumbar laminectomy, b/l optic atrophy, pituitary tumor found in 2022 now s/p  TSP on 5/4. Endocrinology consulted for assistance and management of hypophysitis.     #Enlarged pituitary gland s/p TSP  macroadenoma/tumor? 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 concern for inflamed gland?  #secondary hypothyroidism   #secondary AI   #DI watch   MR Brain Stereotactic w/wo IV Cont (04.26.23 @ 20:55: The pituitary gland and the infundibulum are enlarged, in total measuring   1.3 x 1.5 x 1.9 cm; Heterogeneous, predominantly peripheral enhancement of the pituitary gland, with central region of nonenhancing T2 hyperintense signal. Findings could represent the presence of pituitary apoplexy. No optic chiasm compression. No cavernous sinus invasion.  Following with Dr. Astrid Boyd as outpatient - on LT4 daily- 75mcg? and HC 10mg BID at home, started after preop hormonal evaluation   Per review of anesthesia record, received HC 100mg x 1 dose 5/4 AM perioperatively   Recommendations:   - f/u surgical path report   - Continue LT4 50mcg PO daily, to be taken on an empty stomach at least 30 mins prior to food/other meds   - check free thyroxine level tomorrow morning (ordered)   - Continue HC 20mg PO q8AM, 10mg PO q3PM >> If blood pressures persistently elevated or if BG trending up, may consider tapering to 10mg PO q8am and 5mg PO q3PM   - repeat BMP at least daily   - DI Watch: Please monitor strict I/O, sodium levels q8hrs (monitor for hypo or hypernatremia). Goal Na 140-145  If urine output more than 500ml/h or 250ml/h for 2 consecutive hours get stat Na, if Na is increasing again compared to prior than bolus 1/2 NS to match half the output (for example, if net negative 2Liters can give 1Liter 1/2NS if she is unable to keep up with output with PO intake)  - Please inform endocrine if DI watch is positive and Na is increasing   - If Na > increases to 140 or higher would dose DDAVP 0.05mg and continue DI watch as above   - Note if Na continues to increase despite a a DDAVP dose please inform endocrine   - Please make sure pt has access to water and encourage her to drink to thirst   DC Planning: Likely on HC, LT4. TBD if patient will require DDAVP. Outpatient endocrinology follow up with Dr. Astrid Blanco on discharge. Patient should also follow up with Neurosurgery & neuroophthalmology after discharge.       Zahida Leone,    Endocrine Fellow  For follow up questions, discharge recommendations, or new consults please call answering service at [phone] (weekdays), [phone] (nights/weekends). For nonurgent matters, please email [email or web address] or [email or web address]

## 2023-05-05 NOTE — PHYSICAL THERAPY INITIAL EVALUATION ADULT - MANUAL MUSCLE TESTING RESULTS, REHAB EVAL
BUE grossly good; bilat hip flexors fair+; bilat knee extensors good; bilat ankle plantarflexors fair-/grossly assessed due to

## 2023-05-05 NOTE — PROGRESS NOTE ADULT - SUBJECTIVE AND OBJECTIVE BOX
NSCU Progress Note    Assessment/Hospital Course:    68 yo female, PMH hypothyroid, psychiatric disorders with psychosis and hallucination f/u with psychologist and stable on medication,  sinus surgery, septal perforation, chronic sinusitis, lumbar spine stenosis s/p lumbar laminectomy. Pt. reports that she has been losing her vision due to a  pituitary tumor discovered over a year ago. Pt. presents to PST for scheduled Endoscopic Transphenoidal Removal of Pituitary Tumor, Functional endoscopic Sinus Surgery with Image Guidance on 5/4/23. Denies recent fever, chills, chest pain, SOB, changes in bowel/urinary habits or recent exposure to COVID-19 - pt. states she never had COVID infection      24 Hour Events/Subjective:  - POD0 s/p TSP for pituitary tumor, saw what looked like inflammed gland, send for path and cultures, hypophysitis  -       REVIEW OF SYSTEMS:  - negative except as above    VITALS:   -reviewed      IMAGING/DATA:   - Reviewed          PHYSICAL EXAM:    General: calm  CVS: RRR  Pulm: CTAB  GI: Soft, NTND  Extremities: No LE Edema  Neuro: AOx3, PERRL, non-copmpliant with vsual exam though appears to attend to both sides, +BTT "Sees shadows and intermittently counts fingers, + light perception in Lt,, facial symmetrical, fluent speech, motor 5/5 throughout, no PND   NSCU Progress Note    Assessment/Hospital Course:    66 yo female, PMH hypothyroid, psychiatric disorders with psychosis and hallucination f/u with psychologist and stable on medication,  sinus surgery, septal perforation, chronic sinusitis, lumbar spine stenosis s/p lumbar laminectomy. Pt. reports that she has been losing her vision due to a  pituitary tumor discovered over a year ago. Pt. presents to PST for scheduled Endoscopic Transphenoidal Removal of Pituitary Tumor, Functional endoscopic Sinus Surgery with Image Guidance on 5/4/23. Denies recent fever, chills, chest pain, SOB, changes in bowel/urinary habits or recent exposure to COVID-19 - pt. states she never had COVID infection      24 Hour Events/Subjective:  - POD1 s/p TSP for pituitary tumor, saw what looked like inflammed gland, send for path and cultures, hypophysitis  - denies metallic taste, leaking  - UOP ~250cc/h, drinking to thirst, na 143>144  - padilla on labs, will monitor for worsening renal function      REVIEW OF SYSTEMS:  - negative except as above    VITALS:   -reviewed      IMAGING/DATA:   - Reviewed          PHYSICAL EXAM:    General: calm  CVS: RRR  Pulm: CTAB  GI: Soft, NTND  Extremities: No LE Edema  Neuro: Awake, alert, fully oriented, follows commands, PERRL, BTT b/l, +light perception, EOMI, face symmetric, no drift, full strength   NSCU Progress Note    Assessment/Hospital Course:    66 yo female, PMH hypothyroid, psychiatric disorders with psychosis and hallucination f/u with psychologist and stable on medication,  sinus surgery, septal perforation, chronic sinusitis, lumbar spine stenosis s/p lumbar laminectomy. Pt. reports that she has been losing her vision due to a  pituitary tumor discovered over a year ago. Pt. presents to PST for scheduled Endoscopic Transphenoidal Removal of Pituitary Tumor, Functional endoscopic Sinus Surgery with Image Guidance on 5/4/23. Denies recent fever, chills, chest pain, SOB, changes in bowel/urinary habits or recent exposure to COVID-19 - pt. states she never had COVID infection      24 Hour Events/Subjective:  - POD1 s/p TSP for pituitary tumor, saw what looked like inflamed gland, send for path and cultures, hypophysitis  - denies metallic taste, leaking  - UOP ~250cc/h, drinking to thirst, na 143>144  - padilla on labs, will monitor for worsening renal function      REVIEW OF SYSTEMS:  - negative except as above    VITALS:   -reviewed      IMAGING/DATA:   - Reviewed          PHYSICAL EXAM:    General: calm  CVS: RRR  Pulm: CTAB  GI: Soft, NTND  Extremities: No LE Edema  Neuro: Awake, alert, fully oriented, follows commands, PERRL, BTT b/l, +light perception, EOMI, face symmetric, no drift, full strength

## 2023-05-05 NOTE — OCCUPATIONAL THERAPY INITIAL EVALUATION ADULT - VISUAL ACUITY
Anesthesia Pre Eval Note    Anesthesia ROS/Med Hx        Anesthetic Complication History:  Patient does not have a history of anesthetic complications      Pulmonary Review:  Patient does not have a pulmonary history      Neuro/Psych Review:  Patient does not have a neuro/psych history       Cardiovascular Review:  Exercise tolerance: good (>4 METS)    GI/HEPATIC/RENAL Review:  Patient does not have a GI/hepatic/renalhistory       End/Other Review:    Positive for obesity class I - 30.00 - 34.99  Positive for arthritis      Relevant Problems   No relevant active problems       Physical Exam     Airway   Mallampati: II  TM Distance: >3 FB  Neck ROM: Full  Neck: Able to place in sniff position  TMJ Mobility: Good    Cardiovascular  Cardiovascular exam normal    General Assessment  General Assessment: Alert and oriented and No acute distress    Dental Exam  Dental exam normal    Pulmonary Exam  Pulmonary exam normal    Abdominal Exam    Patient Demonstrates:  Obese      Anesthesia Plan    ASA Status: 2    Anesthesia Type: MAC    Induction: Intravenous  Preferred Airway Type: ETT  Premedication: Oral and IV      Risks Discussed: Nausea, Vomiting, Headache, Sore Throat, Dental Injury and Nerve Injury    Post-op Pain Management: Single Shot Block      Checklist  Reviewed: Lab Results, Patient Summary, Allergies, Past Med History, Medications, Problem list, NPO Status and Nursing Notes    Informed Consent  The proposed anesthetic plan, including its risks and benefits, have been discussed with the Patient  - along with the risks and benefits of alternatives.  Questions were encouraged and answered and the patient and/or representative understands and agrees to proceed.     Blood Products: Not Anticipated    
pt unable to see out of R eye. Reports blurriness out of L eye./not tested

## 2023-05-06 ENCOUNTER — TRANSCRIPTION ENCOUNTER (OUTPATIENT)
Age: 68
End: 2023-05-06

## 2023-05-06 VITALS
DIASTOLIC BLOOD PRESSURE: 59 MMHG | TEMPERATURE: 98 F | OXYGEN SATURATION: 96 % | HEART RATE: 96 BPM | SYSTOLIC BLOOD PRESSURE: 99 MMHG | RESPIRATION RATE: 18 BRPM

## 2023-05-06 LAB
ANION GAP SERPL CALC-SCNC: 14 MMOL/L — SIGNIFICANT CHANGE UP (ref 5–17)
BUN SERPL-MCNC: 24 MG/DL — HIGH (ref 7–23)
CALCIUM SERPL-MCNC: 9 MG/DL — SIGNIFICANT CHANGE UP (ref 8.4–10.5)
CHLORIDE SERPL-SCNC: 108 MMOL/L — SIGNIFICANT CHANGE UP (ref 96–108)
CO2 SERPL-SCNC: 22 MMOL/L — SIGNIFICANT CHANGE UP (ref 22–31)
CREAT SERPL-MCNC: 0.86 MG/DL — SIGNIFICANT CHANGE UP (ref 0.5–1.3)
EGFR: 74 ML/MIN/1.73M2 — SIGNIFICANT CHANGE UP
GLUCOSE SERPL-MCNC: 125 MG/DL — HIGH (ref 70–99)
HCT VFR BLD CALC: 32.2 % — LOW (ref 34.5–45)
HGB BLD-MCNC: 10.3 G/DL — LOW (ref 11.5–15.5)
MAGNESIUM SERPL-MCNC: 2.2 MG/DL — SIGNIFICANT CHANGE UP (ref 1.6–2.6)
MCHC RBC-ENTMCNC: 31.1 PG — SIGNIFICANT CHANGE UP (ref 27–34)
MCHC RBC-ENTMCNC: 32 GM/DL — SIGNIFICANT CHANGE UP (ref 32–36)
MCV RBC AUTO: 97.3 FL — SIGNIFICANT CHANGE UP (ref 80–100)
NRBC # BLD: 0 /100 WBCS — SIGNIFICANT CHANGE UP (ref 0–0)
PHOSPHATE SERPL-MCNC: 2.8 MG/DL — SIGNIFICANT CHANGE UP (ref 2.5–4.5)
PLATELET # BLD AUTO: 293 K/UL — SIGNIFICANT CHANGE UP (ref 150–400)
POTASSIUM SERPL-MCNC: 4.1 MMOL/L — SIGNIFICANT CHANGE UP (ref 3.5–5.3)
POTASSIUM SERPL-SCNC: 4.1 MMOL/L — SIGNIFICANT CHANGE UP (ref 3.5–5.3)
RBC # BLD: 3.31 M/UL — LOW (ref 3.8–5.2)
RBC # FLD: 16.3 % — HIGH (ref 10.3–14.5)
SODIUM SERPL-SCNC: 144 MMOL/L — SIGNIFICANT CHANGE UP (ref 135–145)
T4 FREE SERPL-MCNC: 1.2 NG/DL — SIGNIFICANT CHANGE UP (ref 0.9–1.8)
WBC # BLD: 12.76 K/UL — HIGH (ref 3.8–10.5)
WBC # FLD AUTO: 12.76 K/UL — HIGH (ref 3.8–10.5)

## 2023-05-06 PROCEDURE — 88341 IMHCHEM/IMCYTCHM EA ADD ANTB: CPT

## 2023-05-06 PROCEDURE — 83605 ASSAY OF LACTIC ACID: CPT

## 2023-05-06 PROCEDURE — 88312 SPECIAL STAINS GROUP 1: CPT

## 2023-05-06 PROCEDURE — 88305 TISSUE EXAM BY PATHOLOGIST: CPT

## 2023-05-06 PROCEDURE — C1889: CPT

## 2023-05-06 PROCEDURE — 84100 ASSAY OF PHOSPHORUS: CPT

## 2023-05-06 PROCEDURE — 85018 HEMOGLOBIN: CPT

## 2023-05-06 PROCEDURE — A9585: CPT

## 2023-05-06 PROCEDURE — C9399: CPT

## 2023-05-06 PROCEDURE — 93005 ELECTROCARDIOGRAM TRACING: CPT

## 2023-05-06 PROCEDURE — 84439 ASSAY OF FREE THYROXINE: CPT

## 2023-05-06 PROCEDURE — 36415 COLL VENOUS BLD VENIPUNCTURE: CPT

## 2023-05-06 PROCEDURE — 88313 SPECIAL STAINS GROUP 2: CPT

## 2023-05-06 PROCEDURE — 93970 EXTREMITY STUDY: CPT

## 2023-05-06 PROCEDURE — 82947 ASSAY GLUCOSE BLOOD QUANT: CPT

## 2023-05-06 PROCEDURE — 87075 CULTR BACTERIA EXCEPT BLOOD: CPT

## 2023-05-06 PROCEDURE — 87070 CULTURE OTHR SPECIMN AEROBIC: CPT

## 2023-05-06 PROCEDURE — 84300 ASSAY OF URINE SODIUM: CPT

## 2023-05-06 PROCEDURE — C1713: CPT

## 2023-05-06 PROCEDURE — 85014 HEMATOCRIT: CPT

## 2023-05-06 PROCEDURE — 88360 TUMOR IMMUNOHISTOCHEM/MANUAL: CPT

## 2023-05-06 PROCEDURE — 83735 ASSAY OF MAGNESIUM: CPT

## 2023-05-06 PROCEDURE — 82010 KETONE BODYS QUAN: CPT

## 2023-05-06 PROCEDURE — 82330 ASSAY OF CALCIUM: CPT

## 2023-05-06 PROCEDURE — 82803 BLOOD GASES ANY COMBINATION: CPT

## 2023-05-06 PROCEDURE — 97165 OT EVAL LOW COMPLEX 30 MIN: CPT

## 2023-05-06 PROCEDURE — 85027 COMPLETE CBC AUTOMATED: CPT

## 2023-05-06 PROCEDURE — 80048 BASIC METABOLIC PNL TOTAL CA: CPT

## 2023-05-06 PROCEDURE — 84295 ASSAY OF SERUM SODIUM: CPT

## 2023-05-06 PROCEDURE — 87102 FUNGUS ISOLATION CULTURE: CPT

## 2023-05-06 PROCEDURE — 88342 IMHCHEM/IMCYTCHM 1ST ANTB: CPT

## 2023-05-06 PROCEDURE — 82570 ASSAY OF URINE CREATININE: CPT

## 2023-05-06 PROCEDURE — 82435 ASSAY OF BLOOD CHLORIDE: CPT

## 2023-05-06 PROCEDURE — 97162 PT EVAL MOD COMPLEX 30 MIN: CPT

## 2023-05-06 PROCEDURE — 70553 MRI BRAIN STEM W/O & W/DYE: CPT

## 2023-05-06 PROCEDURE — 84132 ASSAY OF SERUM POTASSIUM: CPT

## 2023-05-06 RX ORDER — ACETAMINOPHEN 500 MG
2 TABLET ORAL
Qty: 0 | Refills: 0 | DISCHARGE
Start: 2023-05-06

## 2023-05-06 RX ORDER — HYDROCORTISONE 20 MG
2 TABLET ORAL
Qty: 90 | Refills: 0
Start: 2023-05-06

## 2023-05-06 RX ORDER — SENNA PLUS 8.6 MG/1
2 TABLET ORAL
Qty: 0 | Refills: 0 | DISCHARGE
Start: 2023-05-06

## 2023-05-06 RX ORDER — ACETAMINOPHEN 500 MG
650 TABLET ORAL EVERY 6 HOURS
Refills: 0 | Status: DISCONTINUED | OUTPATIENT
Start: 2023-05-06 | End: 2023-05-06

## 2023-05-06 RX ORDER — HYDROCORTISONE 20 MG
1 TABLET ORAL
Refills: 0 | DISCHARGE

## 2023-05-06 RX ORDER — SODIUM CHLORIDE 0.65 %
1 AEROSOL, SPRAY (ML) NASAL
Refills: 0 | Status: DISCONTINUED | OUTPATIENT
Start: 2023-05-06 | End: 2023-05-06

## 2023-05-06 RX ORDER — SODIUM CHLORIDE 0.65 %
1 AEROSOL, SPRAY (ML) NASAL
Qty: 0 | Refills: 0 | DISCHARGE
Start: 2023-05-06

## 2023-05-06 RX ADMIN — SODIUM CHLORIDE 60 MILLILITER(S): 9 INJECTION, SOLUTION INTRAVENOUS at 05:47

## 2023-05-06 RX ADMIN — ZIPRASIDONE HYDROCHLORIDE 40 MILLIGRAM(S): 20 CAPSULE ORAL at 06:16

## 2023-05-06 RX ADMIN — Medication 50 MICROGRAM(S): at 05:45

## 2023-05-06 RX ADMIN — Medication 1 TABLET(S): at 11:19

## 2023-05-06 RX ADMIN — Medication 20 MILLIGRAM(S): at 07:56

## 2023-05-06 RX ADMIN — Medication 10 MILLIGRAM(S): at 16:40

## 2023-05-06 RX ADMIN — Medication 1 SPRAY(S): at 11:19

## 2023-05-06 NOTE — PROGRESS NOTE ADULT - ASSESSMENT
Discussed with patient and family wound care,  updated at bedside, follow up plans, activity, and medications. Questions answered, and they verbalized understanding.   d/c IVL and d/c home today  RXs sent to pharmacy    discussed with Dr Rust  [id number]

## 2023-05-06 NOTE — DISCHARGE NOTE PROVIDER - NSDCMRMEDTOKEN_GEN_ALL_CORE_FT
acetaminophen 325 mg oral tablet: 2 tab(s) orally every 6 hours As needed Temp greater or equal to 38C (100.4F), Mild Pain (1 - 3)  hydrocortisone 10 mg oral tablet: 2 tab(s) orally once a day (in the morning) AND 1 tab orally daily @ 3pm  levothyroxine 50 mcg (0.05 mg) oral tablet: 1 tab(s) orally once a day  Ocuvite oral tablet: 1 tab(s) orally once a day  senna leaf extract oral tablet: 2 tab(s) orally once a day (at bedtime)  sodium chloride 0.65% nasal spray: 1 spray(s) nasal 4 times a day until jose juan med nasal rinse obtained  Tumeric: 1 tab oral daily  ziprasidone 40 mg oral capsule: 1 orally 2 times a day

## 2023-05-06 NOTE — DISCHARGE NOTE NURSING/CASE MANAGEMENT/SOCIAL WORK - NSDCFUADDAPPT_GEN_ALL_CORE_FT
Please follow up with the following physicians within 1-2 weeks:  1- Dr Rust- neurosurgeon  2- Dr Hernadez- ENT  3- Dr Astrid Blanco- endocrinology  4- Dr Jeffries- psychiatry  5- PCP  6- Dr Land- neuro- ophthalmology    Please have chemistry checked in 1 week. (sodium levels)     Please avoid nasal trauma, do not blow your nose, use straws or incentive spirometers.  DO NOT HAVE COVID NASAL SWABS PERFORMED AS THIS COULD LEAD TO CSF LEAK. If you notice nasal drainage, post nasal drainage, or metallic/salty taste please notify your neurosurgeon and/or ENT immediately.     People who have adrenal insufficiency  must take hormones to replace the hormones they don't have enough of. But if they get sick, get into an accident, or have surgery, the hormones they normally take are not  enough & have to take extra medicine when they are sick or when they know they are going to have surgery. Double your steroid ( hydrocortisone) dose x 48 hrs If not better in 48 hrs continue double dose & call endocrinologist for further advise. Will need IV stress dose steroids prior to surgery

## 2023-05-06 NOTE — DISCHARGE NOTE NURSING/CASE MANAGEMENT/SOCIAL WORK - PATIENT PORTAL LINK FT
You can access the FollowMyHealth Patient Portal offered by [facility] by registering at the following website: [email or web address]. By joining [facility]’s FollowMyHealth portal, you will also be able to view your health information using other applications (apps) compatible with our system.

## 2023-05-06 NOTE — DISCHARGE NOTE PROVIDER - CARE PROVIDERS DIRECT ADDRESSES
[email or web address]

## 2023-05-06 NOTE — DISCHARGE NOTE PROVIDER - PROVIDER TOKENS
PROVIDER:[TOKEN:[[id number]]],PROVIDER:[TOKEN:[[id number]]] PROVIDER:[TOKEN:[[id number]]],PROVIDER:[TOKEN:[[id number]]],PROVIDER:[TOKEN:[[id number]]]

## 2023-05-06 NOTE — DISCHARGE NOTE PROVIDER - NSDCCPCAREPLAN_GEN_ALL_CORE_FT
PRINCIPAL DISCHARGE DIAGNOSIS  Diagnosis: Pituitary mass  Assessment and Plan of Treatment: 5/4/23 s/p Transphenoidal for pituitary tumor resection with ENT.      SECONDARY DISCHARGE DIAGNOSES  Diagnosis: H/O psychosis  Assessment and Plan of Treatment: Please continue medications and follow up with your primary care physician and psychiatrist within 1-2 weeks.    Diagnosis: Secondary adrenal insufficiency  Assessment and Plan of Treatment: Please continue Hydrocortisone 20mg in am and 10mg in pm and follow up with endocrinologist Dr Blanco in 1 week for chemistry (sodium) lab work.      Diagnosis: Secondary hypothyroidism  Assessment and Plan of Treatment: Please continue levothyroxine daily and follow up with endocrinologist Dr Blanco.

## 2023-05-06 NOTE — PROGRESS NOTE ADULT - ASSESSMENT
68 yo female with hx of psychiatric disorders with psychosis and hallucination f/u with psychologist and stable on medication, sinus surgery, septal perforation, chronic sinusitis, lumbar spine stenosis s/p lumbar laminectomy, b/l optic atrophy, pituitary tumor found in 2022 now s/p  TSP on 5/4. Endocrinology consulted for assistance and management of hypophysitis.     #Enlarged pituitary gland s/p TSP  macroadenoma/tumor? 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 concern for inflamed gland?  #secondary hypothyroidism   #secondary AI   #DI watch   MR Brain Stereotactic w/wo IV Cont (04.26.23 @ 20:55: The pituitary gland and the infundibulum are enlarged, in total measuring   1.3 x 1.5 x 1.9 cm; Heterogeneous, predominantly peripheral enhancement of the pituitary gland, with central region of nonenhancing T2 hyperintense signal. Findings could represent the presence of pituitary apoplexy. No optic chiasm compression. No cavernous sinus invasion.  Following with Dr. Astrid Boyd as outpatient - on LT4 daily- 75mcg? and HC 10mg BID at home, started after preop hormonal evaluation   Per review of anesthesia record, received HC 100mg x 1 dose 5/4 AM perioperatively   Recommendations:   - f/u surgical path report   - Continue LT4 50mcg PO daily, to be taken on an empty stomach at least 30 mins prior to food/other meds   - f/u free thyroxine level   - Continue HC 20mg PO q8AM, 10mg PO q3PM for now >> If blood pressures persistently elevated or if BG trending up, may consider tapering to 10mg PO q8am and 5mg PO q3PM   - repeat BMP at least daily   - DI Watch: Please monitor strict I/O, sodium levels q8hrs (monitor for hypo or hypernatremia). Goal Na 140-145  If urine output more than 500ml/h or 250ml/h for 2 consecutive hours get stat Na, if Na is increasing again compared to prior than bolus 1/2 NS to match half the output (for example, if net negative 2Liters can give 1Liter 1/2NS if she is unable to keep up with output with PO intake)  - Please inform endocrine if DI watch is positive and Na is increasing   - If Na > increases to 140 or higher would dose DDAVP 0.05mg and continue DI watch as above   - Note if Na continues to increase despite a a DDAVP dose please inform endocrine   - Please make sure pt has access to water and encourage her to drink to thirst   DC Planning: Likely on HC, LT4. Patient will need repeat labs in 1 week from discharge to monitor sodium. Outpatient endocrinology follow up with Dr. Astrid Blanco on discharge. Patient should also follow up with Neurosurgery & neuroophthalmology after discharge.       Zahida Leone, DO   Endocrine Fellow  For follow up questions, discharge recommendations, or new consults please call answering service at [phone] (weekdays), [phone] (nights/weekends). For nonurgent matters, please email [email or web address] or [email or web address]

## 2023-05-06 NOTE — PROGRESS NOTE ADULT - SUBJECTIVE AND OBJECTIVE BOX
ENDOCRINE FOLLOW UP     Chief Complaint: s/p TSP     History: Patient reports she feels fine, wants to go home. Aid at bedside. Does not report excessive urination or thirst. Rn also confirms this. Na this morning stable at 144. FT4 pending.     MEDICATIONS  (STANDING):  enoxaparin Injectable 40 milliGRAM(s) SubCutaneous <User Schedule>  hydrocortisone 20 milliGRAM(s) Oral <User Schedule>  hydrocortisone 10 milliGRAM(s) Oral <User Schedule>  levothyroxine 50 MICROGram(s) Oral daily  multivitamin/minerals 1 Tablet(s) Oral daily  polyethylene glycol 3350 17 Gram(s) Oral two times a day  senna 2 Tablet(s) Oral at bedtime  sodium chloride 0.65% Nasal 1 Spray(s) Both Nostrils four times a day  ziprasidone 40 milliGRAM(s) Oral two times a day    MEDICATIONS  (PRN):  acetaminophen     Tablet .. 650 milliGRAM(s) Oral every 6 hours PRN Temp greater or equal to 38C (100.4F), Mild Pain (1 - 3)  OLANZapine 2.5 milliGRAM(s) Oral every 6 hours PRN for agitation  ondansetron Injectable 8 milliGRAM(s) IV Push once PRN Nausea and/or Vomiting      Allergies    No Known Drug Allergies  TSP** SKULL BASE PRECAUTIONS*** (Unknown)  seasonal allergies (Sneezing)  dust (Sneezing)  citrus (Anaphylaxis)    Intolerances        ROS: All other systems reviewed and negative    PHYSICAL EXAM:  VITALS: T(C): 36.6 (05-06-23 @ 08:41)  T(F): 97.9 (05-06-23 @ 08:41), Max: 98.6 (05-05-23 @ 17:00)  HR: 86 (05-06-23 @ 08:41) (71 - 101)  BP: 106/66 (05-06-23 @ 08:41) (105/67 - 154/89)  RR:  (14 - 19)  SpO2:  (95% - 98%)  Wt(kg): --  GENERAL: NAD, resting comfortably   EYES: No proptosis,  anicteric  HEENT:  Atraumatic, Normocephalic, moist mucous membranes  RESPIRATORY: Nonlabored respirations on room air, normal rate/effort   CARDIOVASCULAR: Regular rate and rhythm  NEURO: AOx3, moves all extremities spontaneously   PSYCH:  reactive affect, euthymic mood        05-06    144  |  108  |  24<H>  ----------------------------<  125<H>  4.1   |  22  |  0.86    eGFR: 74    Ca    9.0      05-06  Mg     2.2     05-06  Phos  2.8     05-06

## 2023-05-06 NOTE — DISCHARGE NOTE PROVIDER - NSDCACTIVITY_GEN_ALL_CORE
Return to Work/School allowed/Bathing allowed/Do not drive or operate machinery/Showering allowed/Do not make important decisions/Stairs allowed/Walking - Indoors allowed/Walking - Outdoors allowed/Follow Instructions Provided by your Surgical Team

## 2023-05-06 NOTE — DISCHARGE NOTE NURSING/CASE MANAGEMENT/SOCIAL WORK - NSDCPEFALRISK_GEN_ALL_CORE
For information on Fall & Injury Prevention, visit: [email or web address] OR  [email or web address] OR  https://www.cdc.gov/steadi/patient.html

## 2023-05-06 NOTE — DISCHARGE NOTE PROVIDER - HOSPITAL COURSE
HPI:  68 yo female, PMH hypothyroid, psychiatric disorders with psychosis and hallucination f/u with psychologist and stable on medication,  sinus surgery, septal perforation, chronic sinusitis, lumbar spine stenosis s/p lumbar laminectomy. Pt. reports that she has been losing her vision due to a  pituitary tumor discovered over a year ago. Pt. presents to [facility] for scheduled Endoscopic Transphenoidal Removal of Pituitary Tumor, Functional endoscopic Sinus Surgery with Image Guidance on 5/4/23. Denies recent fever, chills, chest pain, SOB, changes in bowel/urinary habits or recent exposure to COVID-19 - pt. states she never had COVID infection.   Pt. will have pre-op medical clearance on 4/27. Appointment with Dr. Hernadez, ENT on 5/1.   Brief neurosurgery note stating that they have called Dr. Clark and that she is stable on psych medication (placed in chart) (19 Apr 2023 18:12)    Patient admitted and underwent on 5/4/23 s/p Transphenoidal for pituitary tumor resection with ENT.   She had routine post operative care and did well. No evidence of post op DI or CSF leak. Followed in consultation by endocrinology. Psychiatry consult confirmed her home psych medications and dosing.   PT/OT evaluated her and recommended home PT/OT. She will need both endocrine and psych follow up. On the day of discharge she is medically and neurologically stable for discharge to home with home PT/OT.

## 2023-05-06 NOTE — PROGRESS NOTE ADULT - SUBJECTIVE AND OBJECTIVE BOX
66 yo female, PMH hypothyroid, psychiatric disorders with psychosis and hallucination f/u with psychologist and stable on medication,  sinus surgery, septal perforation, chronic sinusitis, lumbar spine stenosis s/p lumbar laminectomy. Pt. reports that she has been losing her vision due to a  pituitary tumor discovered over a year ago. Pt. presents to [facility] for scheduled Endoscopic Transphenoidal Removal of Pituitary Tumor, Functional endoscopic Sinus Surgery with Image Guidance on 5/4/23. Denies recent fever, chills, chest pain, SOB, changes in bowel/urinary habits or recent exposure to COVID-19 - pt. states she never had COVID infection.   Pt. will have pre-op medical clearance on 4/27. Appointment with Dr. Hernadez, ENT on 5/1.   Brief neurosurgery note stating that they have called Dr. Clark and that she is stable on psych medication (placed in chart) (19 Apr 2023 18:12)    Patient admitted and underwent on 5/4/23 s/p Transphenoidal for pituitary tumor resection with ENT.   She had routine post operative care and did well. No evidence of post op DI or CSF leak. Followed in consultation by endocrinology. Psychiatry consult confirmed her home psych medications and dosing.   PT/OT evaluated her and recommended home PT/OT. She will need both endocrine and psych follow up. On the day of discharge she is medically and neurologically stable for discharge to home with home PT/OT.      Vital Signs Last 24 Hrs  T(C): 36.7 (06 May 2023 12:40), Max: 36.7 (05 May 2023 21:27)  T(F): 98 (06 May 2023 12:40), Max: 98.1 (05 May 2023 21:27)  HR: 96 (06 May 2023 12:40) (86 - 96)  BP: 99/59 (06 May 2023 12:40) (99/59 - 121/84)  BP(mean): --  RR: 18 (06 May 2023 12:40) (18 - 18)  SpO2: 96% (06 May 2023 12:40) (95% - 98%)    Parameters below as of 06 May 2023 12:40  Patient On (Oxygen Delivery Method): room air    A+O x 3, speech clear, visual field cut noted- unchanged, WARREN x 4 with good strnegth/sensation  no evidence of CSF leak or DI    endocrinology following for secondary AI/hypothyroid- has outpaitnet endo to follow up with  stable on home psych meds    < from: [facility] Duplex Lower Ext Vein Scan, Bilat (05.05.23 @ 20:15) >  IMPRESSION:  No evidence of deep venous thrombosis in either lower extremity.    < end of copied text >    < from: MR Head w/wo IV Cont (05.05.23 @ 13:02) >  IMPRESSION:    1. PITUITARY:  Transsphenoidal resection of pituitary tumor. Residual   linear enhancement without mass lesion    2. BRAIN:  Unremarkable MR of the brain.    < end of copied text >    MEDICATIONS  (STANDING):  enoxaparin Injectable 40 milliGRAM(s) SubCutaneous <User Schedule>  hydrocortisone 20 milliGRAM(s) Oral <User Schedule>  hydrocortisone 10 milliGRAM(s) Oral <User Schedule>  levothyroxine 50 MICROGram(s) Oral daily  multivitamin/minerals 1 Tablet(s) Oral daily  polyethylene glycol 3350 17 Gram(s) Oral two times a day  senna 2 Tablet(s) Oral at bedtime  sodium chloride 0.65% Nasal 1 Spray(s) Both Nostrils four times a day  ziprasidone 40 milliGRAM(s) Oral two times a day    MEDICATIONS  (PRN):  acetaminophen     Tablet .. 650 milliGRAM(s) Oral every 6 hours PRN Temp greater or equal to 38C (100.4F), Mild Pain (1 - 3)  OLANZapine 2.5 milliGRAM(s) Oral every 6 hours PRN for agitation  ondansetron Injectable 8 milliGRAM(s) IV Push once PRN Nausea and/or Vomiting

## 2023-05-06 NOTE — DISCHARGE NOTE PROVIDER - CARE PROVIDER_API CALL
Luis Daniel Rust)  Neurosurgery  [address], [address]  [place] [address]  Phone: [phone]  Fax: [phone]  Follow Up Time:     Leonora Hernadez)  Otolaryngology  [address], [address]  [place] [address]  Phone: [phone]  Fax: [phone]  Follow Up Time:    Luis Daniel Rust (MD)  Neurosurgery  [address], [address]  [place] [address]  Phone: [phone]  Fax: [phone]  Follow Up Time:     Leonora Hernadez)  Otolaryngology  [address], [address]  [place] [address]  Phone: [phone]  Fax: [phone]  Follow Up Time:     Hom[place])  Ophthalmology  [address], [address]  [place] [address]  Phone: [phone]  Fax: [phone]  Follow Up Time:

## 2023-05-06 NOTE — DISCHARGE NOTE PROVIDER - NSDCFUADDAPPT_GEN_ALL_CORE_FT
Please follow up with the following physicians within 1-2 weeks:  1- Dr Rust- neurosurgeon  2- Dr Hernadez- ENT  3- Dr Astrid Blanco- endocrinology  4- Dr Jeffries- psychiatry  5- PCP    Please have chemistry checked in 1 week. (sodium levels)     Please avoid nasal trauma, do not blow your nose, use straws or incentive spirometers.  DO NOT HAVE COVID NASAL SWABS PERFORMED AS THIS COULD LEAD TO CSF LEAK. If you notice nasal drainage, post nasal drainage, or metallic/salty taste please notify your neurosurgeon and/or ENT immediately.     People who have adrenal insufficiency  must take hormones to replace the hormones they don't have enough of. But if they get sick, get into an accident, or have surgery, the hormones they normally take are not  enough & have to take extra medicine when they are sick or when they know they are going to have surgery. Double your steroid ( hydrocortisone) dose x 48 hrs If not better in 48 hrs continue double dose & call endocrinologist for further advise. Will need IV stress dose steroids prior to surgery  Please follow up with the following physicians within 1-2 weeks:  1- Dr Rust- neurosurgeon  2- Dr Hernadez- ENT  3- Dr Astrid Blanco- endocrinology  4- Dr Jeffries- psychiatry  5- PCP  6- Dr Land- neuro- ophthalmology    Please have chemistry checked in 1 week. (sodium levels)     Please avoid nasal trauma, do not blow your nose, use straws or incentive spirometers.  DO NOT HAVE COVID NASAL SWABS PERFORMED AS THIS COULD LEAD TO CSF LEAK. If you notice nasal drainage, post nasal drainage, or metallic/salty taste please notify your neurosurgeon and/or ENT immediately.     People who have adrenal insufficiency  must take hormones to replace the hormones they don't have enough of. But if they get sick, get into an accident, or have surgery, the hormones they normally take are not  enough & have to take extra medicine when they are sick or when they know they are going to have surgery. Double your steroid ( hydrocortisone) dose x 48 hrs If not better in 48 hrs continue double dose & call endocrinologist for further advise. Will need IV stress dose steroids prior to surgery

## 2023-05-09 LAB
CULTURE RESULTS: SIGNIFICANT CHANGE UP
SPECIMEN SOURCE: SIGNIFICANT CHANGE UP

## 2023-05-11 LAB — SURGICAL PATHOLOGY STUDY: SIGNIFICANT CHANGE UP

## 2023-05-15 ENCOUNTER — APPOINTMENT (OUTPATIENT)
Dept: OTOLARYNGOLOGY | Facility: CLINIC | Age: 68
End: 2023-05-15
Payer: COMMERCIAL

## 2023-05-15 PROCEDURE — 99024 POSTOP FOLLOW-UP VISIT: CPT

## 2023-05-15 NOTE — PROCEDURE
[FreeTextEntry6] : Anesthetized with topical lidocaine and Afrin.  Forceps used to debride crusting occluding both nasal passages, still with large crust straddling septum however patient did not want further removal.

## 2023-05-15 NOTE — ASSESSMENT
[FreeTextEntry1] : Status post transsphenoidal pituitary resection, with history of pre-existing septal perforation, with massive crusting:\par – Patient is noncompliant with nasal rinses\par – Debrided as much as possible today with significant improvement in breathing\par – She still has a significant crust however she wanted to leave it be at this time\par – She is otherwise healing well and should follow-up in 2 to 3 weeks

## 2023-05-15 NOTE — PHYSICAL EXAM
[Normal] : inferior turbinates and middle turbinates are normal [de-identified] : Massive nasal crusting obstructing the perforation in bilateral nasal passages

## 2023-05-15 NOTE — HISTORY OF PRESENT ILLNESS
[de-identified] : 67 year old female presents for post op visit. \par S/p Endoscopic transnasal transsphenoidal approach to the sella, Removal of tumor, Use of stereotactic guidance on 05/04/23 with Dr. Rust. \par Patient reports yesterday having trouble breathing through mouth yesterday, but improved today. \par To be following up with neuro this week. \par Denies fevers or infections. Denies bleeding.

## 2023-05-17 ENCOUNTER — APPOINTMENT (OUTPATIENT)
Dept: SPINE | Facility: CLINIC | Age: 68
End: 2023-05-17
Payer: COMMERCIAL

## 2023-05-17 VITALS
HEIGHT: 61 IN | HEART RATE: 75 BPM | OXYGEN SATURATION: 98 % | DIASTOLIC BLOOD PRESSURE: 85 MMHG | SYSTOLIC BLOOD PRESSURE: 128 MMHG | BODY MASS INDEX: 23.6 KG/M2 | WEIGHT: 125 LBS

## 2023-05-17 PROCEDURE — 99024 POSTOP FOLLOW-UP VISIT: CPT

## 2023-05-17 NOTE — PHYSICAL EXAM
[General Appearance - Alert] : alert [General Appearance - In No Acute Distress] : in no acute distress [General Appearance - Well Nourished] : well nourished [General Appearance - Well Developed] : well developed [General Appearance - Well-Appearing] : healthy appearing [] : normal voice and communication [Oriented To Time, Place, And Person] : oriented to person, place, and time [Impaired Insight] : insight and judgment were intact [Affect] : the affect was normal [Mood] : the mood was normal [Memory Recent] : recent memory was not impaired [Memory Remote] : remote memory was not impaired [Person] : oriented to person [Place] : oriented to place [Time] : oriented to time [Short Term Intact] : short term memory intact [Remote Intact] : remote memory intact [Span Intact] : the attention span was normal [Concentration Intact] : normal concentrating ability [Fluency] : fluency intact [Comprehension] : comprehension intact [Current Events] : adequate knowledge of current events [Past History] : adequate knowledge of personal past history [Vocabulary] : adequate range of vocabulary [Cranial Nerves Oculomotor (III)] : extraocular motion intact [Cranial Nerves Trigeminal (V)] : facial sensation intact symmetrically [Cranial Nerves Facial (VII)] : face symmetrical [Cranial Nerves Vestibulocochlear (VIII)] : hearing was intact bilaterally [Cranial Nerves Glossopharyngeal (IX)] : tongue and palate midline [Cranial Nerves Accessory (XI - Cranial And Spinal)] : head turning and shoulder shrug symmetric [Cranial Nerves Hypoglossal (XII)] : there was no tongue deviation with protrusion [Motor Tone] : muscle tone was normal in all four extremities [Motor Strength] : muscle strength was normal in all four extremities [No Muscle Atrophy] : normal bulk in all four extremities [Sensation Tactile Decrease] : light touch was intact [Limited Balance] : the patient's balance was impaired [2+] : Triceps left 2+ [1+] : Ankle jerk left 1+ [FreeTextEntry1] : No evidence of CSF leak. [4] : C5 Biceps 4/5 [3] : L4/5 extensor hallucis longus 3/5 [5] : S1 flexor hallucis longus 5/5 [Past-pointing] : there was no past-pointing [Tremor] : no tremor present [FreeTextEntry5] : Can see some shadows with the right eye.  Has difficulty finger counting.  Able to finger Count in the nasal field of the left eye.  Is able to follow a person down the hallway because she is able to see the shadow. [FreeTextEntry8] : Needed some assistance getting out of the chair.  Slightly stiff gait and uses a walker for stability.

## 2023-05-17 NOTE — ASSESSMENT
[FreeTextEntry1] : Activity levels were discussed.  The patient is to avoid bending, lifting and straining.  She is to continue her follow up with Dr. Blanco, Dr. Hernadez. and Dr. Mauricio and is to call Dr. Blanco's office tomorrow regarding her blood work and symptoms.  She is to return to this office in 3 weeks for evaluation and in 3 months with a new MRI  of the pituitary with and without contrast for review by Dr. Rust.

## 2023-05-17 NOTE — HISTORY OF PRESENT ILLNESS
[FreeTextEntry1] : DEV KNIGHT is a 67 year old lady who had a history of a pituitary sellar mass and visual loss.  She had been seen prior to surgery and had significant psychiatric disorders, with hallucinations and psychosis.  She became under the care of Dr. Clark and was started on medication prior to surgery and has no longer had any visual or auditory hallucinations.  She was able to undergo an endoscopic transnasal transsphenoidal removal of a pituitary adenoma on 05/04/2023.\par

## 2023-05-17 NOTE — REASON FOR VISIT
[de-identified] : 1. Endoscopic transnasal transsphenoidal approach to the sella. 2. Removal of tumor.  3. Use of stereotactic guidance. [de-identified] : 05/04/2023 [de-identified] : 13 [de-identified] : 2 [de-identified] : The patient stated that she is doing well after surgery but has not had any improvement in her vision.  She denies any evidence of CSF rhinorrhea.  The patient and her  stated that she has been urinating frequently and she does often feel thirsty.  She had blood work done which was ordered by Dr. Astrid Blanco's office and is going to call the office tomorrow for the results.  Mrs. Nguyen denies any headaches, nausea, or excessive fatigue.  She has an appointment with Dr. Mauricio at the end of May and was seen by Dr. Hernadez two days ago.

## 2023-05-19 ENCOUNTER — APPOINTMENT (OUTPATIENT)
Dept: OTOLARYNGOLOGY | Facility: CLINIC | Age: 68
End: 2023-05-19
Payer: COMMERCIAL

## 2023-05-19 ENCOUNTER — APPOINTMENT (OUTPATIENT)
Dept: OTOLARYNGOLOGY | Facility: CLINIC | Age: 68
End: 2023-05-19

## 2023-05-19 VITALS
BODY MASS INDEX: 23.6 KG/M2 | TEMPERATURE: 98 F | HEIGHT: 61 IN | SYSTOLIC BLOOD PRESSURE: 130 MMHG | WEIGHT: 125 LBS | DIASTOLIC BLOOD PRESSURE: 72 MMHG | HEART RATE: 88 BPM

## 2023-05-19 PROCEDURE — 31237 NSL/SINS NDSC SURG BX POLYPC: CPT | Mod: 50,58

## 2023-05-19 PROCEDURE — 99024 POSTOP FOLLOW-UP VISIT: CPT

## 2023-05-19 NOTE — PROCEDURE
[FreeTextEntry6] : Afrin and lidocaine were sprayed topically in both nasal passages. Rigid scope #1 was used. Right nasal passage with crusting that was debrided with forceps.  Large crust that was straddling the septal perforation was taken down piecemeal until the entire thing was removed.  There was crusting along the sphenoidotomy which was gently debrided and suctioned as wellbilateral maxillary antrostomies were patent and these were suctioned clear of some crusting and mucus as well.  At the conclusion left and right nasal passages were both widely patent.  Patient tolerated the procedure well.

## 2023-05-19 NOTE — PHYSICAL EXAM
[Normal] : inferior turbinates and middle turbinates are normal [de-identified] : Massive nasal crusting obstructing the perforation in bilateral nasal passages

## 2023-05-19 NOTE — ASSESSMENT
Lipid abnormalities are worsening.  Pharmacotherapy as ordered.  Lipids will be reassessed 4 months.   [FreeTextEntry1] : Status post transsphenoidal pituitary resection, with history of pre-existing septal perforation, with massive crusting:\par – Patient is noncompliant with nasal rinses\par – Debrided completely today with significant improvement in her breathing\par – Encouraged her to use a saline rinse at least once a day\par – She is otherwise healing well and should follow-up in 2-3 weeks

## 2023-05-19 NOTE — HISTORY OF PRESENT ILLNESS
[de-identified] : 67 year old female presents for post op visit. \par S/p Endoscopic transnasal transsphenoidal approach to the sella, FESS 05/04/23 with Dr. Rust. \par Patient reports yesterday having trouble breathing through nose. Was partially debrided on Mon but she declined complete debridement. She is back today to finish debriding as she finds the rinse is not able to go in and she cannot breathe through her nose.\par

## 2023-05-24 ENCOUNTER — APPOINTMENT (OUTPATIENT)
Dept: OTOLARYNGOLOGY | Facility: CLINIC | Age: 68
End: 2023-05-24
Payer: COMMERCIAL

## 2023-05-24 VITALS
TEMPERATURE: 97.4 F | OXYGEN SATURATION: 96 % | DIASTOLIC BLOOD PRESSURE: 84 MMHG | HEART RATE: 90 BPM | SYSTOLIC BLOOD PRESSURE: 130 MMHG

## 2023-05-24 PROCEDURE — 31237 NSL/SINS NDSC SURG BX POLYPC: CPT | Mod: 50,58

## 2023-05-24 PROCEDURE — 99024 POSTOP FOLLOW-UP VISIT: CPT

## 2023-05-25 NOTE — HISTORY OF PRESENT ILLNESS
[de-identified] : 67 year old female presents for post op visit. \par S/p Endoscopic transnasal transsphenoidal approach to the sella, FESS 05/04/23 with Dr. Rust. \par Patient reports yesterday having trouble breathing through nose. Was mostly debrided on 5/19. She is back today to redebride as she finds the rinse is not able to go in and she cannot breathe through her nose.\par

## 2023-05-25 NOTE — PHYSICAL EXAM
[Normal] : inferior turbinates and middle turbinates are normal [de-identified] : Massive nasal crusting obstructing the perforation in bilateral nasal passages

## 2023-05-25 NOTE — ASSESSMENT
[FreeTextEntry1] : Status post transsphenoidal pituitary resection, with history of pre-existing septal perforation, with massive crusting:\par – Patient is noncompliant with nasal rinses\par – Debrided completely today with significant improvement in her breathing\par – Encouraged her to use a saline rinse at least once a day\par – She is otherwise healing well and should follow-up in 2-3 weeks

## 2023-05-31 ENCOUNTER — APPOINTMENT (OUTPATIENT)
Dept: OPHTHALMOLOGY | Facility: CLINIC | Age: 68
End: 2023-05-31

## 2023-06-02 ENCOUNTER — APPOINTMENT (OUTPATIENT)
Dept: OTOLARYNGOLOGY | Facility: CLINIC | Age: 68
End: 2023-06-02
Payer: COMMERCIAL

## 2023-06-02 VITALS
SYSTOLIC BLOOD PRESSURE: 115 MMHG | DIASTOLIC BLOOD PRESSURE: 76 MMHG | HEIGHT: 61 IN | WEIGHT: 135 LBS | TEMPERATURE: 97.7 F | HEART RATE: 104 BPM | BODY MASS INDEX: 25.49 KG/M2

## 2023-06-02 PROCEDURE — 99024 POSTOP FOLLOW-UP VISIT: CPT

## 2023-06-02 PROCEDURE — 31237 NSL/SINS NDSC SURG BX POLYPC: CPT | Mod: 50,58

## 2023-06-02 NOTE — PHYSICAL EXAM
[Normal] : inferior turbinates and middle turbinates are normal [de-identified] : Massive nasal crusting obstructing the perforation in bilateral nasal passages

## 2023-06-02 NOTE — ASSESSMENT
[FreeTextEntry1] : Status post transsphenoidal pituitary resection, with history of pre-existing septal perforation, with massive crusting:\par – Patient is noncompliant with nasal rinses\par – Debrided completely today with significant improvement in her breathing\par – Encouraged her to use a saline rinse at least once a day\par – She is otherwise healing well and should follow-up in 1 week

## 2023-06-02 NOTE — PROCEDURE
[FreeTextEntry6] : Afrin and lidocaine were sprayed topically in both nasal passages. Rigid scope #1 was used. Right nasal passage with crusting that was debrided with forceps.  Large crust that was straddling the septal perforation was taken down piecemeal until the entire thing was removed. There was crusting along the sphenoidotomy which was debrided and suctioned as well bilateral maxillary antrostomies were patent and these were suctioned clear of some crusting and mucus as well. The sphenoid is nearly completely healed and the maxillaries are patent but with significant mucosal edema. At the conclusion left and right nasal passages were both widely patent.  Patient tolerated the procedure well.

## 2023-06-02 NOTE — HISTORY OF PRESENT ILLNESS
[de-identified] : 67 year old female presents for post op visit. \par S/p Endoscopic transnasal transsphenoidal approach to the sella, FESS 05/04/23 with Dr. Rust. \par Patient reports started to get crusting in the nose again. She did the rinse a couple times. \par

## 2023-06-03 LAB
CULTURE RESULTS: SIGNIFICANT CHANGE UP
SPECIMEN SOURCE: SIGNIFICANT CHANGE UP

## 2023-06-07 ENCOUNTER — NON-APPOINTMENT (OUTPATIENT)
Age: 68
End: 2023-06-07

## 2023-06-07 ENCOUNTER — APPOINTMENT (OUTPATIENT)
Dept: OPHTHALMOLOGY | Facility: CLINIC | Age: 68
End: 2023-06-07
Payer: COMMERCIAL

## 2023-06-07 PROCEDURE — 92012 INTRM OPH EXAM EST PATIENT: CPT

## 2023-06-09 ENCOUNTER — APPOINTMENT (OUTPATIENT)
Dept: OTOLARYNGOLOGY | Facility: CLINIC | Age: 68
End: 2023-06-09
Payer: COMMERCIAL

## 2023-06-09 VITALS
SYSTOLIC BLOOD PRESSURE: 136 MMHG | TEMPERATURE: 98.1 F | HEART RATE: 105 BPM | WEIGHT: 135 LBS | BODY MASS INDEX: 25.49 KG/M2 | DIASTOLIC BLOOD PRESSURE: 82 MMHG | HEIGHT: 61 IN

## 2023-06-09 PROCEDURE — 31237 NSL/SINS NDSC SURG BX POLYPC: CPT | Mod: 50,58

## 2023-06-09 PROCEDURE — 99024 POSTOP FOLLOW-UP VISIT: CPT

## 2023-06-09 NOTE — PHYSICAL EXAM
[Normal] : inferior turbinates and middle turbinates are normal [de-identified] : Massive nasal crusting obstructing the perforation in bilateral nasal passages

## 2023-06-09 NOTE — HISTORY OF PRESENT ILLNESS
[de-identified] : 67 year old female presents for post op visit. \par S/p Endoscopic transnasal transsphenoidal approach to the sella, FESS 05/04/23 with Dr. Rust. \par Patient reports started to get crusting in the nose again. She did the rinse a couple times. \par

## 2023-06-09 NOTE — PROCEDURE
[FreeTextEntry6] : Afrin and lidocaine were sprayed topically in both nasal passages. Rigid scope #9 was used. Right nasal passage with crusting that was debrided with forceps.  Large crust that was straddling the septal perforation was taken down piecemeal until the entire thing was removed. There was crusting along the sphenoidotomy which was debrided and suctioned as well bilateral maxillary antrostomies were patent and these were suctioned clear of some crusting and mucus as well. The sphenoid is nearly completely healed and the maxillaries are patent but with significant mucosal edema. At the conclusion left and right nasal passages were both widely patent.  Patient tolerated the procedure well.

## 2023-06-09 NOTE — ASSESSMENT
[FreeTextEntry1] : Status post transsphenoidal pituitary resection, with history of pre-existing septal perforation, with massive crusting:\par – Patient is noncompliant with nasal rinses\par – Debrided completely today with significant improvement in her breathing\par – Encouraged her to use a saline rinse at least once a day\par - will treat with 1 week of augmentin with the significant mucopurulence noted\par – She is otherwise healing well and should follow-up in 1 week

## 2023-06-12 ENCOUNTER — NON-APPOINTMENT (OUTPATIENT)
Age: 68
End: 2023-06-12

## 2023-06-14 ENCOUNTER — APPOINTMENT (OUTPATIENT)
Dept: SPINE | Facility: CLINIC | Age: 68
End: 2023-06-14
Payer: COMMERCIAL

## 2023-06-14 VITALS
SYSTOLIC BLOOD PRESSURE: 126 MMHG | WEIGHT: 135 LBS | BODY MASS INDEX: 25.49 KG/M2 | HEART RATE: 96 BPM | HEIGHT: 61 IN | OXYGEN SATURATION: 98 % | DIASTOLIC BLOOD PRESSURE: 82 MMHG

## 2023-06-14 DIAGNOSIS — M25.512 PAIN IN LEFT SHOULDER: ICD-10-CM

## 2023-06-14 PROCEDURE — 99024 POSTOP FOLLOW-UP VISIT: CPT

## 2023-06-15 LAB — EAR NOSE AND THROAT CULTURE: ABNORMAL

## 2023-06-16 ENCOUNTER — APPOINTMENT (OUTPATIENT)
Dept: OTOLARYNGOLOGY | Facility: CLINIC | Age: 68
End: 2023-06-16
Payer: COMMERCIAL

## 2023-06-16 PROCEDURE — 31237 NSL/SINS NDSC SURG BX POLYPC: CPT | Mod: 50,58

## 2023-06-16 PROCEDURE — 99024 POSTOP FOLLOW-UP VISIT: CPT

## 2023-06-16 NOTE — PROCEDURE
[FreeTextEntry6] : Afrin and lidocaine were sprayed topically in both nasal passages. Rigid scope #9 was used. Right nasal passage with crusting that was debrided with forceps.  Large crust that was straddling the septal perforation was taken down piecemeal until the entire thing was removed. There was crusting along the sphenoidotomy which was debrided and suctioned as well bilateral maxillary antrostomies were patent and these were suctioned clear of some crusting and mucus as well. The sphenoid is nearly completely healed and the maxillaries are patent but with significant mucosal edema on left, better on right. At the conclusion left and right nasal passages were both widely patent.  Patient tolerated the procedure well.

## 2023-06-16 NOTE — PHYSICAL EXAM
[Normal] : inferior turbinates and middle turbinates are normal [de-identified] : Massive nasal crusting obstructing the perforation in bilateral nasal passages

## 2023-06-16 NOTE — HISTORY OF PRESENT ILLNESS
[de-identified] : 67 year old female presents for post op visit. \par S/p Endoscopic transnasal transsphenoidal approach to the sella, FESS 05/04/23 with Dr. Rust. \par Patient reports started to get crusting in the nose again. She did the rinse a couple times. She was given another course of antibiotics to clean up some purulence noted.\par

## 2023-06-23 ENCOUNTER — APPOINTMENT (OUTPATIENT)
Dept: OTOLARYNGOLOGY | Facility: CLINIC | Age: 68
End: 2023-06-23
Payer: COMMERCIAL

## 2023-06-23 VITALS
BODY MASS INDEX: 25.49 KG/M2 | DIASTOLIC BLOOD PRESSURE: 79 MMHG | WEIGHT: 135 LBS | SYSTOLIC BLOOD PRESSURE: 133 MMHG | HEIGHT: 61 IN | TEMPERATURE: 97.6 F | HEART RATE: 106 BPM

## 2023-06-23 PROCEDURE — 99024 POSTOP FOLLOW-UP VISIT: CPT

## 2023-06-23 PROCEDURE — 31237 NSL/SINS NDSC SURG BX POLYPC: CPT | Mod: 50,58

## 2023-06-23 NOTE — HISTORY OF PRESENT ILLNESS
[de-identified] : 67 year old female presents for post op visit. \par S/p Endoscopic transnasal transsphenoidal approach to the sella, FESS 05/04/23 with Dr. Rust.  She has been having extensive crusting postoperatively, and is noncompliant with nasal rinses or saline.  She has been debrided weekly, and notes that this past week has been much better with less crusting.  She is able to breathe better through her nose.  She notes her vision is still blurry but she feels it is starting to come back.\par \par

## 2023-06-23 NOTE — PROCEDURE
[FreeTextEntry6] : Afrin and lidocaine were sprayed topically in both nasal passages. Rigid scope #9 was used. Right nasal passage with crusting that was debrided with forceps. Crust along the perforation was debrided with suction. There was crusting along the sphenoidotomy which was debrided and suctioned as well bilateral maxillary antrostomies were patent and these were suctioned clear of some crusting and mucus as well. The sphenoid is nearly completely healed and the maxillaries are patent but with significant mucosal edema on left, better on right. At the conclusion left and right nasal passages were both widely patent.  Patient tolerated the procedure well.

## 2023-06-23 NOTE — ASSESSMENT
[FreeTextEntry1] : Status post transsphenoidal pituitary resection, with history of pre-existing septal perforation, with massive crusting:\par – Patient is noncompliant with nasal rinses\par – Debrided completely today with significant improvement in her breathing\par – Encouraged her to use a saline rinse at least once a day\par – She is otherwise healing well and should follow-up in 2 weeks

## 2023-06-23 NOTE — PHYSICAL EXAM
[Normal] : inferior turbinates and middle turbinates are normal [de-identified] : nasal crusting and mucus

## 2023-07-07 ENCOUNTER — APPOINTMENT (OUTPATIENT)
Dept: OTOLARYNGOLOGY | Facility: CLINIC | Age: 68
End: 2023-07-07
Payer: COMMERCIAL

## 2023-07-07 VITALS
DIASTOLIC BLOOD PRESSURE: 75 MMHG | BODY MASS INDEX: 25.49 KG/M2 | HEIGHT: 61 IN | HEART RATE: 105 BPM | TEMPERATURE: 97.6 F | WEIGHT: 135 LBS | SYSTOLIC BLOOD PRESSURE: 121 MMHG

## 2023-07-07 DIAGNOSIS — J01.90 ACUTE SINUSITIS, UNSPECIFIED: ICD-10-CM

## 2023-07-07 PROCEDURE — 31237 NSL/SINS NDSC SURG BX POLYPC: CPT | Mod: 50

## 2023-07-07 PROCEDURE — 99024 POSTOP FOLLOW-UP VISIT: CPT

## 2023-07-07 NOTE — PROCEDURE
[FreeTextEntry6] : Afrin and lidocaine were sprayed topically in both nasal passages. Rigid scope #9 was used. Right nasal passage with significant crusting that was debrided with forceps. Crust along the perforation was debrided with suction. There was crusting along the sphenoidotomy which was debrided and suctioned as well bilateral maxillary antrostomies were patent and these were suctioned clear of some crusting and mucus as well. The sphenoid is nearly completely healed and the maxillaries are patent but with significant mucosal edema on left, better on right. At the conclusion left and right nasal passages were both widely patent.  Patient tolerated the procedure well.

## 2023-07-07 NOTE — PHYSICAL EXAM
[Normal] : inferior turbinates and middle turbinates are normal [de-identified] : nasal crusting and mucus

## 2023-07-07 NOTE — ASSESSMENT
[FreeTextEntry1] : Status post transsphenoidal pituitary resection, with history of pre-existing septal perforation, with massive crusting:\par – Patient is noncompliant with nasal rinses and significant crusting today\par – Debrided completely today with significant improvement in her breathing\par – Encouraged her to use a saline rinse at least once a day\par – She is otherwise healing well and should follow-up in 1 week

## 2023-07-07 NOTE — HISTORY OF PRESENT ILLNESS
[de-identified] : 67 year old female presents for post op visit. \par S/p Endoscopic transnasal transsphenoidal approach to the sella, FESS 05/04/23 with Dr. Rust.  She has been having extensive crusting postoperatively, and is noncompliant with nasal rinses or saline.  She has been debrided weekly, and notes that this past week has been much better with less crusting.  She is able to breathe better through her nose.  She notes her vision is still blurry but she feels it is starting to come back.\par \par

## 2023-07-13 ENCOUNTER — NON-APPOINTMENT (OUTPATIENT)
Age: 68
End: 2023-07-13

## 2023-07-13 RX ORDER — VIBEGRON 75 MG/1
TABLET, FILM COATED ORAL
Refills: 0 | Status: ACTIVE | COMMUNITY

## 2023-07-13 RX ORDER — BUDESONIDE 0.5 MG/2ML
0.5 INHALANT ORAL
Qty: 1 | Refills: 2 | Status: DISCONTINUED | COMMUNITY
Start: 2023-03-27 | End: 2023-07-13

## 2023-07-13 RX ORDER — AMOXICILLIN AND CLAVULANATE POTASSIUM 500; 125 MG/1; MG/1
500-125 TABLET, FILM COATED ORAL
Qty: 14 | Refills: 0 | Status: DISCONTINUED | COMMUNITY
Start: 2023-06-09 | End: 2023-07-13

## 2023-07-13 RX ORDER — AMOXICILLIN AND CLAVULANATE POTASSIUM 875; 125 MG/1; MG/1
875-125 TABLET, COATED ORAL TWICE DAILY
Qty: 14 | Refills: 0 | Status: DISCONTINUED | COMMUNITY
Start: 2023-05-24 | End: 2023-07-13

## 2023-07-19 ENCOUNTER — APPOINTMENT (OUTPATIENT)
Dept: OTOLARYNGOLOGY | Facility: CLINIC | Age: 68
End: 2023-07-19
Payer: COMMERCIAL

## 2023-07-19 VITALS
WEIGHT: 135 LBS | SYSTOLIC BLOOD PRESSURE: 124 MMHG | HEIGHT: 61 IN | TEMPERATURE: 97.6 F | HEART RATE: 111 BPM | DIASTOLIC BLOOD PRESSURE: 75 MMHG | BODY MASS INDEX: 25.49 KG/M2

## 2023-07-19 PROCEDURE — 31237 NSL/SINS NDSC SURG BX POLYPC: CPT | Mod: 50,58

## 2023-07-19 PROCEDURE — 99024 POSTOP FOLLOW-UP VISIT: CPT

## 2023-07-19 NOTE — PROCEDURE
[FreeTextEntry6] : Afrin and lidocaine were sprayed topically in both nasal passages. Rigid scope #9 was used. Right nasal passage with significant crusting that was debrided with forceps. Crust along the perforation was debrided with suction. There was crusting along the sphenoidotomy which was debrided and suctioned as well bilateral maxillary antrostomies were patent and these were suctioned clear of some crusting and mucus as well. The sphenoid is completely healed and the maxillaries are patent with resolved edema. Ethmoids with edematous mucosa still. At the conclusion left and right nasal passages were both widely patent.  Patient tolerated the procedure well.

## 2023-07-19 NOTE — ASSESSMENT
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 Looking into stem cell therapy \par – Patient is noncompliant with nasal rinses has crusting today\par – Debrided completely today with significant improvement in her breathing\par – Encouraged her to use a saline rinse at least once a day\par – She is otherwise well healed and should follow-up in 2 weeks

## 2023-07-19 NOTE — HISTORY OF PRESENT ILLNESS
[de-identified] : 67 year old female presents for post op visit. \par S/p Endoscopic transnasal transsphenoidal approach to the sella, FESS 05/04/23 with Dr. Rust.  She has been having extensive crusting postoperatively, and is noncompliant with nasal rinses or saline.  She has been debrided weekly, and notes that she is breathing better through her nose. She cant see out of her right eye and vision is blurry in the left eye. She is looking into stem cell therapy. \par \par

## 2023-07-19 NOTE — PHYSICAL EXAM
[Normal] : inferior turbinates and middle turbinates are normal [de-identified] : nasal crusting and mucus

## 2023-08-02 ENCOUNTER — APPOINTMENT (OUTPATIENT)
Dept: OTOLARYNGOLOGY | Facility: CLINIC | Age: 68
End: 2023-08-02
Payer: COMMERCIAL

## 2023-08-02 VITALS
SYSTOLIC BLOOD PRESSURE: 129 MMHG | TEMPERATURE: 97.7 F | DIASTOLIC BLOOD PRESSURE: 81 MMHG | HEART RATE: 96 BPM | BODY MASS INDEX: 25.49 KG/M2 | HEIGHT: 61 IN | WEIGHT: 135 LBS

## 2023-08-02 PROCEDURE — 31237 NSL/SINS NDSC SURG BX POLYPC: CPT | Mod: 50,58

## 2023-08-02 PROCEDURE — 99024 POSTOP FOLLOW-UP VISIT: CPT

## 2023-08-02 NOTE — HISTORY OF PRESENT ILLNESS
[de-identified] : 67 year old female presents for post op visit.  S/p Endoscopic transnasal transsphenoidal approach to the sella, FESS 05/04/23 with Dr. Rust.  She has been having extensive crusting postoperatively, and is noncompliant with nasal rinses or saline.  She has been debrided weekly, and notes that she is breathing better through her nose. She cant see out of her right eye and vision is blurry in the left eye. She is looking into stem cell therapy. Complains of popping and cracking in her ears.

## 2023-08-02 NOTE — PHYSICAL EXAM
[Normal] : inferior turbinates and middle turbinates are normal [de-identified] : nasal crusting and mucus

## 2023-08-02 NOTE — END OF VISIT
[FreeTextEntry3] : I personally saw and examined Ms. DEV KNIGHT in detail this visit today. I personally reviewed the HPI, PMH, FH, SH, ROS and medications/allergies. I have spoken to JUANCHO Maria regarding the history and have personally determined the assessment and plan of care, and documented this myself. I was present and participated in all key portions of the encounter and all procedures noted above. I have made changes in the body of the note where appropriate.  Attesting Faculty: See Attending Signature Below

## 2023-08-02 NOTE — ASSESSMENT
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 Looking into stem cell therapy. Complains of popping & crackling in her ears and PND.  - Patient is noncompliant with nasal rinses and has crusting today - Debrided completely today with significant improvement in her breathing - Encouraged her to use a saline rinse at least once a day - Advised to start taking antihistamine for PND, once a day. She doesnt want to use nasal sprays  - She is otherwise well healed and should follow-up in a month

## 2023-08-16 ENCOUNTER — APPOINTMENT (OUTPATIENT)
Dept: SPINE | Facility: CLINIC | Age: 68
End: 2023-08-16

## 2023-08-18 ENCOUNTER — APPOINTMENT (OUTPATIENT)
Dept: MRI IMAGING | Facility: IMAGING CENTER | Age: 68
End: 2023-08-18
Payer: COMMERCIAL

## 2023-08-18 ENCOUNTER — OUTPATIENT (OUTPATIENT)
Dept: OUTPATIENT SERVICES | Facility: HOSPITAL | Age: 68
LOS: 1 days | End: 2023-08-18
Payer: COMMERCIAL

## 2023-08-18 DIAGNOSIS — Z98.891 HISTORY OF UTERINE SCAR FROM PREVIOUS SURGERY: Chronic | ICD-10-CM

## 2023-08-18 DIAGNOSIS — Z98.890 OTHER SPECIFIED POSTPROCEDURAL STATES: Chronic | ICD-10-CM

## 2023-08-18 DIAGNOSIS — D35.2 BENIGN NEOPLASM OF PITUITARY GLAND: ICD-10-CM

## 2023-08-18 DIAGNOSIS — Z00.8 ENCOUNTER FOR OTHER GENERAL EXAMINATION: ICD-10-CM

## 2023-08-18 PROCEDURE — A9585: CPT

## 2023-08-18 PROCEDURE — 70553 MRI BRAIN STEM W/O & W/DYE: CPT

## 2023-08-18 PROCEDURE — 70553 MRI BRAIN STEM W/O & W/DYE: CPT | Mod: 26

## 2023-09-06 ENCOUNTER — APPOINTMENT (OUTPATIENT)
Dept: SPINE | Facility: CLINIC | Age: 68
End: 2023-09-06
Payer: COMMERCIAL

## 2023-09-06 VITALS
HEIGHT: 61 IN | OXYGEN SATURATION: 98 % | SYSTOLIC BLOOD PRESSURE: 152 MMHG | HEART RATE: 103 BPM | BODY MASS INDEX: 27.38 KG/M2 | WEIGHT: 145 LBS | DIASTOLIC BLOOD PRESSURE: 95 MMHG

## 2023-09-06 DIAGNOSIS — F60.9 PERSONALITY DISORDER, UNSPECIFIED: ICD-10-CM

## 2023-09-06 PROCEDURE — 99213 OFFICE O/P EST LOW 20 MIN: CPT

## 2023-09-08 ENCOUNTER — APPOINTMENT (OUTPATIENT)
Dept: OPHTHALMOLOGY | Facility: CLINIC | Age: 68
End: 2023-09-08
Payer: COMMERCIAL

## 2023-09-08 ENCOUNTER — NON-APPOINTMENT (OUTPATIENT)
Age: 68
End: 2023-09-08

## 2023-09-08 PROCEDURE — 92012 INTRM OPH EXAM EST PATIENT: CPT

## 2023-09-08 PROCEDURE — 92133 CPTRZD OPH DX IMG PST SGM ON: CPT

## 2023-09-13 ENCOUNTER — APPOINTMENT (OUTPATIENT)
Dept: OTOLARYNGOLOGY | Facility: CLINIC | Age: 68
End: 2023-09-13
Payer: COMMERCIAL

## 2023-09-13 VITALS
HEIGHT: 61 IN | BODY MASS INDEX: 27.38 KG/M2 | DIASTOLIC BLOOD PRESSURE: 83 MMHG | SYSTOLIC BLOOD PRESSURE: 137 MMHG | HEART RATE: 105 BPM | WEIGHT: 145 LBS | TEMPERATURE: 97.7 F

## 2023-09-13 PROCEDURE — 99213 OFFICE O/P EST LOW 20 MIN: CPT | Mod: 25

## 2023-09-13 PROCEDURE — 31237 NSL/SINS NDSC SURG BX POLYPC: CPT | Mod: 50

## 2023-10-03 ENCOUNTER — APPOINTMENT (OUTPATIENT)
Dept: ORTHOPEDIC SURGERY | Facility: CLINIC | Age: 68
End: 2023-10-03

## 2023-10-05 ENCOUNTER — APPOINTMENT (OUTPATIENT)
Dept: MRI IMAGING | Facility: HOSPITAL | Age: 68
End: 2023-10-05

## 2023-10-11 NOTE — DISCHARGE NOTE ADULT - NS DC ANGIO PCI YN
Patient rescheduled from 10/13/23 with Graeme Flannery MD to 1/4/23 with Graeme Flannery MD. Patient insurance will not cover. Notified GI lab  via telephone call/message regarding reschedule. Patient emailed new instructions to verified address and instructions resent thru [facility]  and has prep medications.  
no

## 2023-10-12 ENCOUNTER — APPOINTMENT (OUTPATIENT)
Dept: ORTHOPEDIC SURGERY | Facility: CLINIC | Age: 68
End: 2023-10-12
Payer: COMMERCIAL

## 2023-10-12 DIAGNOSIS — M19.012 PRIMARY OSTEOARTHRITIS, LEFT SHOULDER: ICD-10-CM

## 2023-10-12 PROCEDURE — 20610 DRAIN/INJ JOINT/BURSA W/O US: CPT | Mod: LT

## 2023-10-12 PROCEDURE — 99204 OFFICE O/P NEW MOD 45 MIN: CPT | Mod: 25

## 2023-10-12 PROCEDURE — 73030 X-RAY EXAM OF SHOULDER: CPT | Mod: LT

## 2023-10-13 PROBLEM — M19.012 PRIMARY OSTEOARTHRITIS OF LEFT SHOULDER: Status: ACTIVE | Noted: 2023-10-13

## 2023-10-18 ENCOUNTER — APPOINTMENT (OUTPATIENT)
Dept: OTOLARYNGOLOGY | Facility: CLINIC | Age: 68
End: 2023-10-18
Payer: COMMERCIAL

## 2023-10-18 ENCOUNTER — APPOINTMENT (OUTPATIENT)
Dept: MRI IMAGING | Facility: CLINIC | Age: 68
End: 2023-10-18

## 2023-10-18 VITALS
TEMPERATURE: 98 F | SYSTOLIC BLOOD PRESSURE: 129 MMHG | HEART RATE: 90 BPM | BODY MASS INDEX: 27.38 KG/M2 | DIASTOLIC BLOOD PRESSURE: 78 MMHG | WEIGHT: 145 LBS | HEIGHT: 61 IN

## 2023-10-18 PROCEDURE — 31237 NSL/SINS NDSC SURG BX POLYPC: CPT | Mod: 50

## 2023-10-18 PROCEDURE — 99213 OFFICE O/P EST LOW 20 MIN: CPT | Mod: 25

## 2023-11-17 ENCOUNTER — APPOINTMENT (OUTPATIENT)
Dept: OTOLARYNGOLOGY | Facility: CLINIC | Age: 68
End: 2023-11-17
Payer: COMMERCIAL

## 2023-11-17 VITALS — DIASTOLIC BLOOD PRESSURE: 70 MMHG | TEMPERATURE: 96.5 F | SYSTOLIC BLOOD PRESSURE: 114 MMHG | HEART RATE: 85 BPM

## 2023-11-17 DIAGNOSIS — J32.4 CHRONIC PANSINUSITIS: ICD-10-CM

## 2023-11-17 PROCEDURE — 99212 OFFICE O/P EST SF 10 MIN: CPT | Mod: 25

## 2023-11-17 PROCEDURE — 31238 NSL/SINS NDSC SRG NSL HEMRRG: CPT | Mod: 50

## 2023-11-21 ENCOUNTER — NON-APPOINTMENT (OUTPATIENT)
Age: 68
End: 2023-11-21

## 2023-11-21 ENCOUNTER — APPOINTMENT (OUTPATIENT)
Dept: OPHTHALMOLOGY | Facility: CLINIC | Age: 68
End: 2023-11-21
Payer: COMMERCIAL

## 2023-11-21 PROCEDURE — 92012 INTRM OPH EXAM EST PATIENT: CPT

## 2023-12-13 ENCOUNTER — APPOINTMENT (OUTPATIENT)
Dept: OTOLARYNGOLOGY | Facility: CLINIC | Age: 68
End: 2023-12-13

## 2023-12-21 ENCOUNTER — APPOINTMENT (OUTPATIENT)
Dept: OTOLARYNGOLOGY | Facility: CLINIC | Age: 68
End: 2023-12-21
Payer: COMMERCIAL

## 2023-12-21 VITALS
HEART RATE: 84 BPM | DIASTOLIC BLOOD PRESSURE: 83 MMHG | OXYGEN SATURATION: 95 % | BODY MASS INDEX: 27.38 KG/M2 | WEIGHT: 145 LBS | HEIGHT: 61 IN | SYSTOLIC BLOOD PRESSURE: 137 MMHG

## 2023-12-21 DIAGNOSIS — R51.9 HEADACHE, UNSPECIFIED: ICD-10-CM

## 2023-12-21 PROCEDURE — 99212 OFFICE O/P EST SF 10 MIN: CPT | Mod: 25

## 2023-12-21 PROCEDURE — 31237 NSL/SINS NDSC SURG BX POLYPC: CPT | Mod: 50

## 2023-12-21 NOTE — ASSESSMENT
[FreeTextEntry1] :  Ms. KNIGHT 68 year F S/p TSRP and FESS on 5/4/23 with Dr. Rust: Now complains of constant headaches and right sided eye pressure every morning which is relived with Tylenol or Advil.  - Debrided in office today - Advised to try and use nasal saline to prevent nasal crusting  - F/U one month

## 2023-12-21 NOTE — END OF VISIT
[FreeTextEntry3] : I personally saw and examined Ms. DEV KNIGHT  in detail this visit today. I personally reviewed the HPI, PMH, FH, SH, ROS and medications/allergies. I have spoken to JUANCHO Nguyen regarding the history and have personally determined the assessment and plan of care, and documented this myself. I was present and participated in all key portions of the encounter and all procedures noted above. I have made changes in the body of the note where appropriate.  Attesting Faculty: See Attending Signature Below

## 2023-12-21 NOTE — REASON FOR VISIT
[Subsequent Evaluation] : a subsequent evaluation for [FreeTextEntry2] : S/p TSRP and FESS on 5/4/23 with Dr. Rust.

## 2023-12-21 NOTE — PHYSICAL EXAM
[Nasal Endoscopy Performed] : nasal endoscopy was performed, see procedure section for findings [Normal] : inferior turbinates and middle turbinates are normal [de-identified] : extensive nasal crusting

## 2023-12-21 NOTE — HISTORY OF PRESENT ILLNESS
[de-identified] : S/p TSRP and FESS on 5/4/23 with Dr. Rust.   She notes some nasal congestion that has been improving, Pos daily crusting.  Vision is improving slightly.  Sense of smell is slowly improving.  Feels her taste is recovering much faster. She is not using Sinus wash. Complains of right sided eye pressure and headache, usually feels it a night and first thing in the morning. Tylenol or Advil helps with the pain. Wakes up every morning with a bad headache. At first it started off with intermittent headaches. She stays in bed all day. Had her repeat MRI, which came back wnl.

## 2023-12-21 NOTE — PROCEDURE
[FreeTextEntry6] : Nasal Endoscopy: Afrin and lidocaine were sprayed topically in both nasal passages. Rigid scope #17 was used. Right nasal passage with significant crusting that was debrided with forceps. Crust along the perforation was debrided with suction. There was crusting along the sphenoidotomy which was debrided and suctioned as well bilateral maxillary antrostomies were patent and these were suctioned clear of some crusting and mucus as well. The sphenoid is completely healed and the maxillaries are patent with resolved edema. Ethmoids with edematous mucosa still. At the conclusion left and right nasal passages were both widely patent. Patient tolerated the procedure well.

## 2024-02-09 ENCOUNTER — APPOINTMENT (OUTPATIENT)
Dept: OTOLARYNGOLOGY | Facility: CLINIC | Age: 69
End: 2024-02-09

## 2024-03-19 ENCOUNTER — APPOINTMENT (OUTPATIENT)
Dept: OPHTHALMOLOGY | Facility: CLINIC | Age: 69
End: 2024-03-19

## 2024-03-26 ENCOUNTER — NON-APPOINTMENT (OUTPATIENT)
Age: 69
End: 2024-03-26

## 2024-03-26 ENCOUNTER — APPOINTMENT (OUTPATIENT)
Dept: OPHTHALMOLOGY | Facility: CLINIC | Age: 69
End: 2024-03-26
Payer: COMMERCIAL

## 2024-03-26 PROCEDURE — 92012 INTRM OPH EXAM EST PATIENT: CPT

## 2024-03-27 ENCOUNTER — APPOINTMENT (OUTPATIENT)
Dept: OTOLARYNGOLOGY | Facility: CLINIC | Age: 69
End: 2024-03-27
Payer: COMMERCIAL

## 2024-03-27 VITALS
SYSTOLIC BLOOD PRESSURE: 113 MMHG | BODY MASS INDEX: 27.38 KG/M2 | HEART RATE: 101 BPM | HEIGHT: 61 IN | DIASTOLIC BLOOD PRESSURE: 72 MMHG | WEIGHT: 145 LBS

## 2024-03-27 PROCEDURE — 31237 NSL/SINS NDSC SURG BX POLYPC: CPT | Mod: 50

## 2024-03-27 PROCEDURE — 99213 OFFICE O/P EST LOW 20 MIN: CPT | Mod: 25

## 2024-03-27 NOTE — ASSESSMENT
[FreeTextEntry1] :  Ms. KNIGHT 68 year F S/p TSRP and FESS on 5/4/23 with Dr. Rust: She notes headaches improving and vision improving - Debrided in office today - Advised to try and use nasal saline to prevent nasal crusting  - F/U 3 months

## 2024-03-27 NOTE — HISTORY OF PRESENT ILLNESS
[de-identified] : S/p TSRP and FESS on 5/4/23 with Dr. Rust.   She notes some nasal congestion that has been improving, Pos daily crusting.  Vision is improving slightly.  Sense of smell is slowly improving.  Feels her taste is recovering much faster. She is not using Sinus wash. Complains of right sided eye pressure and headache, usually feels it a night and first thing in the morning. Tylenol or Advil helps with the pain. Wakes up every morning with a bad headache. At first it started off with intermittent headaches. She stays in bed all day. Had her repeat MRI, which came back wnl.  [FreeTextEntry1] : Notes her nose feels congested at times. She feels her vision is coming back a little. The left eye she can now see some shapes. She was in the hospital last week bc she collapsed. Notes they did not find a cause and was d/c after a week.

## 2024-03-27 NOTE — PHYSICAL EXAM
[Nasal Endoscopy Performed] : nasal endoscopy was performed, see procedure section for findings [Normal] : inferior turbinates and middle turbinates are normal [de-identified] : extensive nasal crusting

## 2024-03-27 NOTE — PROCEDURE
[FreeTextEntry6] : Nasal Endoscopy: Afrin and lidocaine were sprayed topically in both nasal passages. Rigid scope #17 was used. Right nasal passage with significant crusting that was debrided with forceps. Crust along the perforation was debrided with suction. There was crusting along the sphenoidotomy which was debrided and suctioned as well bilateral maxillary antrostomies were patent and these were suctioned clear of some crusting and mucus as well. The sphenoid is completely healed and the maxillaries are patent with resolved edema. Ethmoids with minimally edematous mucosa still. At the conclusion left and right nasal passages were both widely patent. Patient tolerated the procedure well.

## 2024-05-01 ENCOUNTER — APPOINTMENT (OUTPATIENT)
Dept: OTOLARYNGOLOGY | Facility: CLINIC | Age: 69
End: 2024-05-01
Payer: COMMERCIAL

## 2024-05-01 VITALS
DIASTOLIC BLOOD PRESSURE: 71 MMHG | BODY MASS INDEX: 27.38 KG/M2 | SYSTOLIC BLOOD PRESSURE: 120 MMHG | WEIGHT: 145 LBS | HEIGHT: 61 IN | HEART RATE: 88 BPM

## 2024-05-01 DIAGNOSIS — J34.89 OTHER SPECIFIED DISORDERS OF NOSE AND NASAL SINUSES: ICD-10-CM

## 2024-05-01 DIAGNOSIS — H93.12 TINNITUS, LEFT EAR: ICD-10-CM

## 2024-05-01 DIAGNOSIS — J30.2 OTHER SEASONAL ALLERGIC RHINITIS: ICD-10-CM

## 2024-05-01 DIAGNOSIS — H93.8X1 OTHER SPECIFIED DISORDERS OF RIGHT EAR: ICD-10-CM

## 2024-05-01 PROCEDURE — 31237 NSL/SINS NDSC SURG BX POLYPC: CPT | Mod: 50

## 2024-05-01 PROCEDURE — 92557 COMPREHENSIVE HEARING TEST: CPT

## 2024-05-01 PROCEDURE — 99214 OFFICE O/P EST MOD 30 MIN: CPT | Mod: 25

## 2024-05-01 PROCEDURE — 92567 TYMPANOMETRY: CPT

## 2024-05-01 NOTE — PHYSICAL EXAM
[Nasal Endoscopy Performed] : nasal endoscopy was performed, see procedure section for findings [Normal] : normal appearance, well groomed, well nourished, and in no acute distress [de-identified] : nasal crusting surrounding nasal septal  perforation

## 2024-05-01 NOTE — PROCEDURE
[FreeTextEntry6] :  Afrin and lidocaine were sprayed topically in both nasal passages. Rigid scope #17 was used. Right nasal passage with significant crusting that was debrided with forceps. Crust along the perforation was debrided with suction. There was crusting along the sphenoidotomy which was debrided and suctioned as well bilateral maxillary antrostomies were patent and these were suctioned clear of some crusting and mucus as well. The sphenoid is completely healed and the maxillaries are patent with resolved edema. Ethmoids with minimally edematous mucosa still. At the conclusion left and right nasal passages were both widely patent. Patient tolerated the procedure well.

## 2024-05-01 NOTE — REVIEW OF SYSTEMS
[Seasonal Allergies] : seasonal allergies [Ear Noises] : ear noises [Nasal Congestion] : nasal congestion [As Noted in HPI] : as noted in HPI [Eye Pain] : eye pain

## 2024-05-01 NOTE — HISTORY OF PRESENT ILLNESS
[de-identified] : S/p TSRP and FESS on 5/4/23 with Dr. Rust.   She returns with some continued headaches and eye pain behind the right eye.  She has ringing in the left ear and popping in the right ear that comes and goes. Discomfort in the right ear on occasion as well  She has nasal dryness and reports seasonal allergies although is not using any nasal sprays or taking allergy meds,  She does report recent right eye pain and the other day actually saw some light Takes sudafed for flare ups that are severe

## 2024-05-01 NOTE — ASSESSMENT
[FreeTextEntry1] : s/p FESS and TSRP with Dr Rust 5/4/2023 - debrided of moderate nasal crusting today around the septal perforation; otherwise looks good - right eye pain: continue to follow up with ophthalmologist  ringing in the left ear/popping in the right ear - audiology evaluation today with mild to mod SNHL - she has routine brain MRI to follow her pituitary adenoma - suspect allergies/ETD contributing to hear popping  Reported seasonal allergies  - recommended starting antihistamine (Rx Allegra); can continue Sudafed PRN for allergy flare ups   - follow up 1 month to recheck Consent: The patient's consent was obtained including but not limited to risks of crusting, scabbing, blistering, scarring, darker or lighter pigmentary change, recurrence, incomplete removal and infection. Duration Of Freeze Thaw-Cycle (Seconds): 1 Detail Level: Detailed Post-Care Instructions: I reviewed with the patient in detail post-care instructions. Patient is to wear sunprotection, and avoid picking at any of the treated lesions. Pt may apply Vaseline to crusted or scabbing areas. Render Post-Care Instructions In Note?: yes

## 2024-05-01 NOTE — END OF VISIT
[FreeTextEntry3] : I personally saw and examined Ms. DEV KNIGHT in detail this visit today. I personally reviewed the HPI, PMH, FH, SH, ROS and medications/allergies. I have spoken to JUANCHO Sheridan ([place]) regarding the history and have personally determined the assessment and plan of care, and documented this myself. I was present and participated in all key portions of the encounter and all procedures noted above. I have made changes in the body of the note where appropriate.  Attesting Faculty: See Attending Signature Below

## 2024-05-06 ENCOUNTER — OUTPATIENT (OUTPATIENT)
Dept: OUTPATIENT SERVICES | Facility: HOSPITAL | Age: 69
LOS: 1 days | End: 2024-05-06
Payer: COMMERCIAL

## 2024-05-06 ENCOUNTER — APPOINTMENT (OUTPATIENT)
Dept: MRI IMAGING | Facility: CLINIC | Age: 69
End: 2024-05-06
Payer: COMMERCIAL

## 2024-05-06 DIAGNOSIS — Z98.890 OTHER SPECIFIED POSTPROCEDURAL STATES: Chronic | ICD-10-CM

## 2024-05-06 DIAGNOSIS — Z98.891 HISTORY OF UTERINE SCAR FROM PREVIOUS SURGERY: Chronic | ICD-10-CM

## 2024-05-06 DIAGNOSIS — D35.2 BENIGN NEOPLASM OF PITUITARY GLAND: ICD-10-CM

## 2024-05-06 PROCEDURE — 70553 MRI BRAIN STEM W/O & W/DYE: CPT

## 2024-05-06 PROCEDURE — A9585: CPT

## 2024-05-06 PROCEDURE — 70553 MRI BRAIN STEM W/O & W/DYE: CPT | Mod: 26

## 2024-06-05 ENCOUNTER — APPOINTMENT (OUTPATIENT)
Dept: OTOLARYNGOLOGY | Facility: CLINIC | Age: 69
End: 2024-06-05

## 2024-06-10 NOTE — ED PROVIDER NOTE - DATE/TIME 2
06/10/24 0919   Rounds   Attendance Provider;Nurse    Discharge Plan A Home   Transition of Care Barriers None     Care team at bedside, discussed plan of care with patient / family.  Discharge planning initiated. Patient provided with Case Management contact information and encouraged to call with concerns or questions.  Will continue to follow for duration of stay.    14-Jun-2018 18:08

## 2024-06-12 ENCOUNTER — APPOINTMENT (OUTPATIENT)
Dept: SPINE | Facility: CLINIC | Age: 69
End: 2024-06-12
Payer: COMMERCIAL

## 2024-06-12 DIAGNOSIS — D35.2 BENIGN NEOPLASM OF PITUITARY GLAND: ICD-10-CM

## 2024-06-12 PROCEDURE — 99213 OFFICE O/P EST LOW 20 MIN: CPT

## 2024-06-12 RX ORDER — FEXOFENADINE HYDROCHLORIDE 180 MG/1
180 TABLET ORAL
Qty: 90 | Refills: 3 | Status: DISCONTINUED | COMMUNITY
Start: 2024-05-01 | End: 2024-06-12

## 2024-06-12 RX ORDER — ZIPRASIDONE 80 MG/1
80 CAPSULE ORAL
Refills: 0 | Status: DISCONTINUED | COMMUNITY
End: 2024-06-12

## 2024-06-12 RX ORDER — HYDROCORTISONE 10 MG/1
10 TABLET ORAL TWICE DAILY
Refills: 0 | Status: ACTIVE | COMMUNITY

## 2024-06-12 RX ORDER — MELOXICAM 15 MG/1
15 TABLET ORAL DAILY
Qty: 15 | Refills: 0 | Status: DISCONTINUED | COMMUNITY
Start: 2023-07-13 | End: 2024-06-12

## 2024-06-12 RX ORDER — HYDROCORTISONE 10 MG/1
10 TABLET ORAL
Refills: 0 | Status: DISCONTINUED | COMMUNITY
End: 2024-06-12

## 2024-07-26 ENCOUNTER — APPOINTMENT (OUTPATIENT)
Dept: OTOLARYNGOLOGY | Facility: CLINIC | Age: 69
End: 2024-07-26

## 2024-08-01 NOTE — H&P PST ADULT - BP NONINVASIVE MEAN (MM HG)
PRE-OP DIAGNOSIS:  Fibroid 01-Aug-2024 09:10:27  Wendy Johnson  Fibroids, submucosal 01-Aug-2024 09:10:35  Wendy Johnson  
89

## 2024-09-12 ENCOUNTER — NON-APPOINTMENT (OUTPATIENT)
Age: 69
End: 2024-09-12

## 2024-09-24 ENCOUNTER — APPOINTMENT (OUTPATIENT)
Dept: OPHTHALMOLOGY | Facility: CLINIC | Age: 69
End: 2024-09-24
Payer: COMMERCIAL

## 2024-09-24 ENCOUNTER — NON-APPOINTMENT (OUTPATIENT)
Age: 69
End: 2024-09-24

## 2024-09-24 PROCEDURE — 92012 INTRM OPH EXAM EST PATIENT: CPT

## 2024-10-24 ENCOUNTER — APPOINTMENT (OUTPATIENT)
Dept: OTOLARYNGOLOGY | Facility: CLINIC | Age: 69
End: 2024-10-24
Payer: COMMERCIAL

## 2024-10-24 VITALS — OXYGEN SATURATION: 97 % | HEART RATE: 72 BPM | BODY MASS INDEX: 26.58 KG/M2 | HEIGHT: 63 IN | WEIGHT: 150 LBS

## 2024-10-24 DIAGNOSIS — R49.0 DYSPHONIA: ICD-10-CM

## 2024-10-24 DIAGNOSIS — J38.01 PARALYSIS OF VOCAL CORDS AND LARYNX, UNILATERAL: ICD-10-CM

## 2024-10-24 DIAGNOSIS — H57.11 OCULAR PAIN, RIGHT EYE: ICD-10-CM

## 2024-10-24 PROCEDURE — 31237 NSL/SINS NDSC SURG BX POLYPC: CPT | Mod: 50

## 2024-10-24 PROCEDURE — 99214 OFFICE O/P EST MOD 30 MIN: CPT | Mod: 25

## 2024-10-24 PROCEDURE — 99213 OFFICE O/P EST LOW 20 MIN: CPT | Mod: 25

## 2024-10-31 ENCOUNTER — APPOINTMENT (OUTPATIENT)
Dept: CT IMAGING | Facility: IMAGING CENTER | Age: 69
End: 2024-10-31
Payer: COMMERCIAL

## 2024-10-31 ENCOUNTER — OUTPATIENT (OUTPATIENT)
Dept: OUTPATIENT SERVICES | Facility: HOSPITAL | Age: 69
LOS: 1 days | End: 2024-10-31
Payer: COMMERCIAL

## 2024-10-31 DIAGNOSIS — J38.01 PARALYSIS OF VOCAL CORDS AND LARYNX, UNILATERAL: ICD-10-CM

## 2024-10-31 DIAGNOSIS — Z98.890 OTHER SPECIFIED POSTPROCEDURAL STATES: Chronic | ICD-10-CM

## 2024-10-31 DIAGNOSIS — Z00.8 ENCOUNTER FOR OTHER GENERAL EXAMINATION: ICD-10-CM

## 2024-10-31 DIAGNOSIS — Z98.891 HISTORY OF UTERINE SCAR FROM PREVIOUS SURGERY: Chronic | ICD-10-CM

## 2024-10-31 PROCEDURE — 70491 CT SOFT TISSUE NECK W/DYE: CPT | Mod: 26

## 2024-10-31 PROCEDURE — 70486 CT MAXILLOFACIAL W/O DYE: CPT

## 2024-10-31 PROCEDURE — 70491 CT SOFT TISSUE NECK W/DYE: CPT

## 2024-10-31 PROCEDURE — 70486 CT MAXILLOFACIAL W/O DYE: CPT | Mod: 26

## 2024-11-06 ENCOUNTER — NON-APPOINTMENT (OUTPATIENT)
Age: 69
End: 2024-11-06

## 2024-11-06 ENCOUNTER — APPOINTMENT (OUTPATIENT)
Dept: OPHTHALMOLOGY | Facility: CLINIC | Age: 69
End: 2024-11-06
Payer: COMMERCIAL

## 2024-11-06 PROCEDURE — 92012 INTRM OPH EXAM EST PATIENT: CPT

## 2024-11-14 ENCOUNTER — APPOINTMENT (OUTPATIENT)
Dept: OTOLARYNGOLOGY | Facility: CLINIC | Age: 69
End: 2024-11-14
Payer: COMMERCIAL

## 2024-11-14 VITALS
RESPIRATION RATE: 17 BRPM | WEIGHT: 150 LBS | HEART RATE: 111 BPM | HEIGHT: 64 IN | SYSTOLIC BLOOD PRESSURE: 122 MMHG | BODY MASS INDEX: 25.61 KG/M2 | OXYGEN SATURATION: 98 % | DIASTOLIC BLOOD PRESSURE: 84 MMHG

## 2024-11-14 DIAGNOSIS — J38.01 PARALYSIS OF VOCAL CORDS AND LARYNX, UNILATERAL: ICD-10-CM

## 2024-11-14 DIAGNOSIS — R13.10 DYSPHAGIA, UNSPECIFIED: ICD-10-CM

## 2024-11-14 DIAGNOSIS — R49.0 DYSPHONIA: ICD-10-CM

## 2024-11-14 PROCEDURE — 31579 LARYNGOSCOPY TELESCOPIC: CPT

## 2024-11-14 PROCEDURE — 99213 OFFICE O/P EST LOW 20 MIN: CPT | Mod: 25

## 2024-11-14 PROCEDURE — 99214 OFFICE O/P EST MOD 30 MIN: CPT | Mod: 25

## 2024-11-27 ENCOUNTER — APPOINTMENT (OUTPATIENT)
Dept: RADIOLOGY | Facility: HOSPITAL | Age: 69
End: 2024-11-27
Payer: COMMERCIAL

## 2024-11-27 ENCOUNTER — OUTPATIENT (OUTPATIENT)
Dept: OUTPATIENT SERVICES | Facility: HOSPITAL | Age: 69
LOS: 1 days | End: 2024-11-27

## 2024-11-27 ENCOUNTER — APPOINTMENT (OUTPATIENT)
Dept: SPEECH THERAPY | Facility: HOSPITAL | Age: 69
End: 2024-11-27
Payer: COMMERCIAL

## 2024-11-27 ENCOUNTER — OUTPATIENT (OUTPATIENT)
Dept: OUTPATIENT SERVICES | Facility: HOSPITAL | Age: 69
LOS: 1 days | Discharge: HOME | End: 2024-11-27

## 2024-11-27 DIAGNOSIS — Z98.890 OTHER SPECIFIED POSTPROCEDURAL STATES: Chronic | ICD-10-CM

## 2024-11-27 DIAGNOSIS — Z98.891 HISTORY OF UTERINE SCAR FROM PREVIOUS SURGERY: Chronic | ICD-10-CM

## 2024-11-27 DIAGNOSIS — R13.10 DYSPHAGIA, UNSPECIFIED: ICD-10-CM

## 2024-11-27 PROCEDURE — 74230 X-RAY XM SWLNG FUNCJ C+: CPT | Mod: 26

## 2024-12-03 DIAGNOSIS — R13.12 DYSPHAGIA, OROPHARYNGEAL PHASE: ICD-10-CM

## 2024-12-05 ENCOUNTER — NON-APPOINTMENT (OUTPATIENT)
Age: 69
End: 2024-12-05

## 2024-12-05 ENCOUNTER — APPOINTMENT (OUTPATIENT)
Dept: OTOLARYNGOLOGY | Facility: CLINIC | Age: 69
End: 2024-12-05
Payer: COMMERCIAL

## 2024-12-05 VITALS — WEIGHT: 150 LBS | BODY MASS INDEX: 25.61 KG/M2 | HEIGHT: 64 IN

## 2024-12-05 VITALS — HEART RATE: 88 BPM | SYSTOLIC BLOOD PRESSURE: 150 MMHG | DIASTOLIC BLOOD PRESSURE: 93 MMHG

## 2024-12-05 DIAGNOSIS — R13.10 DYSPHAGIA, UNSPECIFIED: ICD-10-CM

## 2024-12-05 DIAGNOSIS — R49.0 DYSPHONIA: ICD-10-CM

## 2024-12-05 DIAGNOSIS — J38.01 PARALYSIS OF VOCAL CORDS AND LARYNX, UNILATERAL: ICD-10-CM

## 2024-12-05 PROCEDURE — 99214 OFFICE O/P EST MOD 30 MIN: CPT | Mod: 25

## 2024-12-05 PROCEDURE — 31574Z: CUSTOM | Mod: RT

## 2024-12-18 ENCOUNTER — OUTPATIENT (OUTPATIENT)
Dept: OUTPATIENT SERVICES | Facility: HOSPITAL | Age: 69
LOS: 1 days | End: 2024-12-18
Payer: COMMERCIAL

## 2024-12-18 ENCOUNTER — APPOINTMENT (OUTPATIENT)
Dept: MRI IMAGING | Facility: IMAGING CENTER | Age: 69
End: 2024-12-18

## 2024-12-18 DIAGNOSIS — Z00.8 ENCOUNTER FOR OTHER GENERAL EXAMINATION: ICD-10-CM

## 2024-12-18 DIAGNOSIS — Z98.891 HISTORY OF UTERINE SCAR FROM PREVIOUS SURGERY: Chronic | ICD-10-CM

## 2024-12-18 DIAGNOSIS — R13.10 DYSPHAGIA, UNSPECIFIED: ICD-10-CM

## 2024-12-18 DIAGNOSIS — Z98.890 OTHER SPECIFIED POSTPROCEDURAL STATES: Chronic | ICD-10-CM

## 2024-12-18 DIAGNOSIS — J38.01 PARALYSIS OF VOCAL CORDS AND LARYNX, UNILATERAL: ICD-10-CM

## 2024-12-18 DIAGNOSIS — R49.0 DYSPHONIA: ICD-10-CM

## 2024-12-18 PROCEDURE — 70552 MRI BRAIN STEM W/DYE: CPT | Mod: 26

## 2024-12-18 PROCEDURE — 70552 MRI BRAIN STEM W/DYE: CPT

## 2024-12-18 PROCEDURE — A9585: CPT

## 2024-12-26 ENCOUNTER — NON-APPOINTMENT (OUTPATIENT)
Age: 69
End: 2024-12-26

## 2025-01-06 ENCOUNTER — APPOINTMENT (OUTPATIENT)
Dept: OTOLARYNGOLOGY | Facility: CLINIC | Age: 70
End: 2025-01-06
Payer: COMMERCIAL

## 2025-01-06 VITALS — BODY MASS INDEX: 25.61 KG/M2 | HEIGHT: 64 IN | WEIGHT: 150 LBS

## 2025-01-06 VITALS — HEART RATE: 85 BPM | DIASTOLIC BLOOD PRESSURE: 81 MMHG | SYSTOLIC BLOOD PRESSURE: 141 MMHG

## 2025-01-06 DIAGNOSIS — J38.01 PARALYSIS OF VOCAL CORDS AND LARYNX, UNILATERAL: ICD-10-CM

## 2025-01-06 DIAGNOSIS — R49.0 DYSPHONIA: ICD-10-CM

## 2025-01-06 DIAGNOSIS — R13.10 DYSPHAGIA, UNSPECIFIED: ICD-10-CM

## 2025-01-06 PROCEDURE — 31579 LARYNGOSCOPY TELESCOPIC: CPT

## 2025-01-06 PROCEDURE — 99213 OFFICE O/P EST LOW 20 MIN: CPT | Mod: 25

## 2025-01-13 ENCOUNTER — APPOINTMENT (OUTPATIENT)
Dept: SPINE | Facility: CLINIC | Age: 70
End: 2025-01-13

## 2025-01-13 ENCOUNTER — NON-APPOINTMENT (OUTPATIENT)
Age: 70
End: 2025-01-13

## 2025-01-13 VITALS
HEART RATE: 90 BPM | SYSTOLIC BLOOD PRESSURE: 128 MMHG | BODY MASS INDEX: 25.61 KG/M2 | OXYGEN SATURATION: 93 % | HEIGHT: 64 IN | WEIGHT: 150 LBS | DIASTOLIC BLOOD PRESSURE: 72 MMHG

## 2025-01-13 DIAGNOSIS — D32.9 BENIGN NEOPLASM OF MENINGES, UNSPECIFIED: ICD-10-CM

## 2025-01-13 DIAGNOSIS — G93.89 OTHER SPECIFIED DISORDERS OF BRAIN: ICD-10-CM

## 2025-01-13 DIAGNOSIS — D35.2 BENIGN NEOPLASM OF PITUITARY GLAND: ICD-10-CM

## 2025-01-13 PROCEDURE — 99213 OFFICE O/P EST LOW 20 MIN: CPT

## 2025-01-13 RX ORDER — HYDROCORTISONE 10 MG/1
10 TABLET ORAL
Refills: 0 | Status: ACTIVE | COMMUNITY

## 2025-01-13 RX ORDER — LEVOTHYROXINE SODIUM 0.1 MG/1
100 TABLET ORAL
Refills: 0 | Status: ACTIVE | COMMUNITY

## 2025-02-05 ENCOUNTER — APPOINTMENT (OUTPATIENT)
Dept: NUCLEAR MEDICINE | Facility: CLINIC | Age: 70
End: 2025-02-05

## 2025-02-05 ENCOUNTER — RESULT REVIEW (OUTPATIENT)
Age: 70
End: 2025-02-05

## 2025-02-05 PROCEDURE — 78999 UNLISTED MISC PX DX NUC MED: CPT

## 2025-02-05 PROCEDURE — 78814 PET IMAGE W/CT LMTD: CPT

## 2025-02-05 PROCEDURE — A9592: CPT

## 2025-03-06 ENCOUNTER — APPOINTMENT (OUTPATIENT)
Dept: OTOLARYNGOLOGY | Facility: CLINIC | Age: 70
End: 2025-03-06
Payer: COMMERCIAL

## 2025-03-06 VITALS
RESPIRATION RATE: 17 BRPM | OXYGEN SATURATION: 92 % | HEIGHT: 64 IN | DIASTOLIC BLOOD PRESSURE: 82 MMHG | WEIGHT: 150 LBS | HEART RATE: 96 BPM | BODY MASS INDEX: 25.61 KG/M2 | SYSTOLIC BLOOD PRESSURE: 126 MMHG

## 2025-03-06 DIAGNOSIS — R49.0 DYSPHONIA: ICD-10-CM

## 2025-03-06 DIAGNOSIS — J38.01 PARALYSIS OF VOCAL CORDS AND LARYNX, UNILATERAL: ICD-10-CM

## 2025-03-06 PROCEDURE — 99213 OFFICE O/P EST LOW 20 MIN: CPT | Mod: 25

## 2025-03-06 PROCEDURE — 31579 LARYNGOSCOPY TELESCOPIC: CPT

## 2025-03-19 ENCOUNTER — OUTPATIENT (OUTPATIENT)
Dept: OUTPATIENT SERVICES | Facility: HOSPITAL | Age: 70
LOS: 1 days | End: 2025-03-19
Payer: MEDICARE

## 2025-03-19 ENCOUNTER — APPOINTMENT (OUTPATIENT)
Dept: RADIOLOGY | Facility: HOSPITAL | Age: 70
End: 2025-03-19

## 2025-03-19 ENCOUNTER — APPOINTMENT (OUTPATIENT)
Dept: SPEECH THERAPY | Facility: HOSPITAL | Age: 70
End: 2025-03-19

## 2025-03-19 DIAGNOSIS — Z98.890 OTHER SPECIFIED POSTPROCEDURAL STATES: Chronic | ICD-10-CM

## 2025-03-19 DIAGNOSIS — R13.10 DYSPHAGIA, UNSPECIFIED: ICD-10-CM

## 2025-03-19 DIAGNOSIS — Z98.891 HISTORY OF UTERINE SCAR FROM PREVIOUS SURGERY: Chronic | ICD-10-CM

## 2025-03-19 PROCEDURE — 74230 X-RAY XM SWLNG FUNCJ C+: CPT | Mod: 26

## 2025-03-25 ENCOUNTER — NON-APPOINTMENT (OUTPATIENT)
Age: 70
End: 2025-03-25

## 2025-03-25 ENCOUNTER — APPOINTMENT (OUTPATIENT)
Dept: OPHTHALMOLOGY | Facility: CLINIC | Age: 70
End: 2025-03-25
Payer: COMMERCIAL

## 2025-03-25 PROCEDURE — 92012 INTRM OPH EXAM EST PATIENT: CPT

## 2025-03-29 ENCOUNTER — TRANSCRIPTION ENCOUNTER (OUTPATIENT)
Age: 70
End: 2025-03-29

## 2025-05-06 ENCOUNTER — NON-APPOINTMENT (OUTPATIENT)
Age: 70
End: 2025-05-06

## 2025-05-06 ENCOUNTER — APPOINTMENT (OUTPATIENT)
Dept: OTOLARYNGOLOGY | Facility: CLINIC | Age: 70
End: 2025-05-06
Payer: COMMERCIAL

## 2025-05-06 DIAGNOSIS — F60.9 PERSONALITY DISORDER, UNSPECIFIED: ICD-10-CM

## 2025-05-06 DIAGNOSIS — D32.9 BENIGN NEOPLASM OF MENINGES, UNSPECIFIED: ICD-10-CM

## 2025-05-06 DIAGNOSIS — R49.0 DYSPHONIA: ICD-10-CM

## 2025-05-06 DIAGNOSIS — R05.3 CHRONIC COUGH: ICD-10-CM

## 2025-05-06 PROCEDURE — 99213 OFFICE O/P EST LOW 20 MIN: CPT

## 2025-05-14 ENCOUNTER — APPOINTMENT (OUTPATIENT)
Dept: CT IMAGING | Facility: IMAGING CENTER | Age: 70
End: 2025-05-14

## 2025-05-14 ENCOUNTER — OUTPATIENT (OUTPATIENT)
Dept: OUTPATIENT SERVICES | Facility: HOSPITAL | Age: 70
LOS: 1 days | End: 2025-05-14
Payer: COMMERCIAL

## 2025-05-14 DIAGNOSIS — Z98.890 OTHER SPECIFIED POSTPROCEDURAL STATES: Chronic | ICD-10-CM

## 2025-05-14 DIAGNOSIS — Z98.891 HISTORY OF UTERINE SCAR FROM PREVIOUS SURGERY: Chronic | ICD-10-CM

## 2025-05-14 PROCEDURE — 71250 CT THORAX DX C-: CPT

## 2025-05-14 PROCEDURE — 71250 CT THORAX DX C-: CPT | Mod: 26

## 2025-05-20 NOTE — BH CONSULTATION LIAISON ASSESSMENT NOTE - NS_RISKASSESSMENTINTER_PSY_ALL_CORE
You are leaving against medical advice.  You are risking permanent disability, chronic pain, brain damage and death.  Please return at any time if you change your mind regarding our care and recommendations.  Please follow-up closely outpatient with your physician.  Important follow up closely outpatient with [facility] or your primary care physician  Albuterol as directed if needed for coughing or wheezing.  Augmentin as directed until completed.   Low Acute Suicide Risk

## 2025-05-29 ENCOUNTER — NON-APPOINTMENT (OUTPATIENT)
Age: 70
End: 2025-05-29

## 2025-06-11 ENCOUNTER — APPOINTMENT (OUTPATIENT)
Dept: SPINE | Facility: CLINIC | Age: 70
End: 2025-06-11

## 2025-06-19 ENCOUNTER — OUTPATIENT (OUTPATIENT)
Dept: OUTPATIENT SERVICES | Facility: HOSPITAL | Age: 70
LOS: 1 days | End: 2025-06-19
Payer: COMMERCIAL

## 2025-06-19 ENCOUNTER — APPOINTMENT (OUTPATIENT)
Dept: MRI IMAGING | Facility: IMAGING CENTER | Age: 70
End: 2025-06-19

## 2025-06-19 DIAGNOSIS — Z00.8 ENCOUNTER FOR OTHER GENERAL EXAMINATION: ICD-10-CM

## 2025-06-19 DIAGNOSIS — D35.2 BENIGN NEOPLASM OF PITUITARY GLAND: ICD-10-CM

## 2025-06-19 DIAGNOSIS — Z98.891 HISTORY OF UTERINE SCAR FROM PREVIOUS SURGERY: Chronic | ICD-10-CM

## 2025-06-19 DIAGNOSIS — Z98.890 OTHER SPECIFIED POSTPROCEDURAL STATES: Chronic | ICD-10-CM

## 2025-06-19 PROCEDURE — 70553 MRI BRAIN STEM W/O & W/DYE: CPT

## 2025-06-19 PROCEDURE — 70553 MRI BRAIN STEM W/O & W/DYE: CPT | Mod: 26

## 2025-08-20 ENCOUNTER — APPOINTMENT (OUTPATIENT)
Dept: MRI IMAGING | Facility: CLINIC | Age: 70
End: 2025-08-20

## 2025-08-20 ENCOUNTER — OUTPATIENT (OUTPATIENT)
Dept: OUTPATIENT SERVICES | Facility: HOSPITAL | Age: 70
LOS: 1 days | End: 2025-08-20
Payer: COMMERCIAL

## 2025-08-20 DIAGNOSIS — D35.2 BENIGN NEOPLASM OF PITUITARY GLAND: ICD-10-CM

## 2025-08-20 DIAGNOSIS — Z98.890 OTHER SPECIFIED POSTPROCEDURAL STATES: Chronic | ICD-10-CM

## 2025-08-20 DIAGNOSIS — Z98.891 HISTORY OF UTERINE SCAR FROM PREVIOUS SURGERY: Chronic | ICD-10-CM

## 2025-08-20 DIAGNOSIS — D32.9 BENIGN NEOPLASM OF MENINGES, UNSPECIFIED: ICD-10-CM

## 2025-08-20 PROCEDURE — 99156 MOD SED OTH PHYS/QHP 5/>YRS: CPT

## 2025-08-20 PROCEDURE — 70553 MRI BRAIN STEM W/O & W/DYE: CPT

## 2025-08-20 PROCEDURE — 70553 MRI BRAIN STEM W/O & W/DYE: CPT | Mod: 26

## 2025-08-20 PROCEDURE — 99157 MOD SED OTHER PHYS/QHP EA: CPT

## 2025-08-20 PROCEDURE — A9585: CPT

## 2025-09-16 ENCOUNTER — APPOINTMENT (OUTPATIENT)
Dept: OPHTHALMOLOGY | Facility: CLINIC | Age: 70
End: 2025-09-16

## 2025-09-17 ENCOUNTER — APPOINTMENT (OUTPATIENT)
Dept: SPINE | Facility: CLINIC | Age: 70
End: 2025-09-17
Payer: COMMERCIAL

## 2025-09-17 DIAGNOSIS — D32.9 BENIGN NEOPLASM OF MENINGES, UNSPECIFIED: ICD-10-CM

## 2025-09-17 PROCEDURE — 99212 OFFICE O/P EST SF 10 MIN: CPT | Mod: 3W

## (undated) DEVICE — WARMING BLANKET LOWER ADULT

## (undated) DEVICE — CLEANING SHEATH ENDO-SCRUB FOR STORZ 7230CA ARTHROSCOPE 4MM 70 DEGREE

## (undated) DEVICE — DRAPE CAMERA VIDEO 7"X96"

## (undated) DEVICE — FOLEY TRAY 16FR LF URINE METER SURESTEP

## (undated) DEVICE — PREP BETADINE SPONGE STICKS

## (undated) DEVICE — SOL IRR POUR NS 0.9% 500ML

## (undated) DEVICE — MIDAS REX MR8 CLEARVIEW TN MATCH HEAD 3MM X 13CM DIAMOND

## (undated) DEVICE — DRSG SPLINT INTRA NASAL .25MM STANDARD THIN

## (undated) DEVICE — GLV 6.5 PROTEXIS (WHITE)

## (undated) DEVICE — DRSG MEROCEL STANDARD NO STRING 8CM X 2CM

## (undated) DEVICE — DRAPE MAYO STAND 30"

## (undated) DEVICE — PREP CHLORAPREP HI-LITE ORANGE 26ML

## (undated) DEVICE — GLV 8 PROTEXIS (WHITE)

## (undated) DEVICE — POSITIONER FOAM EGG CRATE ULNAR 2PCS (PINK)

## (undated) DEVICE — DRSG STERISTRIPS 0.5 X 4"

## (undated) DEVICE — DRAPE 1/2 SHEET 40X57"

## (undated) DEVICE — CLEANING SHEATH ENDO-SCRUB FOR STORZ 7210AA TELESCOPE 4MM 0 DEGREE

## (undated) DEVICE — CLEANING SHEATH ENDO-SCRUB FOR STORZ 7210FA ARTHROSCOPE 4MM 45 DEGREE

## (undated) DEVICE — SUT CHROMIC GUT 4-0 18" P-13

## (undated) DEVICE — SYR LUER LOK 10CC

## (undated) DEVICE — WARMING BLANKET UPPER ADULT

## (undated) DEVICE — STORZ DURA MICRO KNIFE INSERT POINTED

## (undated) DEVICE — GLV 8.5 PROTEXIS (WHITE)

## (undated) DEVICE — MARKING PEN W RULER

## (undated) DEVICE — ELCTR BOVIE TIP NEEDLE INSULATED 2.8" EDGE

## (undated) DEVICE — TUBING SUCTION 20FT

## (undated) DEVICE — MIDAS REX MR8 CLEARVIEW TN BALL 4.5MM X 13CM COARSE

## (undated) DEVICE — SPHERE MARKER (5 SPHERES)

## (undated) DEVICE — SOL IRR POUR H2O 250ML

## (undated) DEVICE — DRAPE 3/4 SHEET W REINFORCEMENT 56X77"

## (undated) DEVICE — MIDAS REX MR8 CLEARVIEW TN BALL 4MM X 13CM

## (undated) DEVICE — VENODYNE/SCD SLEEVE CALF LARGE

## (undated) DEVICE — SUT CHROMIC 3-0 30" V-20

## (undated) DEVICE — DRSG TELFA 3 X 8

## (undated) DEVICE — PACK LUMBAR LAMI

## (undated) DEVICE — TUBING MR8 IRRIGATION CV LOW-PRFL

## (undated) DEVICE — GLV 7.5 PROTEXIS (WHITE)

## (undated) DEVICE — SUT VICRYL 3-0 18" X-1 (POP-OFF)

## (undated) DEVICE — STORZ DURA MICRO KNIFE INSERT SICKLE-SHAPED

## (undated) DEVICE — ELCTR BOVIE TIP BLADE INSULATED 2.75" EDGE

## (undated) DEVICE — STAPLER SKIN VISI-STAT 35 WIDE

## (undated) DEVICE — NDL HYPO REGULAR BEVEL 25G X 1.5" (BLUE)

## (undated) DEVICE — MEDICATION LABELS W MARKER

## (undated) DEVICE — POSITIONER FOAM HEADREST (PINK)

## (undated) DEVICE — ELCTR MONOPOLAR STIMULATOR PROBE FLUSH-TIP

## (undated) DEVICE — GOWN TRIMAX LG

## (undated) DEVICE — DRAPE MINOR PROCEDURE

## (undated) DEVICE — DRAPE TOWEL BLUE 17" X 24"

## (undated) DEVICE — SUCTION COAGULATOR HAND CONTROL 10FR X 6"

## (undated) DEVICE — NDL SPINAL 18G X 3.5" (PINK)

## (undated) DEVICE — VENODYNE/SCD SLEEVE CALF MEDIUM

## (undated) DEVICE — ENDO SCRUB

## (undated) DEVICE — SUT MONOCRYL 4-0 18" PS-2

## (undated) DEVICE — SPECIMEN CONTAINER 100ML

## (undated) DEVICE — GLV 7 PROTEXIS (WHITE)

## (undated) DEVICE — ELCTR PEDICLE SCREW PROBE 3MM BALL 1.8MM X 100MM

## (undated) DEVICE — ELCTR BIPOLAR PROBE